# Patient Record
Sex: FEMALE | Race: WHITE | NOT HISPANIC OR LATINO | Employment: OTHER | URBAN - METROPOLITAN AREA
[De-identification: names, ages, dates, MRNs, and addresses within clinical notes are randomized per-mention and may not be internally consistent; named-entity substitution may affect disease eponyms.]

---

## 2018-03-21 ENCOUNTER — OFFICE VISIT (OUTPATIENT)
Dept: FAMILY MEDICINE | Facility: CLINIC | Age: 28
End: 2018-03-21
Payer: COMMERCIAL

## 2018-03-21 VITALS
SYSTOLIC BLOOD PRESSURE: 126 MMHG | HEIGHT: 66 IN | BODY MASS INDEX: 27 KG/M2 | WEIGHT: 168 LBS | TEMPERATURE: 98.1 F | OXYGEN SATURATION: 100 % | HEART RATE: 95 BPM | DIASTOLIC BLOOD PRESSURE: 86 MMHG

## 2018-03-21 DIAGNOSIS — R20.0 NUMBNESS AND TINGLING OF FOOT: ICD-10-CM

## 2018-03-21 DIAGNOSIS — D69.6 THROMBOCYTOPENIA (H): ICD-10-CM

## 2018-03-21 DIAGNOSIS — Z13.220 SCREENING FOR HYPERLIPIDEMIA: ICD-10-CM

## 2018-03-21 DIAGNOSIS — F17.200 TOBACCO USE DISORDER: ICD-10-CM

## 2018-03-21 DIAGNOSIS — H93.13 TINNITUS, BILATERAL: ICD-10-CM

## 2018-03-21 DIAGNOSIS — Z00.00 ROUTINE HISTORY AND PHYSICAL EXAMINATION OF ADULT: Primary | ICD-10-CM

## 2018-03-21 DIAGNOSIS — H93.8X3 PLUGGED FEELING IN EAR, BILATERAL: ICD-10-CM

## 2018-03-21 DIAGNOSIS — R20.2 NUMBNESS AND TINGLING OF FOOT: ICD-10-CM

## 2018-03-21 DIAGNOSIS — Z13.1 SCREENING FOR DIABETES MELLITUS: ICD-10-CM

## 2018-03-21 DIAGNOSIS — R74.8 ELEVATED LIVER ENZYMES: ICD-10-CM

## 2018-03-21 LAB
ERYTHROCYTE [DISTWIDTH] IN BLOOD BY AUTOMATED COUNT: 14.9 % (ref 10–15)
HCT VFR BLD AUTO: 33.5 % (ref 35–47)
HGB BLD-MCNC: 11.6 G/DL (ref 11.7–15.7)
MCH RBC QN AUTO: 38.4 PG (ref 26.5–33)
MCHC RBC AUTO-ENTMCNC: 34.6 G/DL (ref 31.5–36.5)
MCV RBC AUTO: 111 FL (ref 78–100)
PLATELET # BLD AUTO: 92 10E9/L (ref 150–450)
RBC # BLD AUTO: 3.02 10E12/L (ref 3.8–5.2)
VIT B12 SERPL-MCNC: 958 PG/ML (ref 193–986)
WBC # BLD AUTO: 5.5 10E9/L (ref 4–11)

## 2018-03-21 PROCEDURE — 99385 PREV VISIT NEW AGE 18-39: CPT | Performed by: NURSE PRACTITIONER

## 2018-03-21 PROCEDURE — 80061 LIPID PANEL: CPT | Performed by: NURSE PRACTITIONER

## 2018-03-21 PROCEDURE — 84443 ASSAY THYROID STIM HORMONE: CPT | Performed by: NURSE PRACTITIONER

## 2018-03-21 PROCEDURE — 36415 COLL VENOUS BLD VENIPUNCTURE: CPT | Performed by: NURSE PRACTITIONER

## 2018-03-21 PROCEDURE — 80053 COMPREHEN METABOLIC PANEL: CPT | Performed by: NURSE PRACTITIONER

## 2018-03-21 PROCEDURE — 99213 OFFICE O/P EST LOW 20 MIN: CPT | Mod: 25 | Performed by: NURSE PRACTITIONER

## 2018-03-21 PROCEDURE — 85027 COMPLETE CBC AUTOMATED: CPT | Performed by: NURSE PRACTITIONER

## 2018-03-21 PROCEDURE — 82607 VITAMIN B-12: CPT | Performed by: NURSE PRACTITIONER

## 2018-03-21 ASSESSMENT — PAIN SCALES - GENERAL: PAINLEVEL: NO PAIN (0)

## 2018-03-21 NOTE — MR AVS SNAPSHOT
After Visit Summary   3/21/2018    Karla Gordon    MRN: [id number]           Patient Information     Date Of Birth          1990        Visit Information        Provider Department      3/21/2018 8:40 AM Millie Moore NP [facility]        Today's Diagnoses     Routine history and physical examination of adult    -  1    Numbness and tingling of foot        Plugged feeling in ear, bilateral        Tinnitus, bilateral        Screening for diabetes mellitus        Screening for hyperlipidemia          Care Instructions    Try using Neti Pot every day    Flonase nasal spray daily    Do this daily    Preventive Health Recommendations  Female Ages 26 - 39  Yearly exam:   See your health care provider every year in order to    Review health changes.     Discuss preventive care.      Review your medicines if you your doctor has prescribed any.    Until age 30: Get a Pap test every three years (more often if you have had an abnormal result).    After age 30: Talk to your doctor about whether you should have a Pap test every 3 years or have a Pap test with HPV screening every 5 years.   You do not need a Pap test if your uterus was removed (hysterectomy) and you have not had cancer.  You should be tested each year for STDs (sexually transmitted diseases), if you're at risk.   Talk to your provider about how often to have your cholesterol checked.  If you are at risk for diabetes, you should have a diabetes test (fasting glucose).  Shots: Get a flu shot each year. Get a tetanus shot every 10 years.   Nutrition:     Eat at least 5 servings of fruits and vegetables each day.    Eat whole-grain bread, whole-wheat pasta and brown rice instead of white grains and rice.    Talk to your provider about Calcium and Vitamin D.     Lifestyle    Exercise at least 150 minutes a week (30 minutes a day, 5 days of the week). This will help you control your weight and prevent  disease.    Limit alcohol to one drink per day.    No smoking.     Wear sunscreen to prevent skin cancer.    See your dentist every six months for an exam and cleaning.  [facility]   Discharged by : Gale Tapia CMA  Paper scripts provided to patient : no     If you have any questions regarding your visit please contact your care team:     Team Gold                Clinic Hours Telephone Number     Dr. Bonnie Moore NP 7am-7pm  Monday - Thursday   7am-5pm  Fridays  [phone]   (Appointment scheduling available 24/7)     RN Line  [phone] option 2     Urgent Care - [place] - 11am-9pm Monday-Friday Saturday-Sunday- 9am-5pm     [place] -   5pm-9pm Monday-Friday Saturday-Sunday- 9am-5pm    [phone] - [place]    [phone] - [place]       For a Price Quote for your services, please call our Consumer Price Line at [phone].     What options do I have for visits at the clinic other than the traditional office visit?     To expand how we care for you, many of our providers are utilizing electronic visits (e-visits) and telephone visits, when medically appropriate, for interactions with their patients rather than a visit in the clinic. We also offer nurse visits for many medical concerns. Just like any other service, we will bill your insurance company for this type of visit based on time spent on the phone with your provider. Not all insurance companies cover these visits. Please check with your medical insurance if this type of visit is covered. You will be responsible for any charges that are not paid by your insurance.   E-visits via DeYapa: generally incur a $35.00 fee.     Telephone visits:   Time spent on the phone: *charged based on time that is spent on the phone in increments of 10 minutes. Estimated cost:   5-10 mins $30.00   11-20 mins. $59.00    21-30 mins. $85.00     Use Spreetales (secure email communication and access to your chart) to send your primary care provider a message or make an appointment. Ask someone on your Team how to sign up for Spreetales.     As always, Thank you for trusting us with your health care needs!      [facility]:    Scheduling Appointments  [facility]  Call: [phone]    [facility]  Call: [phone]    [facility]  Call: [phone]    For Gastroenterology referrals   [facility]   Clinics and Surgery Center, 4th Floor   [address] [place] [address]   Appointments: [phone]    WHERE TO GO FOR CARE?  Clinic    Make an appointment if you:       Are sick (cold, cough, flu, sore throat, earache or in pain).       Have a small injury (sprain, small cut, burn or broken bone).       Need a physical exam, Pap smear, vaccine or prescription refill.       Have questions about your health or medicines.    To reach us:      Call [phone] ([phone]). Open 24 hours every day. (For counseling services, call [phone].)    Log into Spreetales at [email or web address]. (Visit [email or web address] to create an account.) Hospital emergency room    An emergency is a serious or life- threatening problem that must be treated right away.    Call 082 or get to the hospital if you have:      Very bad or sudden:            - Chest pain or pressure         - Bleeding         - Head or belly pain         - Dizziness or trouble seeing, walking or                          Speaking      Problems breathing      Blood in your vomit or you are coughing up blood      A major injury (knocked out, loss of a finger or limb, rape, broken bone protruding from skin)    A mental health crisis. (Or call the Mental Health Crisis line at 1-175.204.8487 or Suicide Prevention Hotline at 1-109.123.2759.)    Open 24 hours every day. You  don't need an appointment.     Urgent care    Visit urgent care for sickness or small injuries when the clinic is closed. You don't need an appointment. To check hours or find an urgent care near you, visit [email or web address]. Online care    Get online [facility] from [facility] for more than 70 common problems, like colds, allergies and infections. Open 24 hours every day at:   [email or web address]   Need help deciding?    For advice about where to be seen, you may call your clinic and ask to speak with a nurse. We're here for you 24 hours every day.         If you are deaf or hard of hearing, please let us know. We provide many free services including sign language interpreters, oral interpreters, TTYs, telephone amplifiers, note takers and written materials.                   Follow-ups after your visit        Additional Services     OTOLARYNGOLOGY REFERRAL       Your provider has referred you to: [place]: [place] [facility] M [phone]   [email or web address]/[place]/    Please be aware that coverage of these services is subject to the terms and limitations of your health insurance plan.  Call member services at your health plan with any benefit or coverage questions.      Please bring the following with you to your appointment:    (1) Any X-Rays, CTs or MRIs which have been performed.  Contact the facility where they were done to arrange for  prior to your scheduled appointment.   (2) List of current medications  (3) This referral request   (4) Any documents/labs given to you for this referral                  Follow-up notes from your care team     Return in about 4 weeks (around 4/18/2018) for Pap and review results.      Who to contact     If you have questions or need follow up information about today's clinic visit or your schedule please contact [facility] directly at [phone].  Normal or non-critical lab and imaging results will be communicated to you by Gato  "letter or phone within 4 business days after the clinic has received the results. If you do not hear from us within 7 days, please contact the clinic through Squla or phone. If you have a critical or abnormal lab result, we will notify you by phone as soon as possible.  Submit refill requests through Squla or call your pharmacy and they will forward the refill request to us. Please allow 3 business days for your refill to be completed.          Additional Information About Your Visit        Squla Information     Squla lets you send messages to your doctor, view your test results, renew your prescriptions, schedule appointments and more. To sign up, go to [email or web address] . Click on \"Log in\" on the left side of the screen, which will take you to the Welcome page. Then click on \"Sign up Now\" on the right side of the page.     You will be asked to enter the access code listed below, as well as some personal information. Please follow the directions to create your username and password.     Your access code is: [email or web address]  Expires: 2018  9:38 AM     Your access code will  in 90 days. If you need help or a new code, please call your [place] clinic or [phone].        Care EveryWhere ID     This is your Care EveryWhere ID. This could be used by other organizations to access your [place] medical records  [id number]        Your Vitals Were     Pulse Temperature Height Last Period Pulse Oximetry BMI (Body Mass Index)    95 98.1  F (36.7  C) (Oral) 5' 6.2\" (1.681 m) 2018 100% 26.95 kg/m2       Blood Pressure from Last 3 Encounters:   18 126/86    Weight from Last 3 Encounters:   18 168 lb (76.2 kg)              We Performed the Following     CBC with platelets     Comprehensive metabolic panel (BMP + Alb, Alk Phos, ALT, AST, Total. Bili, TP)     Lipid Profile (Chol, Trig, HDL, LDL calc)     OTOLARYNGOLOGY REFERRAL     TSH with free T4 reflex     Vitamin B12        Primary " Care Provider Office Phone # Fax #    [facility] [phone]       [address] [facility] [address]        Equal Access to Services     GISELLE KRAUSE : Hadii aad ku hadjennio Soantoineali, waaxda luqadaha, qaybta kaalmada adekendrayada, antony hutchinsonkarthikeyan baldwinkendra kayfei lacy. So [facility] [phone].    ATENCIÓN: Si habla español, tiene a hobbs disposición servicios gratuitos de asistencia lingüística. Llame al [phone].    We comply with applicable federal civil rights laws and [place] laws. We do not discriminate on the basis of race, color, national origin, age, disability, sex, sexual orientation, or gender identity.            Thank you!     Thank you for choosing [facility]  for your care. Our goal is always to provide you with excellent care. Hearing back from our patients is one way we can continue to improve our services. Please take a few minutes to complete the written survey that you may receive in the mail after your visit with us. Thank you!             Your Updated Medication List - Protect others around you: Learn how to safely use, store and throw away your medicines at www.disposemymeds.org.          This list is accurate as of 3/21/18  9:38 AM.  Always use your most recent med list.                   Brand Name Dispense Instructions for use Diagnosis    diphenhydrAMINE-acetaminophen  MG tablet    TYLENOL PM     Take 2 tablets by mouth nightly as needed for sleep

## 2018-03-21 NOTE — PROGRESS NOTES
"   SUBJECTIVE:   CC: Karla Gordon is an 27 year old woman who presents for preventive health visit.     She is here mainly for her ear concern and feet concern.    Physical   Annual:     Getting at least 3 servings of Calcium per day::  Yes    Bi-annual eye exam::  Yes    Dental care twice a year::  NO    Sleep apnea or symptoms of sleep apnea::  Daytime drowsiness    Taking medications regularly::  Yes    Medication side effects::  Not applicable    Additional concerns today::  YES          Ear concern:  Asks for ENT referral.  Here mainly for her ears.  Relocated to [place] from [facility] in September 2017.  September or October her ears started getting plugged up.  Also gets vertigo then feels off balance, she even ran into a counter the other day and bruised her left hip.  Quick motions make vertigo worse.  No nasal congestion.  Near falls due to off balance with vertigo.  Got her hearing tested by audiology 3 weeks ago and was told it was normal.  Tried ear drops (\"ear allergy\"), home remedy treatment (hydrogen peroxide, garlic and olive oil) that all did not help.  Not tried nasal sprays.    Tinnitus:  Bilateral, daily.  Will sometimes go hours without it.    No headaches.  Was anemic during pregnancy.    Went to the eye doctor within the past week.  Left eye hemorrhage which she got referred to another eye clinic, right eye blurred vision.  Wears glasses.    Will be establishing care with a dentist soon.    Numb Feet:  Constant numbness in feet for the past 1 month.  Also will get some tingling.  This started suddenly 1 month ago.  Location:  Top and bottom of right foot up to the ankle.  Left foot toes and ball of foot.  Went to get massage 2 months ago who said her iliotibial bands were problematic but could not give her any explanation for the numbness in feet.  No back pain.    Sleeps on couch because her  snores.    Has a 2 year old boy.  Last Pap was in pregnancy 2016.  Patient " declines Pap today due to menstruation, she signed TRINY to get last Pap record.    PSH:  Right partial shoulder replacement age 25.  [place] teeth extraction 2015.    Tobacco use:  1/2 pack per day, for 7 years.    *Both ears plugged for 6 mo, and both feet numb for 1 mo.      Today's PHQ-2 Score:   PHQ-2 ( 1999 Pfizer) 3/21/2018   Q1: Little interest or pleasure in doing things 0   Q2: Feeling down, depressed or hopeless 0   PHQ-2 Score 0   Q1: Little interest or pleasure in doing things Not at all   Q2: Feeling down, depressed or hopeless Not at all   PHQ-2 Score 0       Abuse: Current or Past(Physical, Sexual or Emotional)- NO  Do you feel safe in your environment - YES    Social History   Substance Use Topics     Smoking status: Current Every Day Smoker     Packs/day: 0.50     Years: 7.00     Types: Cigarettes     Smokeless tobacco: Current User     Alcohol use Yes     Alcohol Use 3/21/2018   If you drink alcohol do you typically have greater than 3 drinks per day OR greater than 7 drinks per week? Yes   AUDIT SCORE  4     AUDIT - Alcohol Use Disorders Identification Test - Reproduced from the World Health Organization Audit 2001 (Second Edition) 3/21/2018   1.  How often do you have a drink containing alcohol? 4 or more times a week   2.  How many drinks containing alcohol do you have on a typical day when you are drinking? 1 or 2   3.  How often do you have five or more drinks on one occasion? Never   4.  How often during the last year have you found that you were not able to stop drinking once you had started? Never   5.  How often during the last year have you failed to do what was normally expected of you because of drinking? Never   6.  How often during the last year have you needed a first drink in the morning to get yourself going after a heavy drinking session? Never   7.  How often during the last year have you had a feeling of guilt or remorse after drinking? Never   8.  How often during the last year  have you been unable to remember what happened the night before because of your drinking? Never   9.  Have you or someone else been injured because of your drinking? No   10. Has a relative, friend, doctor or other health care worker been concerned about your drinking or suggested you cut down? No   TOTAL SCORE 4       Reviewed orders with patient.  Reviewed health maintenance and updated orders accordingly - Yes  Patient Active Problem List   Diagnosis     Numbness and tingling of foot     Tinnitus, bilateral     Plugged feeling in ear, bilateral     Tobacco use disorder     Past Surgical History:   Procedure Laterality Date     C ANESTH,SHOULDER REPLACEMENT Right 2015    right partial shoulder replacement at age 25     HC TOOTH EXTRACTION W/FORCEP  2015       Social History   Substance Use Topics     Smoking status: Current Every Day Smoker     Packs/day: 0.50     Years: 7.00     Types: Cigarettes     Smokeless tobacco: Current User     Alcohol use Yes     Family History   Problem Relation Age of Onset     CANCER Mother 62     bladder cancer     Hypertension Mother      Psoriasis Father      No Known Problems Brother      No Known Problems Sister      No Known Problems Son      DIABETES No family hx of      Hyperlipidemia No family hx of      Breast Cancer No family hx of      Colon Cancer No family hx of          Current Outpatient Prescriptions   Medication Sig Dispense Refill     diphenhydrAMINE-acetaminophen (TYLENOL PM)  MG tablet Take 2 tablets by mouth nightly as needed for sleep       Allergies   Allergen Reactions     Spinach      Mouth gets numb       Mammogram not appropriate for this patient based on age.    Pertinent mammograms are reviewed under the imaging tab.  History of abnormal Pap smear: NO - age 21-29 PAP every 3 years recommended    Reviewed and updated as needed this visit by clinical staff  Allergies  Meds  Problems         Reviewed and updated as needed this visit by  "Provider  Allergies  Meds  Problems            Review of Systems  C: NEGATIVE for fever, chills, change in weight  I: NEGATIVE for worrisome rashes, moles or lesions  EYES: POSITIVE for vision change right eye, just saw eye doctor  ENT: see HPI  R: NEGATIVE for significant cough or SOB  B: NEGATIVE for masses, tenderness or discharge  CV: NEGATIVE for chest pain, palpitations or peripheral edema  GI: NEGATIVE for nausea, abdominal pain, heartburn, or change in bowel habits  : NEGATIVE for unusual urinary or vaginal symptoms. Periods are regular.  M: NEGATIVE for significant arthralgias or myalgia  NEURO: POSITIVE for numbness or tingling feet and vertigo  P: NEGATIVE for changes in mood or affect     OBJECTIVE:   /86 (BP Location: Right arm, Patient Position: Chair, Cuff Size: Adult Regular)  Pulse 95  Temp 98.1  F (36.7  C) (Oral)  Ht 5' 6.2\" (1.681 m)  Wt 168 lb (76.2 kg)  LMP 03/17/2018  SpO2 100%  BMI 26.95 kg/m2  Physical Exam  GENERAL: healthy, alert and no distress  EYES: Eyes grossly normal to inspection, PERRL and conjunctivae and sclerae normal  HENT: ear canals and TM's normal, nose and mouth without ulcers or lesions  NECK: no adenopathy, no asymmetry, masses, or scars and thyroid normal to palpation  RESP: lungs clear to auscultation - no rales, rhonchi or wheezes  BREAST: normal without masses, tenderness or nipple discharge and no palpable axillary masses or adenopathy  CV: regular rate and rhythm, normal S1 S2, no S3 or S4, no murmur, click or rub, no peripheral edema and peripheral pulses strong  ABDOMEN: soft, nontender, no hepatosplenomegaly, no masses and bowel sounds normal  MS: no gross musculoskeletal defects noted, no edema  SKIN: no suspicious lesions or rashes  NEURO: Normal strength and tone, mentation intact and speech normal  PSYCH: mentation appears normal, affect normal/bright  Feet:  Monofilament abnormal with decreased sensation in feet    ASSESSMENT/PLAN:   1. " "Routine history and physical examination of adult  - Patient declined Pap and signed TRINY to get last pap record.    2. Numbness and tingling of feet x1 month  Will start with blood work-up  - CBC with platelets  - Comprehensive metabolic panel (BMP + Alb, Alk Phos, ALT, AST, Total. Bili, TP)  - TSH with free T4 reflex  - Vitamin B12    3. Plugged feeling in ear, bilateral    - OTOLARYNGOLOGY REFERRAL    4. Tinnitus, bilateral    - OTOLARYNGOLOGY REFERRAL    5. Screening for diabetes mellitus    6. Screening for hyperlipidemia    - Lipid Profile (Chol, Trig, HDL, LDL calc)    7. Tobacco use disorder      COUNSELING:  Reviewed preventive health counseling, as reflected in patient instructions       Regular exercise       Healthy diet/nutrition       Vision screening       Hearing screening         reports that she has been smoking Cigarettes.  She has a 3.50 pack-year smoking history. She uses smokeless tobacco.  Tobacco Cessation Action Plan: Information offered: Patient not interested at this time  Estimated body mass index is 26.95 kg/(m^2) as calculated from the following:    Height as of this encounter: 5' 6.2\" (1.681 m).    Weight as of this encounter: 168 lb (76.2 kg).   Weight management plan: Discussed healthy diet and exercise guidelines and patient will follow up in 12 months in clinic to re-evaluate.    Counseling Resources:  ATP IV Guidelines  Pooled Cohorts Equation Calculator  Breast Cancer Risk Calculator  FRAX Risk Assessment  ICSI Preventive Guidelines  Dietary Guidelines for Americans, 2010  USDA's MyPlate  ASA Prophylaxis  Lung CA Screening    Millie Moore, NP  [facility]  Answers for HPI/ROS submitted by the patient on 3/21/2018   PHQ-2 Score: 0  E  "

## 2018-03-21 NOTE — PATIENT INSTRUCTIONS
Try using Neti Pot every day    Flonase nasal spray daily    Do this daily    Preventive Health Recommendations  Female Ages 26 - 39  Yearly exam:   See your health care provider every year in order to    Review health changes.     Discuss preventive care.      Review your medicines if you your doctor has prescribed any.    Until age 30: Get a Pap test every three years (more often if you have had an abnormal result).    After age 30: Talk to your doctor about whether you should have a Pap test every 3 years or have a Pap test with HPV screening every 5 years.   You do not need a Pap test if your uterus was removed (hysterectomy) and you have not had cancer.  You should be tested each year for STDs (sexually transmitted diseases), if you're at risk.   Talk to your provider about how often to have your cholesterol checked.  If you are at risk for diabetes, you should have a diabetes test (fasting glucose).  Shots: Get a flu shot each year. Get a tetanus shot every 10 years.   Nutrition:     Eat at least 5 servings of fruits and vegetables each day.    Eat whole-grain bread, whole-wheat pasta and brown rice instead of white grains and rice.    Talk to your provider about Calcium and Vitamin D.     Lifestyle    Exercise at least 150 minutes a week (30 minutes a day, 5 days of the week). This will help you control your weight and prevent disease.    Limit alcohol to one drink per day.    No smoking.     Wear sunscreen to prevent skin cancer.    See your dentist every six months for an exam and cleaning.  [employer]   Discharged by : Gale Tapia CMA  Paper scripts provided to patient : no     If you have any questions regarding your visit please contact your care team:     Team Gold                Clinic Hours Telephone Number     Dr. Bonnie Moore, NP 7am-7pm  Monday - Thursday   7am-5pm  Fridays  [phone]    (Appointment scheduling available 24/7)     RN Line  [phone] option 2     Urgent Care - [place] and [place] - 11am-9pm Monday-Friday Saturday-Sunday- 9am-5pm     [place] -   5pm-9pm Monday-Friday Saturday-Sunday- 9am-5pm    [phone] - [place]    [phone] - [place]       For a Price Quote for your services, please call our Consumer Price Line at [phone].     What options do I have for visits at the clinic other than the traditional office visit?     To expand how we care for you, many of our providers are utilizing electronic visits (e-visits) and telephone visits, when medically appropriate, for interactions with their patients rather than a visit in the clinic. We also offer nurse visits for many medical concerns. Just like any other service, we will bill your insurance company for this type of visit based on time spent on the phone with your provider. Not all insurance companies cover these visits. Please check with your medical insurance if this type of visit is covered. You will be responsible for any charges that are not paid by your insurance.   E-visits via Sara Campbellhart: generally incur a $35.00 fee.     Telephone visits:   Time spent on the phone: *charged based on time that is spent on the phone in increments of 10 minutes. Estimated cost:   5-10 mins $30.00   11-20 mins. $59.00   21-30 mins. $85.00     Use Sara Campbellhart (secure email communication and access to your chart) to send your primary care provider a message or make an appointment. Ask someone on your Team how to sign up for [employer].     As always, Thank you for trusting us with your health care needs!      [facility]:    Scheduling Appointments  [place] [place]  Call: [phone]    [facility]  Call: [phone]    [facility]  Call: [phone]    For Gastroenterology referrals   [facility] Gastroenterology    [facility], 4th Floor   [address] [place] [address]   Appointments: [phone]    WHERE TO GO FOR CARE?  Clinic    Make an appointment if you:       Are sick (cold, cough, flu, sore throat, earache or in pain).       Have a small injury (sprain, small cut, burn or broken bone).       Need a physical exam, Pap smear, vaccine or prescription refill.       Have questions about your health or medicines.    To reach us:      Call [phone] ([phone]). Open 24 hours every day. (For counseling services, call [phone].)    Log into "[employer] at [employer]. (Visit [employer] to create an account.) Hospital emergency room    An emergency is a serious or life- threatening problem that must be treated right away.    Call [phone] or get to the hospital if you have:      Very bad or sudden:            - Chest pain or pressure         - Bleeding         - Head or belly pain         - Dizziness or trouble seeing, walking or                          Speaking      Problems breathing      Blood in your vomit or you are coughing up blood      A major injury (knocked out, loss of a finger or limb, rape, broken bone protruding from skin)    A mental health crisis. (Or call the Mental Health Crisis line at 1-979.623.8903 or Suicide Prevention Hotline at 1-585.893.8312.)    Open 24 hours every day. You don't need an appointment.     Urgent care    Visit urgent care for sickness or small injuries when the clinic is closed. You don't need an appointment. To check hours or find an urgent care near you, visit [email or web address]. Online care    Get online care from [facility] for more than 70 common problems, like colds, allergies and infections. Open 24 hours every day at:   [email or web address]   Need help deciding?    For advice about where to be seen, you may call your clinic and ask to speak with a nurse. We're here for you 24 hours every day.         If you are deaf or hard of hearing,  please let us know. We provide many free services including sign language interpreters, oral interpreters, TTYs, telephone amplifiers, note takers and written materials.

## 2018-03-21 NOTE — NURSING NOTE
"Chief Complaint   Patient presents with     Physical     Ear Problem     both are plugged - times 6mo     Musculoskeletal Problem     both feet are numb - times 1 mo        Initial /86 (BP Location: Right arm, Patient Position: Chair, Cuff Size: Adult Regular)  Pulse 95  Temp 98.1  F (36.7  C) (Oral)  Ht 5' 6.2\" (1.681 m)  Wt 168 lb (76.2 kg)  LMP 03/17/2018  SpO2 100%  BMI 26.95 kg/m2 Estimated body mass index is 26.95 kg/(m^2) as calculated from the following:    Height as of this encounter: 5' 6.2\" (1.681 m).    Weight as of this encounter: 168 lb (76.2 kg).  Medication Reconciliation: complete   Gale Tapia CMA      "

## 2018-03-22 PROBLEM — R74.8 ELEVATED LIVER ENZYMES: Status: ACTIVE | Noted: 2018-03-22

## 2018-03-22 LAB
ALBUMIN SERPL-MCNC: 3.6 G/DL (ref 3.4–5)
ALP SERPL-CCNC: 160 U/L (ref 40–150)
ALT SERPL W P-5'-P-CCNC: 76 U/L (ref 0–50)
ANION GAP SERPL CALCULATED.3IONS-SCNC: 9 MMOL/L (ref 3–14)
AST SERPL W P-5'-P-CCNC: 236 U/L (ref 0–45)
BILIRUB SERPL-MCNC: 1.1 MG/DL (ref 0.2–1.3)
BUN SERPL-MCNC: 7 MG/DL (ref 7–30)
CALCIUM SERPL-MCNC: 8.2 MG/DL (ref 8.5–10.1)
CHLORIDE SERPL-SCNC: 107 MMOL/L (ref 94–109)
CHOLEST SERPL-MCNC: 211 MG/DL
CO2 SERPL-SCNC: 26 MMOL/L (ref 20–32)
CREAT SERPL-MCNC: 0.53 MG/DL (ref 0.52–1.04)
GFR SERPL CREATININE-BSD FRML MDRD: >90 ML/MIN/1.7M2
GLUCOSE SERPL-MCNC: 92 MG/DL (ref 70–99)
HDLC SERPL-MCNC: 94 MG/DL
LDLC SERPL CALC-MCNC: 76 MG/DL
NONHDLC SERPL-MCNC: 117 MG/DL
POTASSIUM SERPL-SCNC: 4.6 MMOL/L (ref 3.4–5.3)
PROT SERPL-MCNC: 6.9 G/DL (ref 6.8–8.8)
SODIUM SERPL-SCNC: 142 MMOL/L (ref 133–144)
TRIGL SERPL-MCNC: 206 MG/DL
TSH SERPL DL<=0.005 MIU/L-ACNC: 1.51 MU/L (ref 0.4–4)

## 2018-03-23 ENCOUNTER — TELEPHONE (OUTPATIENT)
Dept: FAMILY MEDICINE | Facility: CLINIC | Age: 28
End: 2018-03-23

## 2018-03-23 NOTE — PROGRESS NOTES
Please call Karla to let her know about results and plan:    - Vitamin B12 normal  - Fasting blood sugar normal  - Kidney function normal  - Thyroid level normal  - Cholesterol levels show high triglycerides, no medication needed at this time.    - Liver tests are elevated and I recommend rechecking this in 1-2 weeks and screening for hepatitis B and C.    Also recommend she avoids alcohol and Tylenol use now until we figure out what is causing her liver tests to be elevated.  - Blood counts show that her platelet level is low.  I recommend rechecking this in 1-2 weeks.    She should schedule a lab appointment in 1-2 weeks.  Schedule a follow up with me for recheck and lab review after that.

## 2018-03-23 NOTE — TELEPHONE ENCOUNTER
Please call Karla to let her know about results and plan:     - Vitamin B12 normal   - Fasting blood sugar normal   - Kidney function normal   - Thyroid level normal   - Cholesterol levels show high triglycerides, no medication needed at this time.     - Liver tests are elevated and I recommend rechecking this in 1-2 weeks and screening for hepatitis B and C.     Also recommend she avoids alcohol and Tylenol use now until we figure out what is causing her liver tests to be elevated.   - Blood counts show that her platelet level is low.  I recommend rechecking this in 1-2 weeks.     She should schedule a lab appointment in 1-2 weeks.   Schedule a follow up with me for recheck and lab review after that.     Millie Moore NP

## 2018-03-23 NOTE — TELEPHONE ENCOUNTER
Relayed message, she will walk into the lab sometime next week & I scheduled her for 4/4.  Sara Coombs RN

## 2018-04-02 DIAGNOSIS — D53.9 MACROCYTIC ANEMIA: Primary | ICD-10-CM

## 2018-04-02 DIAGNOSIS — D69.6 THROMBOCYTOPENIA (H): ICD-10-CM

## 2018-04-02 DIAGNOSIS — R74.8 ELEVATED LIVER ENZYMES: ICD-10-CM

## 2018-04-02 LAB
BASOPHILS # BLD AUTO: 0 10E9/L (ref 0–0.2)
BASOPHILS NFR BLD AUTO: 0.4 %
DIFFERENTIAL METHOD BLD: ABNORMAL
EOSINOPHIL # BLD AUTO: 0 10E9/L (ref 0–0.7)
EOSINOPHIL NFR BLD AUTO: 0.8 %
ERYTHROCYTE [DISTWIDTH] IN BLOOD BY AUTOMATED COUNT: 16 % (ref 10–15)
HCT VFR BLD AUTO: 32.7 % (ref 35–47)
HGB BLD-MCNC: 11.3 G/DL (ref 11.7–15.7)
LYMPHOCYTES # BLD AUTO: 1.2 10E9/L (ref 0.8–5.3)
LYMPHOCYTES NFR BLD AUTO: 24 %
MCH RBC QN AUTO: 38.8 PG (ref 26.5–33)
MCHC RBC AUTO-ENTMCNC: 34.6 G/DL (ref 31.5–36.5)
MCV RBC AUTO: 112 FL (ref 78–100)
MONOCYTES # BLD AUTO: 0.5 10E9/L (ref 0–1.3)
MONOCYTES NFR BLD AUTO: 9.4 %
NEUTROPHILS # BLD AUTO: 3.2 10E9/L (ref 1.6–8.3)
NEUTROPHILS NFR BLD AUTO: 65.4 %
PLATELET # BLD AUTO: 92 10E9/L (ref 150–450)
RBC # BLD AUTO: 2.91 10E12/L (ref 3.8–5.2)
WBC # BLD AUTO: 4.9 10E9/L (ref 4–11)

## 2018-04-02 PROCEDURE — 85060 BLOOD SMEAR INTERPRETATION: CPT | Performed by: NURSE PRACTITIONER

## 2018-04-02 PROCEDURE — G0499 HEPB SCREEN HIGH RISK INDIV: HCPCS | Performed by: NURSE PRACTITIONER

## 2018-04-02 PROCEDURE — 86803 HEPATITIS C AB TEST: CPT | Performed by: NURSE PRACTITIONER

## 2018-04-02 PROCEDURE — 36415 COLL VENOUS BLD VENIPUNCTURE: CPT | Performed by: NURSE PRACTITIONER

## 2018-04-02 PROCEDURE — 85025 COMPLETE CBC W/AUTO DIFF WBC: CPT | Performed by: NURSE PRACTITIONER

## 2018-04-03 ENCOUNTER — DOCUMENTATION ONLY (OUTPATIENT)
Dept: LAB | Facility: CLINIC | Age: 28
End: 2018-04-03

## 2018-04-03 DIAGNOSIS — D53.9 MACROCYTIC ANEMIA: Primary | ICD-10-CM

## 2018-04-03 LAB
COPATH REPORT: NORMAL
HBV SURFACE AG SERPL QL IA: NONREACTIVE
HCV AB SERPL QL IA: NONREACTIVE

## 2018-04-03 NOTE — PROGRESS NOTES
Will discuss result with patient at her upcoming appointment on 4/4/18.  Folate added on to work-up macrocytic anemia.    Note to self:  Patient with elevated liver enzymes, thrombocytopenia, macrocytic anemia  Differentials for Macrocytic anemia:  Excessive alcohol, liver disease, folate deficiency.  (TSH and B12 is normal).

## 2018-04-03 NOTE — PROGRESS NOTES
We are unable to add the folate to the previous orders, because folate requires to be frozen, the stability is only 8 hours if its not frozen right a way.     Tessy GODINEZ

## 2018-04-04 ENCOUNTER — OFFICE VISIT (OUTPATIENT)
Dept: FAMILY MEDICINE | Facility: CLINIC | Age: 28
End: 2018-04-04
Payer: COMMERCIAL

## 2018-04-04 VITALS
HEART RATE: 68 BPM | HEIGHT: 66 IN | BODY MASS INDEX: 27 KG/M2 | DIASTOLIC BLOOD PRESSURE: 70 MMHG | TEMPERATURE: 98.2 F | SYSTOLIC BLOOD PRESSURE: 112 MMHG | WEIGHT: 168 LBS

## 2018-04-04 DIAGNOSIS — R20.2 NUMBNESS AND TINGLING OF FOOT: ICD-10-CM

## 2018-04-04 DIAGNOSIS — F10.90 HEAVY ALCOHOL USE: Primary | ICD-10-CM

## 2018-04-04 DIAGNOSIS — R74.8 ELEVATED LIVER ENZYMES: ICD-10-CM

## 2018-04-04 DIAGNOSIS — D69.6 THROMBOCYTOPENIA (H): ICD-10-CM

## 2018-04-04 DIAGNOSIS — R20.0 NUMBNESS AND TINGLING OF FOOT: ICD-10-CM

## 2018-04-04 PROCEDURE — 99214 OFFICE O/P EST MOD 30 MIN: CPT | Performed by: NURSE PRACTITIONER

## 2018-04-04 NOTE — LETTER
[facility]  [address] [address]  [phone]      July 2, 2018      Karla Gordon  [address]  [place] [address]          Dear Karla Gordon:    This is to remind you that your provider wanted you to return to the clinic for lab testing.    If you are coming in for Lipids and/or Glucose testing please fast for 10-12 hours. Morning medications can be taken with water.    You may call our office at [phone] to schedule an appointment.    Please disregard this notice if you have already had your labs drawn or made an            appointment.        Sincerely,    [facility]

## 2018-04-04 NOTE — PATIENT INSTRUCTIONS
- Stop alcohol for now.  - Recheck blood in 6 weeks    - if the numbness in the feet does not get better then our plan will be neurologist consult  - if liver enzymes remain high then we will do the Liver Ultrasound and possible see a liver doctor  - if the platelets are still low we will send you to a blood doctor    Liver enzymes are high, platelets are low.  Both of these could be from drinking      Go to National [place] of Health website to review alcohol amount <7 drinks per week for women (1 or less per day)    [facility]   Discharged by : toan lobo NP  Paper scripts provided to patient : none     If you have any questions regarding your visit please contact your care team:     Team Gold                Clinic Hours Telephone Number     Dr. Bonnie Lobo NP 7am-7pm  Monday - Thursday   7am-5pm  Fridays  [phone]   (Appointment scheduling available 24/7)     RN Line  [phone] option 2     Urgent Care - [place] and [place] - 11am-9pm Monday-Friday Saturday-Sunday- 9am-5pm     [place] -   5pm-9pm Monday-Friday Saturday-Sunday- 9am-5pm    [phone] - [place]    [phone] - [place]       For a Price Quote for your services, please call our Consumer Price Line at [phone].     What options do I have for visits at the clinic other than the traditional office visit?     To expand how we care for you, many of our providers are utilizing electronic visits (e-visits) and telephone visits, when medically appropriate, for interactions with their patients rather than a visit in the clinic. We also offer nurse visits for many medical concerns. Just like any other service, we will bill your insurance company for this type of visit based on time spent on the phone with your provider. Not all insurance companies cover these visits. Please check with your medical  insurance if this type of visit is covered. You will be responsible for any charges that are not paid by your insurance.   E-visits via [employer]: generally incur a $35.00 fee.     Telephone visits:   Time spent on the phone: *charged based on time that is spent on the phone in increments of 10 minutes. Estimated cost:   5-10 mins $30.00   11-20 mins. $59.00   21-30 mins. $85.00     Use [email or web address] (secure email communication and access to your chart) to send your primary care provider a message or make an appointment. Ask someone on your Team how to sign up for [email or web address].     As always, Thank you for trusting us with your health care needs!      [facility]:    Scheduling Appointments  [place] [place]  Call: [phone]    [facility]  Call: [phone]    [facility]  Call: [phone]    For Gastroenterology referrals   [facility]   Clinics and Surgery Center, 4th Floor   [address] [place] [address]   Appointments: [phone]    WHERE TO GO FOR CARE?  Clinic    Make an appointment if you:       Are sick (cold, cough, flu, sore throat, earache or in pain).       Have a small injury (sprain, small cut, burn or broken bone).       Need a physical exam, Pap smear, vaccine or prescription refill.       Have questions about your health or medicines.    To reach us:      Call [phone] ([phone]). Open 24 hours every day. (For counseling services, call [phone].)    Log into [email or web address] at [email or web address]. (Visit [email or web address] to create an account.) Hospital emergency room    An emergency is a serious or life- threatening problem that must be treated right away.    Call 727 or get to the hospital if you have:      Very bad or sudden:            - Chest pain or pressure         - Bleeding         - Head or belly pain         - Dizziness or trouble seeing, walking or                           Speaking      Problems breathing      Blood in your vomit or you are coughing up blood      A major injury (knocked out, loss of a finger or limb, rape, broken bone protruding from skin)    A mental health crisis. (Or call the Mental Health Crisis line at 1-418.979.3864 or Suicide Prevention Hotline at 1-750.647.3375.)    Open 24 hours every day. You don't need an appointment.     Urgent care    Visit urgent care for sickness or small injuries when the clinic is closed. You don't need an appointment. To check hours or find an urgent care near you, visit [email or web address]. Online care    Get online care from [facility] for more than 70 common problems, like colds, allergies and infections. Open 24 hours every day at:   [email or web address]   Need help deciding?    For advice about where to be seen, you may call your clinic and ask to speak with a nurse. We're here for you 24 hours every day.         If you are deaf or hard of hearing, please let us know. We provide many free services including sign language interpreters, oral interpreters, TTYs, telephone amplifiers, note takers and written materials.

## 2018-04-04 NOTE — PROGRESS NOTES
SUBJECTIVE:   Karla Gordon is a 27 year old female who presents to clinic today for the following health issues:      * follow-up on lab results     Was anemic in high school and in pregnancy.  Says she bruises easily and 3 years go when she had shoulder surgery she was told she bled a lot.    Drinks daily, 2-3 drinks per night of vodka    At her Physical a couple weeks lab showed a mild macrocytic anemia with hemoglobin 11.3.  Vitamin B12 was normal.  AST and ALT were high with AST>ALT.  Platelets were low.    She continues to have numbness in the feet.  No other concerns.    Problem list and histories reviewed & adjusted, as indicated.  Additional history: as documented    Patient Active Problem List   Diagnosis     Numbness and tingling of foot     Tinnitus, bilateral     Plugged feeling in ear, bilateral     Tobacco use disorder     Thrombocytopenia (H)     Elevated liver enzymes     Macrocytic anemia     Past Surgical History:   Procedure Laterality Date     C ANESTH,SHOULDER REPLACEMENT Right 2015    right partial shoulder replacement at age 25     HC TOOTH EXTRACTION W/FORCEP  2015       Social History   Substance Use Topics     Smoking status: Current Every Day Smoker     Packs/day: 0.50     Years: 7.00     Types: Cigarettes     Smokeless tobacco: Current User     Alcohol use Yes     Family History   Problem Relation Age of Onset     CANCER Mother 62     bladder cancer     Hypertension Mother      Psoriasis Father      No Known Problems Brother      No Known Problems Sister      No Known Problems Son      DIABETES No family hx of      Hyperlipidemia No family hx of      Breast Cancer No family hx of      Colon Cancer No family hx of          Current Outpatient Prescriptions   Medication Sig Dispense Refill     diphenhydrAMINE-acetaminophen (TYLENOL PM)  MG tablet Take 2 tablets by mouth nightly as needed for sleep       Allergies   Allergen Reactions     Spinach      Mouth gets numb  "      Reviewed and updated as needed this visit by clinical staff  Tobacco  Allergies  Meds  Med Hx  Surg Hx  Fam Hx  Soc Hx      Reviewed and updated as needed this visit by Provider         ROS:  Constitutional, HEENT, cardiovascular, pulmonary, gi and gu systems are negative, except as otherwise noted.    OBJECTIVE:     /70  Pulse 68  Temp 98.2  F (36.8  C) (Oral)  Ht 5' 6.2\" (1.681 m)  Wt 168 lb (76.2 kg)  LMP 03/17/2018  Breastfeeding? No  BMI 26.95 kg/m2  Body mass index is 26.95 kg/(m^2).  GENERAL: healthy, alert and no distress  RESP: lungs clear to auscultation - no rales, rhonchi or wheezes  CV: regular rate and rhythm, normal S1 S2, no S3 or S4, no murmur, click or rub, no peripheral edema and peripheral pulses strong    Diagnostic Test Results:  Results for orders placed or performed in visit on 04/02/18   CBC with platelets and differential   Result Value Ref Range    WBC 4.9 4.0 - 11.0 10e9/L    RBC Count 2.91 (L) 3.8 - 5.2 10e12/L    Hemoglobin 11.3 (L) 11.7 - 15.7 g/dL    Hematocrit 32.7 (L) 35.0 - 47.0 %     (H) 78 - 100 fl    MCH 38.8 (H) 26.5 - 33.0 pg    MCHC 34.6 31.5 - 36.5 g/dL    RDW 16.0 (H) 10.0 - 15.0 %    Platelet Count 92 (L) 150 - 450 10e9/L    Diff Method Automated Method     % Neutrophils 65.4 %    % Lymphocytes 24.0 %    % Monocytes 9.4 %    % Eosinophils 0.8 %    % Basophils 0.4 %    Absolute Neutrophil 3.2 1.6 - 8.3 10e9/L    Absolute Lymphocytes 1.2 0.8 - 5.3 10e9/L    Absolute Monocytes 0.5 0.0 - 1.3 10e9/L    Absolute Eosinophils 0.0 0.0 - 0.7 10e9/L    Absolute Basophils 0.0 0.0 - 0.2 10e9/L   Blood Morphology Pathologist Review   Result Value Ref Range    Copath Report       Patient Name: GERA STRINGER  MR#: [id number]  Specimen #: [id number]  Collected: 4/2/2018  Received: 4/3/2018  Reported: 4/3/2018 20:01  Ordering Phy(s): MORRIS MASTERSON    For improved result formatting, select 'View Enhanced Report Format' under   Linked Documents " section.    TEST(S):  Peripheral Smear Morphology    FINAL DIAGNOSIS:  Peripheral Smear Morphology:  - Mild macrocytic number normochromic anemia without morphologic evidence   of hemolysis or increased red cell  regeneration.  - Moderate thrombocytopenia    COMMENT:  The etiology for the patient's macrocytic anemia thrombocytosis requires   further clinical correlation.  Recent  lab studies showed no evidence of renal insufficiency and a normal item   into B12 level and TSH level.  The  patient's electronic medical record shows no evidence of recent evaluation   of folate.  Possible etiologies to  exclude including folate deficiency, medications, and toxins.  If a clear   etiology for the anemia associated  with the se possibilities not identified, further evaluation to rule out a   primary disorder myelopoiesis is  recommended.    Recent lab studies from 3/21/18:  Vitamin B12              958     Normal    (Ref normal: 193  - 986 pg/mL)  Urea Nitrogen            7        Normal   (Ref normal: 7-30 mg/dL)  Creatinine                0.53    Normal   (Ref normal: 0.52 - 1.04 mg/dL)  GFR Estimate            >90     Normal   (Ref normal: >60 ml/min/1.72m2)  TSH                      1.51    Normal    (Ref normal: 0.4 - 4.0 mU/L)    Electronically signed out by:  CONSTANTINO Orellana M.D.    CLINICAL HISTORY:  27-year-old female.    PERIPHERAL BLOOD DATA:  PERIPHERAL BLOOD DATA (Date: 4/02/18)  Patient Value (Reference Range >18 year old female)  4.87    WBC    (4.0-11.0 x 10*9/L)  2.91    RBC     (3.8-5.2 x 10*12/L)  11.3    HGB     (11.7-15.7 g/dL)  32.7    HCT     (35.0-47.0 %)  112.4   MCV     (78-100fL)  38.8    MCH     (26.5-33.0 pg)  34.6    MCHC   (31.5-36.5 g/dL)  16.0    RDW    (10.0-15.0 %)  92       PLT      (150-450 x 10*9/L)    PERIPHERAL BLOOD DIFFERENTIAL - Manual 200 cells.  (Reference ranges >18 year old)    Percent  62.5%  Neutrophils  24.5%  Lymphocytes  9.5%   Monocytes  1.5%   Eosinophils  2.0%    Basophils    Absolute  3.05   Neutrophils  (Ref normal 1.6 - 8.3 x 10*9/L)  1.19   Lymphocytes  (Ref normal 0.8 - 5.3 x 10*9/L)  0.46   Monocytes  (Ref normal 0 -1.3 x 10*9/L)  0.07   Eosinophils  (Ref normal 0 - 0.7 x 10*9/L)  0.10   Basophils  (Ref normal 0 - 0.2 x 10*9/L)    PERIPHERAL BLOOD MORPHOLOGY:    ERYTHROCYTES:  The red cells are macrocytic, normochromic and moderately   decreased in number for the patient's  age and gender. Mild anisopoikilocytosis is present with ovalocytes and   stomatocytes. No morphologic features  of hemolysis or increased polychromasia are identified.  No parasites are   identified.    LEUKOCYTES:  The leukocytes are normal in number, morphology and   differential distribution. No immature  precursors or evidence of neutrophilic dysplasia i s seen. No atypical   lymphoid cells are seen. No parasites or  parasitic inclusions are seen.    PLATELETS:  The platelets are morphologically normal and moderately   decreased in number. No appreciable  platelet aggregates, platelet clumping or platelet-leukocyte satellitism   is seen.  (Dictated by: ALEXIS Orellana MD 04/3/2018)    CPT Codes:  A: 63274-NVOH    TESTING LAB LOCATION:  [address] [address] [phone]    COLLECTION SITE:  Client:  [facility]  Location:  [facility] (B)     Hepatitis C antibody   Result Value Ref Range    Hepatitis C Antibody Nonreactive NR^Nonreactive   Hepatitis B surface antigen   Result Value Ref Range    Hep B Surface Agn Nonreactive NR^Nonreactive       ASSESSMENT/PLAN:     1. Heavy alcohol use  - Advised to stop all alcohol use, as this could be contributing to her elevated liver enzymes, thrombocytopenia, and numbness in feet.    2. Elevated liver enzymes  - Hepatitis B and C were negative.  Suspect secondary to heavy alcohol use.  - Recheck in 6 weeks after stopping alcohol, and if  still elevated will get liver ultrasound.    3. Thrombocytopenia (H)  - Recheck in 6 weeks and if persists then will refer to Hematology.    4. Numbness and tingling of foot    Patient Instructions:  - Stop alcohol for now.  - Recheck blood in 6 weeks    - if the numbness in the feet does not get better then our plan will be neurologist consult  - if liver enzymes remain high then we will do the Liver Ultrasound and possible see a liver doctor  - if the platelets are still low we will send you to a blood doctor    Go to National [place] of Health website to review alcohol amount <7 drinks per week for women (1 or less per day)    FUTURE LABS: CBC, folate, hepatic panel       - Return in 6 weeks for recheck and Patient/guardian verbalized understanding and agreement with the plan.    Millie Moore NP  [facility]

## 2018-04-04 NOTE — MR AVS SNAPSHOT
After Visit Summary   4/4/2018    Karla Gordon    MRN: [id number]           Patient Information     Date Of Birth          1990        Visit Information        Provider Department      4/4/2018 9:40 AM Millie Lobo NP [facility] Instructions    - Stop alcohol for now.  - Recheck blood in 6 weeks    - if the numbness in the feet does not get better then our plan will be neurologist consult  - if liver enzymes remain high then we will do the Liver Ultrasound and possible see a liver doctor  - if the platelets are still low we will send you to a blood doctor    Liver enzymes are high, platelets are low.  Both of these could be from drinking      Go to [employer] [place] [employer] website to review alcohol amount <7 drinks per week for women (1 or less per day)    [facility]   Discharged by : millie lobo NP  Paper scripts provided to patient : none     If you have any questions regarding your visit please contact your care team:     Team Gold                Clinic Hours Telephone Number     Dr. Bonnie Lobo NP 7am-7pm  Monday - Thursday   7am-5pm  Fridays  [phone]   (Appointment scheduling available 24/7)     RN Line  [phone] option 2     Urgent Care - [place] [place] - 11am-9pm Monday-Friday Saturday-Sunday- 9am-5pm     [place] -   5pm-9pm Monday-Friday Saturday-Sunday- 9am-5pm    [phone] - [place]akes    [phone] - [place]       For a Price Quote for your services, please call our Consumer Price Line at [phone].     What options do I have for visits at the clinic other than the traditional office visit?     To expand how we care for you, many of our providers are utilizing electronic visits (e-visits) and telephone visits, when medically appropriate, for interactions with their patients rather  than a visit in the clinic. We also offer nurse visits for many medical concerns. Just like any other service, we will bill your insurance company for this type of visit based on time spent on the phone with your provider. Not all insurance companies cover these visits. Please check with your medical insurance if this type of visit is covered. You will be responsible for any charges that are not paid by your insurance.   E-visits via Andtixhart: generally incur a $35.00 fee.     Telephone visits:   Time spent on the phone: *charged based on time that is spent on the phone in increments of 10 minutes. Estimated cost:   5-10 mins $30.00   11-20 mins. $59.00   21-30 mins. $85.00     Use Setred (secure email communication and access to your chart) to send your primary care provider a message or make an appointment. Ask someone on your Team how to sign up for Setred.     As always, Thank you for trusting us with your health care needs!      [facility]:    Scheduling Appointments  [place]  Call: [phone]    [facility]  Call: [phone]    [facility]  Call: [phone]    For Gastroenterology referrals   [facility]enterology   Clinics and Surgery Center, 4th Floor   [address] [place] [address]   Appointments: [phone]    WHERE TO GO FOR CARE?  Clinic    Make an appointment if you:       Are sick (cold, cough, flu, sore throat, earache or in pain).       Have a small injury (sprain, small cut, burn or broken bone).       Need a physical exam, Pap smear, vaccine or prescription refill.       Have questions about your health or medicines.    To reach us:      Call [phone] ([phone]). Open 24 hours every day. (For counseling services, call [phone].)    Log into Setred at [email or web address]. (Visit [email or web address] to create an account.) Hospital emergency room    An emergency  is a serious or life- threatening problem that must be treated right away.    Call 911 or get to the hospital if you have:      Very bad or sudden:            - Chest pain or pressure         - Bleeding         - Head or belly pain         - Dizziness or trouble seeing, walking or                          Speaking      Problems breathing      Blood in your vomit or you are coughing up blood      A major injury (knocked out, loss of a finger or limb, rape, broken bone protruding from skin)    A mental health crisis. (Or call the Mental Health Crisis line at 1-520.476.4464 or Suicide Prevention Hotline at 1-681.591.1802.)    Open 24 hours every day. You don't need an appointment.     Urgent care    Visit urgent care for sickness or small injuries when the clinic is closed. You don't need an appointment. To check hours or find an urgent care near you, visit [email or web address]. Online care    Get online care from [facility] for more than 70 common problems, like colds, allergies and infections. Open 24 hours every day at:   [email or web address]   Need help deciding?    For advice about where to be seen, you may call your clinic and ask to speak with a nurse. We're here for you 24 hours every day.         If you are deaf or hard of hearing, please let us know. We provide many free services including sign language interpreters, oral interpreters, TTYs, telephone amplifiers, note takers and written materials.                 Follow-ups after your visit        Future tests that were ordered for you today     Open Future Orders        Priority Expected Expires Ordered    Folate Routine  4/3/2019 4/3/2018            Who to contact     If you have questions or need follow up information about today's clinic visit or your schedule please contact [facility] directly at [phone].  Normal or non-critical lab and imaging results will be communicated to you by MyChart, letter or phone within 4 business days after the  "clinic has received the results. If you do not hear from us within 7 days, please contact the clinic through MVERSE or phone. If you have a critical or abnormal lab result, we will notify you by phone as soon as possible.  Submit refill requests through MVERSE or call your pharmacy and they will forward the refill request to us. Please allow 3 business days for your refill to be completed.          Additional Information About Your Visit        [employer] Information     MVERSE lets you send messages to your doctor, view your test results, renew your prescriptions, schedule appointments and more. To sign up, go to [email or web address] . Click on \"Log in\" on the left side of the screen, which will take you to the Welcome page. Then click on \"Sign up Now\" on the right side of the page.     You will be asked to enter the access code listed below, as well as some personal information. Please follow the directions to create your username and password.     Your access code is: [email or web address]  Expires: 2018  9:38 AM     Your access code will  in 90 days. If you need help or a new code, please call your [place] clinic or [phone].        Care EveryWhere ID     This is your Care EveryWhere ID. This could be used by other organizations to access your [place] medical records  [id number]        Your Vitals Were     Pulse Temperature Height Last Period Breastfeeding? BMI (Body Mass Index)    68 98.2  F (36.8  C) (Oral) 5' 6.2\" (1.681 m) 2018 No 26.95 kg/m2       Blood Pressure from Last 3 Encounters:   18 112/70   18 126/86    Weight from Last 3 Encounters:   18 168 lb (76.2 kg)   18 168 lb (76.2 kg)              Today, you had the following     No orders found for display       Primary Care Provider Office Phone # Fax #    [facility] [phone]       [address] [address]        Equal Access to Services     GISELLE KRAUSE AH: " Hadii marco leon Soantoineali, waaxda luqadaha, qaybta kaalmada uriel, antony saleemin hayaakarthikeyan baldwinkendra rudolph lausmankarthikeyan bambi. So [facility] [phone].    ATENCIÓN: Si habla gonzalesañol, tiene a hobbs disposición servicios gratuitos de asistencia lingüística. Llame al [phone].    We comply with applicable federal civil rights laws and [place] laws. We do not discriminate on the basis of race, color, national origin, age, disability, sex, sexual orientation, or gender identity.            Thank you!     Thank you for choosing [facility]  for your care. Our goal is always to provide you with excellent care. Hearing back from our patients is one way we can continue to improve our services. Please take a few minutes to complete the written survey that you may receive in the mail after your visit with us. Thank you!             Your Updated Medication List - Protect others around you: Learn how to safely use, store and throw away your medicines at www.disposemymeds.org.          This list is accurate as of 4/4/18 10:02 AM.  Always use your most recent med list.                   Brand Name Dispense Instructions for use Diagnosis    diphenhydrAMINE-acetaminophen  MG tablet    TYLENOL PM     Take 2 tablets by mouth nightly as needed for sleep

## 2018-04-04 NOTE — Clinical Note
Please abstract the following data from this visit with this patient into the appropriate field in Epic:  Pap smear done on this date: 10/2015 (approximately), by this group: [facility] in [place], [place], results were NILM.

## 2018-10-10 ENCOUNTER — TELEPHONE (OUTPATIENT)
Dept: FAMILY MEDICINE | Facility: CLINIC | Age: 28
End: 2018-10-10

## 2018-10-10 NOTE — TELEPHONE ENCOUNTER
Please call patient (or sent letter if she is not reachable by phone) to let her know that she is due for lab visit.    Millie Moore, DNP, APRN, CNP

## 2018-10-10 NOTE — TELEPHONE ENCOUNTER
I have attempted to contact this patient by phone with the following results: message left to return my call, number to call back left in message.     Gale Tapia CMA

## 2018-10-10 NOTE — LETTER
October 18, 2018      Karla Gordon  [address]  [place] [address]        Dear Karla,       Your provider has ordered lab tests for you.  She would like to check your blood cell count and liver.     Please call the clinic, at [phone], to make your appointment,         Sincerely,      Millie CABRERA-NP   [facility]/breanne

## 2018-11-16 ENCOUNTER — TELEPHONE (OUTPATIENT)
Dept: FAMILY MEDICINE | Facility: CLINIC | Age: 28
End: 2018-11-16

## 2018-11-16 ENCOUNTER — HOSPITAL ENCOUNTER (INPATIENT)
Facility: CLINIC | Age: 28
LOS: 2 days | Discharge: HOME | End: 2018-11-18
Attending: INTERNAL MEDICINE | Admitting: INTERNAL MEDICINE
Payer: COMMERCIAL

## 2018-11-16 DIAGNOSIS — D53.9 MACROCYTIC ANEMIA: ICD-10-CM

## 2018-11-16 DIAGNOSIS — D69.6 THROMBOCYTOPENIA (H): Primary | ICD-10-CM

## 2018-11-16 DIAGNOSIS — R74.8 ELEVATED LIVER ENZYMES: ICD-10-CM

## 2018-11-16 DIAGNOSIS — D64.9 ANEMIA, UNSPECIFIED TYPE: ICD-10-CM

## 2018-11-16 DIAGNOSIS — D69.6 THROMBOCYTOPENIA (H): ICD-10-CM

## 2018-11-16 LAB
ABO + RH BLD: NORMAL
ABO + RH BLD: NORMAL
ALBUMIN SERPL-MCNC: 2.8 G/DL (ref 3.4–5)
ALBUMIN SERPL-MCNC: 2.9 G/DL (ref 3.4–5)
ALBUMIN UR-MCNC: 10 MG/DL
ALP SERPL-CCNC: 164 U/L (ref 40–150)
ALP SERPL-CCNC: 185 U/L (ref 40–150)
ALT SERPL W P-5'-P-CCNC: 37 U/L (ref 0–50)
ALT SERPL W P-5'-P-CCNC: 39 U/L (ref 0–50)
ANION GAP SERPL CALCULATED.3IONS-SCNC: 10 MMOL/L (ref 3–14)
ANION GAP SERPL CALCULATED.3IONS-SCNC: 6 MMOL/L (ref 3–14)
ANISOCYTOSIS BLD QL SMEAR: ABNORMAL
APPEARANCE UR: ABNORMAL
APTT PPP: 30 SEC (ref 22–37)
AST SERPL W P-5'-P-CCNC: 118 U/L (ref 0–45)
AST SERPL W P-5'-P-CCNC: 126 U/L (ref 0–45)
BASOPHILS # BLD AUTO: 0 10E9/L (ref 0–0.2)
BASOPHILS NFR BLD AUTO: 0 %
BILIRUB DIRECT SERPL-MCNC: 0.9 MG/DL (ref 0–0.2)
BILIRUB SERPL-MCNC: 1.8 MG/DL (ref 0.2–1.3)
BILIRUB SERPL-MCNC: 2 MG/DL (ref 0.2–1.3)
BILIRUB UR QL STRIP: ABNORMAL
BLD GP AB SCN SERPL QL: NORMAL
BLD PROD TYP BPU: NORMAL
BLD UNIT ID BPU: 0
BLOOD BANK CMNT PATIENT-IMP: NORMAL
BLOOD PRODUCT CODE: NORMAL
BPU ID: NORMAL
BUN SERPL-MCNC: 4 MG/DL (ref 7–30)
BUN SERPL-MCNC: 4 MG/DL (ref 7–30)
CALCIUM SERPL-MCNC: 6 MG/DL (ref 8.5–10.1)
CALCIUM SERPL-MCNC: 6.4 MG/DL (ref 8.5–10.1)
CHLORIDE SERPL-SCNC: 102 MMOL/L (ref 94–109)
CHLORIDE SERPL-SCNC: 97 MMOL/L (ref 94–109)
CO2 SERPL-SCNC: 27 MMOL/L (ref 20–32)
CO2 SERPL-SCNC: 30 MMOL/L (ref 20–32)
COLOR UR AUTO: YELLOW
CREAT SERPL-MCNC: 0.44 MG/DL (ref 0.52–1.04)
CREAT SERPL-MCNC: 0.45 MG/DL (ref 0.52–1.04)
CRP SERPL-MCNC: 11 MG/L (ref 0–8)
DAT POLY-SP REAG RBC QL: NORMAL
DIFFERENTIAL METHOD BLD: ABNORMAL
EOSINOPHIL # BLD AUTO: 0 10E9/L (ref 0–0.7)
EOSINOPHIL NFR BLD AUTO: 0 %
ERYTHROCYTE [DISTWIDTH] IN BLOOD BY AUTOMATED COUNT: 21.7 % (ref 10–15)
ERYTHROCYTE [DISTWIDTH] IN BLOOD BY AUTOMATED COUNT: 23 % (ref 10–15)
ERYTHROCYTE [DISTWIDTH] IN BLOOD BY AUTOMATED COUNT: 23.7 % (ref 10–15)
ERYTHROCYTE [SEDIMENTATION RATE] IN BLOOD BY WESTERGREN METHOD: 102 MM/H (ref 0–20)
FIBRINOGEN PPP-MCNC: 173 MG/DL (ref 200–420)
FIBRINOGEN PPP-MCNC: 191 MG/DL (ref 200–420)
FOLATE SERPL-MCNC: 3.9 NG/ML
GFR SERPL CREATININE-BSD FRML MDRD: >90 ML/MIN/1.7M2
GFR SERPL CREATININE-BSD FRML MDRD: >90 ML/MIN/1.7M2
GLUCOSE SERPL-MCNC: 98 MG/DL (ref 70–99)
GLUCOSE SERPL-MCNC: 98 MG/DL (ref 70–99)
GLUCOSE UR STRIP-MCNC: NEGATIVE MG/DL
HCG UR QL: NEGATIVE
HCT VFR BLD AUTO: 13.8 % (ref 35–47)
HCT VFR BLD AUTO: 14.3 % (ref 35–47)
HCT VFR BLD AUTO: 21.3 % (ref 35–47)
HGB BLD-MCNC: 4.6 G/DL (ref 11.7–15.7)
HGB BLD-MCNC: 4.8 G/DL (ref 11.7–15.7)
HGB BLD-MCNC: 6.9 G/DL (ref 11.7–15.7)
HGB UR QL STRIP: ABNORMAL
INR PPP: 1.3 (ref 0.86–1.14)
INTERNAL QC OK POCT: YES
INTERPRETATION ECG - MUSE: NORMAL
IRON SATN MFR SERPL: 18 % (ref 15–46)
IRON SERPL-MCNC: 40 UG/DL (ref 35–180)
KETONES UR STRIP-MCNC: NEGATIVE MG/DL
LACTATE BLD-SCNC: 1.2 MMOL/L (ref 0.7–2)
LACTATE BLD-SCNC: 3 MMOL/L (ref 0.7–2)
LDH SERPL L TO P-CCNC: 1133 U/L (ref 81–234)
LEUKOCYTE ESTERASE UR QL STRIP: ABNORMAL
LYMPHOCYTES # BLD AUTO: 2.5 10E9/L (ref 0.8–5.3)
LYMPHOCYTES NFR BLD AUTO: 13.9 %
MACROCYTES BLD QL SMEAR: PRESENT
MAGNESIUM SERPL-MCNC: 1 MG/DL (ref 1.6–2.3)
MCH RBC QN AUTO: 35.2 PG (ref 26.5–33)
MCH RBC QN AUTO: 42.2 PG (ref 26.5–33)
MCH RBC QN AUTO: 42.5 PG (ref 26.5–33)
MCHC RBC AUTO-ENTMCNC: 32.4 G/DL (ref 31.5–36.5)
MCHC RBC AUTO-ENTMCNC: 33.3 G/DL (ref 31.5–36.5)
MCHC RBC AUTO-ENTMCNC: 33.6 G/DL (ref 31.5–36.5)
MCV RBC AUTO: 109 FL (ref 78–100)
MCV RBC AUTO: 127 FL (ref 78–100)
MCV RBC AUTO: 127 FL (ref 78–100)
METAMYELOCYTES # BLD: 0.3 10E9/L
METAMYELOCYTES NFR BLD MANUAL: 1.7 %
MICROCYTES BLD QL SMEAR: PRESENT
MONOCYTES # BLD AUTO: 1.3 10E9/L (ref 0–1.3)
MONOCYTES NFR BLD AUTO: 7 %
NEUTROPHILS # BLD AUTO: 14 10E9/L (ref 1.6–8.3)
NEUTROPHILS NFR BLD AUTO: 77.4 %
NITRATE UR QL: NEGATIVE
NRBC # BLD AUTO: 0.2 10*3/UL
NRBC BLD AUTO-RTO: 1 /100
NT-PROBNP SERPL-MCNC: 600 PG/ML (ref 0–450)
NUM BPU REQUESTED: 3
PH UR STRIP: 6 PH (ref 5–7)
PLATELET # BLD AUTO: 126 10E9/L (ref 150–450)
PLATELET # BLD AUTO: 127 10E9/L (ref 150–450)
PLATELET # BLD AUTO: 95 10E9/L (ref 150–450)
PLATELET # BLD EST: ABNORMAL 10*3/UL
POIKILOCYTOSIS BLD QL SMEAR: ABNORMAL
POLYCHROMASIA BLD QL SMEAR: ABNORMAL
POTASSIUM SERPL-SCNC: 2.9 MMOL/L (ref 3.4–5.3)
POTASSIUM SERPL-SCNC: 3.4 MMOL/L (ref 3.4–5.3)
PROT SERPL-MCNC: 6.3 G/DL (ref 6.8–8.8)
PROT SERPL-MCNC: 6.4 G/DL (ref 6.8–8.8)
RBC # BLD AUTO: 1.09 10E12/L (ref 3.8–5.2)
RBC # BLD AUTO: 1.13 10E12/L (ref 3.8–5.2)
RBC # BLD AUTO: 1.96 10E12/L (ref 3.8–5.2)
RBC #/AREA URNS AUTO: <1 /HPF (ref 0–2)
RBC INCLUSIONS BLD: ABNORMAL
RETICS # AUTO: 40.7 10E9/L (ref 25–95)
RETICS/RBC NFR AUTO: 3.6 % (ref 0.5–2)
SODIUM SERPL-SCNC: 134 MMOL/L (ref 133–144)
SODIUM SERPL-SCNC: 138 MMOL/L (ref 133–144)
SOURCE: ABNORMAL
SP GR UR STRIP: 1.01 (ref 1–1.03)
SPECIMEN EXP DATE BLD: NORMAL
SQUAMOUS #/AREA URNS AUTO: <1 /HPF (ref 0–1)
STOMATOCYTES BLD QL SMEAR: ABNORMAL
TARGETS BLD QL SMEAR: SLIGHT
TIBC SERPL-MCNC: 222 UG/DL (ref 240–430)
TRANSFUSION STATUS PATIENT QL: NORMAL
TROPONIN I SERPL-MCNC: 0.11 UG/L (ref 0–0.04)
TROPONIN I SERPL-MCNC: 0.12 UG/L (ref 0–0.04)
URATE SERPL-MCNC: 6.7 MG/DL (ref 2.6–6)
URATE SERPL-MCNC: 6.8 MG/DL (ref 2.6–6)
UROBILINOGEN UR STRIP-MCNC: NORMAL MG/DL (ref 0–2)
VIT B12 SERPL-MCNC: 1374 PG/ML (ref 193–986)
WBC # BLD AUTO: 13.8 10E9/L (ref 4–11)
WBC # BLD AUTO: 18.1 10E9/L (ref 4–11)
WBC # BLD AUTO: 18.7 10E9/L (ref 4–11)
WBC #/AREA URNS AUTO: 1 /HPF (ref 0–5)

## 2018-11-16 PROCEDURE — 85025 COMPLETE CBC W/AUTO DIFF WBC: CPT | Performed by: INTERNAL MEDICINE

## 2018-11-16 PROCEDURE — 86900 BLOOD TYPING SEROLOGIC ABO: CPT | Performed by: INTERNAL MEDICINE

## 2018-11-16 PROCEDURE — 99285 EMERGENCY DEPT VISIT HI MDM: CPT | Mod: 25 | Performed by: INTERNAL MEDICINE

## 2018-11-16 PROCEDURE — 85384 FIBRINOGEN ACTIVITY: CPT | Performed by: INTERNAL MEDICINE

## 2018-11-16 PROCEDURE — 85384 FIBRINOGEN ACTIVITY: CPT | Performed by: STUDENT IN AN ORGANIZED HEALTH CARE EDUCATION/TRAINING PROGRAM

## 2018-11-16 PROCEDURE — 83605 ASSAY OF LACTIC ACID: CPT | Performed by: INTERNAL MEDICINE

## 2018-11-16 PROCEDURE — 85027 COMPLETE CBC AUTOMATED: CPT | Performed by: INTERNAL MEDICINE

## 2018-11-16 PROCEDURE — 86850 RBC ANTIBODY SCREEN: CPT | Performed by: INTERNAL MEDICINE

## 2018-11-16 PROCEDURE — 93005 ELECTROCARDIOGRAM TRACING: CPT | Performed by: INTERNAL MEDICINE

## 2018-11-16 PROCEDURE — 83540 ASSAY OF IRON: CPT | Performed by: INTERNAL MEDICINE

## 2018-11-16 PROCEDURE — 85652 RBC SED RATE AUTOMATED: CPT | Performed by: INTERNAL MEDICINE

## 2018-11-16 PROCEDURE — 83735 ASSAY OF MAGNESIUM: CPT | Performed by: INTERNAL MEDICINE

## 2018-11-16 PROCEDURE — 83550 IRON BINDING TEST: CPT | Performed by: INTERNAL MEDICINE

## 2018-11-16 PROCEDURE — 99291 CRITICAL CARE FIRST HOUR: CPT | Mod: 25 | Performed by: INTERNAL MEDICINE

## 2018-11-16 PROCEDURE — 93010 ELECTROCARDIOGRAM REPORT: CPT | Mod: 59 | Performed by: INTERNAL MEDICINE

## 2018-11-16 PROCEDURE — 80053 COMPREHEN METABOLIC PANEL: CPT | Performed by: INTERNAL MEDICINE

## 2018-11-16 PROCEDURE — 25000128 H RX IP 250 OP 636: Performed by: STUDENT IN AN ORGANIZED HEALTH CARE EDUCATION/TRAINING PROGRAM

## 2018-11-16 PROCEDURE — P9016 RBC LEUKOCYTES REDUCED: HCPCS | Performed by: INTERNAL MEDICINE

## 2018-11-16 PROCEDURE — 25000132 ZZH RX MED GY IP 250 OP 250 PS 637: Performed by: STUDENT IN AN ORGANIZED HEALTH CARE EDUCATION/TRAINING PROGRAM

## 2018-11-16 PROCEDURE — 93308 TTE F-UP OR LMTD: CPT | Performed by: INTERNAL MEDICINE

## 2018-11-16 PROCEDURE — 12000006 ZZH R&B IMCU INTERMEDIATE UMMC

## 2018-11-16 PROCEDURE — 86901 BLOOD TYPING SEROLOGIC RH(D): CPT | Performed by: INTERNAL MEDICINE

## 2018-11-16 PROCEDURE — 81025 URINE PREGNANCY TEST: CPT | Performed by: INTERNAL MEDICINE

## 2018-11-16 PROCEDURE — 88184 FLOWCYTOMETRY/ TC 1 MARKER: CPT | Performed by: STUDENT IN AN ORGANIZED HEALTH CARE EDUCATION/TRAINING PROGRAM

## 2018-11-16 PROCEDURE — 84484 ASSAY OF TROPONIN QUANT: CPT | Performed by: INTERNAL MEDICINE

## 2018-11-16 PROCEDURE — 96360 HYDRATION IV INFUSION INIT: CPT | Performed by: INTERNAL MEDICINE

## 2018-11-16 PROCEDURE — 83921 ORGANIC ACID SINGLE QUANT: CPT | Performed by: INTERNAL MEDICINE

## 2018-11-16 PROCEDURE — 84550 ASSAY OF BLOOD/URIC ACID: CPT | Performed by: INTERNAL MEDICINE

## 2018-11-16 PROCEDURE — 87389 HIV-1 AG W/HIV-1&-2 AB AG IA: CPT | Performed by: INTERNAL MEDICINE

## 2018-11-16 PROCEDURE — 80076 HEPATIC FUNCTION PANEL: CPT | Performed by: NURSE PRACTITIONER

## 2018-11-16 PROCEDURE — 85045 AUTOMATED RETICULOCYTE COUNT: CPT | Performed by: INTERNAL MEDICINE

## 2018-11-16 PROCEDURE — 40001005 ZZHCL STATISTIC FLOW >15 ABY TC 88189: Performed by: STUDENT IN AN ORGANIZED HEALTH CARE EDUCATION/TRAINING PROGRAM

## 2018-11-16 PROCEDURE — 40000802 ZZH SITE CHECK

## 2018-11-16 PROCEDURE — 83615 LACTATE (LD) (LDH) ENZYME: CPT | Performed by: INTERNAL MEDICINE

## 2018-11-16 PROCEDURE — 83010 ASSAY OF HAPTOGLOBIN QUANT: CPT | Performed by: INTERNAL MEDICINE

## 2018-11-16 PROCEDURE — 88185 FLOWCYTOMETRY/TC ADD-ON: CPT | Performed by: STUDENT IN AN ORGANIZED HEALTH CARE EDUCATION/TRAINING PROGRAM

## 2018-11-16 PROCEDURE — 36415 COLL VENOUS BLD VENIPUNCTURE: CPT | Performed by: INTERNAL MEDICINE

## 2018-11-16 PROCEDURE — 86880 COOMBS TEST DIRECT: CPT | Performed by: INTERNAL MEDICINE

## 2018-11-16 PROCEDURE — 85027 COMPLETE CBC AUTOMATED: CPT | Performed by: NURSE PRACTITIONER

## 2018-11-16 PROCEDURE — 83605 ASSAY OF LACTIC ACID: CPT

## 2018-11-16 PROCEDURE — 99223 1ST HOSP IP/OBS HIGH 75: CPT | Mod: AI | Performed by: INTERNAL MEDICINE

## 2018-11-16 PROCEDURE — 86923 COMPATIBILITY TEST ELECTRIC: CPT | Performed by: INTERNAL MEDICINE

## 2018-11-16 PROCEDURE — 86140 C-REACTIVE PROTEIN: CPT | Performed by: INTERNAL MEDICINE

## 2018-11-16 PROCEDURE — 82607 VITAMIN B-12: CPT | Performed by: INTERNAL MEDICINE

## 2018-11-16 PROCEDURE — 25000128 H RX IP 250 OP 636: Performed by: INTERNAL MEDICINE

## 2018-11-16 PROCEDURE — 85397 CLOTTING FUNCT ACTIVITY: CPT | Performed by: INTERNAL MEDICINE

## 2018-11-16 PROCEDURE — 36415 COLL VENOUS BLD VENIPUNCTURE: CPT | Performed by: NURSE PRACTITIONER

## 2018-11-16 PROCEDURE — 81001 URINALYSIS AUTO W/SCOPE: CPT | Performed by: INTERNAL MEDICINE

## 2018-11-16 PROCEDURE — 40000141 ZZH STATISTIC PERIPHERAL IV START W/O US GUIDANCE

## 2018-11-16 PROCEDURE — 82746 ASSAY OF FOLIC ACID SERUM: CPT | Performed by: NURSE PRACTITIONER

## 2018-11-16 PROCEDURE — 85730 THROMBOPLASTIN TIME PARTIAL: CPT | Performed by: INTERNAL MEDICINE

## 2018-11-16 PROCEDURE — 85610 PROTHROMBIN TIME: CPT | Performed by: INTERNAL MEDICINE

## 2018-11-16 PROCEDURE — 83880 ASSAY OF NATRIURETIC PEPTIDE: CPT | Performed by: INTERNAL MEDICINE

## 2018-11-16 PROCEDURE — 40000611 ZZHCL STATISTIC MORPHOLOGY W/INTERP HEMEPATH TC 85060: Performed by: INTERNAL MEDICINE

## 2018-11-16 PROCEDURE — 80048 BASIC METABOLIC PNL TOTAL CA: CPT | Performed by: INTERNAL MEDICINE

## 2018-11-16 PROCEDURE — 93308 TTE F-UP OR LMTD: CPT | Mod: 26 | Performed by: INTERNAL MEDICINE

## 2018-11-16 RX ORDER — POTASSIUM CHLORIDE 29.8 MG/ML
20 INJECTION INTRAVENOUS
Status: DISCONTINUED | OUTPATIENT
Start: 2018-11-16 | End: 2018-11-18 | Stop reason: HOSPADM

## 2018-11-16 RX ORDER — POTASSIUM CHLORIDE 750 MG/1
20-40 TABLET, EXTENDED RELEASE ORAL
Status: DISCONTINUED | OUTPATIENT
Start: 2018-11-16 | End: 2018-11-18 | Stop reason: HOSPADM

## 2018-11-16 RX ORDER — POTASSIUM CHLORIDE 7.45 MG/ML
10 INJECTION INTRAVENOUS
Status: DISCONTINUED | OUTPATIENT
Start: 2018-11-16 | End: 2018-11-18 | Stop reason: HOSPADM

## 2018-11-16 RX ORDER — NALOXONE HYDROCHLORIDE 0.4 MG/ML
.1-.4 INJECTION, SOLUTION INTRAMUSCULAR; INTRAVENOUS; SUBCUTANEOUS
Status: DISCONTINUED | OUTPATIENT
Start: 2018-11-16 | End: 2018-11-18 | Stop reason: HOSPADM

## 2018-11-16 RX ORDER — POTASSIUM CL/LIDO/0.9 % NACL 10MEQ/0.1L
10 INTRAVENOUS SOLUTION, PIGGYBACK (ML) INTRAVENOUS
Status: DISCONTINUED | OUTPATIENT
Start: 2018-11-16 | End: 2018-11-18 | Stop reason: HOSPADM

## 2018-11-16 RX ORDER — BENZONATATE 100 MG/1
100 CAPSULE ORAL 3 TIMES DAILY PRN
Status: DISCONTINUED | OUTPATIENT
Start: 2018-11-16 | End: 2018-11-18 | Stop reason: HOSPADM

## 2018-11-16 RX ORDER — POTASSIUM CHLORIDE 1.5 G/1.58G
20-40 POWDER, FOR SOLUTION ORAL
Status: DISCONTINUED | OUTPATIENT
Start: 2018-11-16 | End: 2018-11-18 | Stop reason: HOSPADM

## 2018-11-16 RX ORDER — POTASSIUM CHLORIDE 1.5 G/1.58G
40 POWDER, FOR SOLUTION ORAL ONCE
Status: COMPLETED | OUTPATIENT
Start: 2018-11-16 | End: 2018-11-16

## 2018-11-16 RX ORDER — MAGNESIUM SULFATE HEPTAHYDRATE 40 MG/ML
4 INJECTION, SOLUTION INTRAVENOUS EVERY 4 HOURS PRN
Status: DISCONTINUED | OUTPATIENT
Start: 2018-11-16 | End: 2018-11-18 | Stop reason: HOSPADM

## 2018-11-16 RX ADMIN — POTASSIUM CHLORIDE 40 MEQ: 750 TABLET, EXTENDED RELEASE ORAL at 22:08

## 2018-11-16 RX ADMIN — POTASSIUM CHLORIDE 40 MEQ: 1.5 POWDER, FOR SOLUTION ORAL at 20:43

## 2018-11-16 RX ADMIN — CALCIUM GLUCONATE 1 G: 98 INJECTION, SOLUTION INTRAVENOUS at 22:20

## 2018-11-16 RX ADMIN — BENZONATATE 100 MG: 100 CAPSULE, LIQUID FILLED ORAL at 23:29

## 2018-11-16 RX ADMIN — MAGNESIUM SULFATE IN WATER 4 G: 40 INJECTION, SOLUTION INTRAVENOUS at 22:08

## 2018-11-16 RX ADMIN — SODIUM CHLORIDE 1000 ML: 9 INJECTION, SOLUTION INTRAVENOUS at 13:22

## 2018-11-16 ASSESSMENT — ACTIVITIES OF DAILY LIVING (ADL)
AMBULATION: 0-->INDEPENDENT
TRANSFERRING: 0-->INDEPENDENT
DRESS: 0-->INDEPENDENT
RETIRED_COMMUNICATION: 0-->UNDERSTANDS/COMMUNICATES WITHOUT DIFFICULTY
FALL_HISTORY_WITHIN_LAST_SIX_MONTHS: NO
BATHING: 0-->INDEPENDENT
COGNITION: 0 - NO COGNITION ISSUES REPORTED
TOILETING: 0-->INDEPENDENT
RETIRED_EATING: 0-->INDEPENDENT
SWALLOWING: 0-->SWALLOWS FOODS/LIQUIDS WITHOUT DIFFICULTY

## 2018-11-16 ASSESSMENT — ENCOUNTER SYMPTOMS
ABDOMINAL PAIN: 0
BLOOD IN STOOL: 0
SHORTNESS OF BREATH: 1
ANAL BLEEDING: 0
DIZZINESS: 1
BRUISES/BLEEDS EASILY: 0

## 2018-11-16 NOTE — TELEPHONE ENCOUNTER
Reached out to patient again via phone and spoke with her to relay results and recommendations from provider below. Provided information for [facility] emergency department.  She voices understanding, states she will do her best to get in today.  RN reiterated that this is very important and she should be seen for this today.      Edilma Rodriguez RN

## 2018-11-16 NOTE — IP AVS SNAPSHOT
[address] [address]    Phone:  [phone]                                       After Visit Summary   11/16/2018    Karla Gordon    MRN: [id number]           After Visit Summary Signature Page     I have received my discharge instructions, and my questions have been answered. I have discussed any challenges I see with this plan with the nurse or doctor.    ..........................................................................................................................................  Patient/Patient Representative Signature      ..........................................................................................................................................  Patient Representative Print Name and Relationship to Patient    ..................................................               ................................................  Date                                   Time    ..........................................................................................................................................  Reviewed by Signature/Title    ...................................................              ..............................................  Date                                               Time          22EPIC Rev 08/18

## 2018-11-16 NOTE — IP AVS SNAPSHOT
MRN:[id number]                      After Visit Summary   11/16/2018    Karla Gordon    MRN: [id number]           Thank you!     Thank you for choosing [facility] for your care. Our goal is always to provide you with excellent care. Hearing back from our patients is one way we can continue to improve our services. Please take a few minutes to complete the written survey that you may receive in the mail after you visit with us. Thank you!        Patient Information     Date Of Birth          1990        Designated Caregiver       Most Recent Value    Caregiver    Will someone help with your care after discharge? yes    Name of designated caregiver Dony    Phone number of caregiver [phone]    Caregiver address [place]      About your hospital stay     You were admitted on:  November 16, 2018 You last received care in the:  [address] [facility]    You were discharged on:  November 18, 2018        Reason for your hospital stay       You were hospitalized for severe anemia due to severe folate deficiency. This is a result of alcohol use. Additionally, while you were hospitalized there were findings on lab and imaging that show alcohol related liver damage.                  Who to Call     For medical emergencies, please call 911.  For non-urgent questions about your medical care, please call your primary care provider or clinic, [phone]          Attending Provider     Provider Specialty    Katherine Mathew MD Internal Medicine    Kayla Smith MD Internal Medicine    [facility], Huey Bonilla MD Internal Medicine       Primary Care Provider Office Phone # Fax #    [facility] [phone]      After Care Instructions     Activity       Your activity upon discharge: activity as tolerated            Diet       Follow this diet upon discharge: Orders Placed This Encounter      Combination Diet Regular Diet Adult                  Follow-up  Appointments     Follow Up and recommended labs and tests       Follow up with primary care provider, [facility], within 7 days to evaluate medication change, for hospital follow- up and to follow up on results.  The following labs/tests are recommended: hemoglobin, CBC, pending heme labs from inpatient stay.                  Your next 10 appointments already scheduled     Nov 19, 2018 11:00 AM CST   SHORT with Millie Moore NP   [facility]    [facility] [facility] [address] [phone]              Additional Services     ONC/HEME ADULT REFERRAL       Your provider has referred you to: [facility]: Cancer Care/Hematology (All Cancer Related Services) - [place] [phone]   [email or web address]  Dr. Osborn    Please be aware that coverage of these services is subject to the terms and limitations of your health insurance plan.  Call member services at your health plan with any benefit or coverage questions.      Please bring the following with you to your appointment:    (1) Any X-Rays, CTs or MRIs which have been performed.  Contact the facility where they were done to arrange for  prior to your scheduled appointment.   (2) List of current medications  (3) This referral request   (4) Any documents/labs given to you for this referral                  Pending Results     Date and Time Order Name Status Description    11/17/2018 1341 Hepatitis C Screen Reflex to HCV RNA Quant and Genotype In process     11/17/2018 1341 Hepatitis B surface antigen In process     11/17/2018 1341 Hepatitis B core antibody In process     11/17/2018 1341 Hepatitis B Surface Antibody In process     11/17/2018 1341 HIV Antigen Antibody Combo In process     11/17/2018 1341 Parvovirus B19 DNA PCR (Blood or Bone Marrow) In process     11/17/2018 1341 Parvovirus B19 Antibody IgM In process      "11/17/2018 1341 Parvovirus B19 Antibody IgG In process     11/17/2018 1341 CMV antibody IgM In process     11/17/2018 1341 CMV Antibody IgG In process     11/17/2018 1341 EBV Capsid Antibody IgM In process     11/17/2018 1341 EBV Capsid Antibody IgG In process     11/17/2018 1033 Urine Culture Aerobic Bacterial Preliminary     11/17/2018 0453 EKG 12-lead, complete Preliminary     11/17/2018 0056 EKG 12-lead, complete Preliminary     11/16/2018 1614 Leukemia Lymphoma Evaluation (Flow Cytometry) In process     11/16/2018 1246 HIV Antigen Antibody Combo In process     11/16/2018 1246 Methylmalonic acid In process     11/16/2018 1246 Haptoglobin In process     11/16/2018 1246 OFOIVH27 Activity w Reflex to Inhib Ginger In process     11/16/2018 1236 Blood Morphology Pathologist Review In process             Statement of Approval     Ordered          11/18/18 1130  I have reviewed and agree with all the recommendations and orders detailed in this document.  EFFECTIVE NOW     Approved and electronically signed by:  Huey Marie MD             Admission Information     Date & Time Provider Department Dept. Phone    11/16/2018 Huey Marie MD [address] [facility] [phone]      Your Vitals Were     Blood Pressure Pulse Temperature Respirations Height Weight    99/48 (BP Location: Right arm) 100 98.7  F (37.1  C) (Oral) 16 1.676 m (5' 6\") 81.7 kg (180 lb 1.9 oz)    Pulse Oximetry BMI (Body Mass Index)                97% 29.07 kg/m2          Careers360 Information     Careers360 lets you send messages to your doctor, view your test results, renew your prescriptions, schedule appointments and more. To sign up, go to [email or web address] . Click on \"Log in\" on the left side of the screen, which will take you to the Welcome page. Then click on \"Sign up Now\" on the right side of the page.     You will be asked to enter the access code listed below, as well as some personal information. Please follow the directions to " create your username and password.     Your access code is: [email or web address]  Expires: 2019 11:52 AM     Your access code will  in 90 days. If you need help or a new code, please call your [place] clinic or [phone].        Care EveryWhere ID     This is your Care EveryWhere ID. This could be used by other organizations to access your [place] [facility] medical records  [id number]        Equal Access to Services     [facility] NELIDA : Hadii aad ku hadasho Soomaali, waaxda luqadaha, qaybta kaalmada adeegyada, waxay idiin hayaan adeeg edunikosfei lajoshua . So [facility] [phone].    ATENCIÓN: Si habla español, tiene a hobbs disposición servicios gratuitos de asistencia lingüística. [facility] [phone].    We comply with applicable federal civil rights laws and [place] laws. We do not discriminate on the basis of race, color, national origin, age, disability, sex, sexual orientation, or gender identity.               Review of your medicines      START taking        Dose / Directions    cyanocobalamin 100 MCG Tabs tablet   Commonly known as:  vitamin  B-12   Used for:  Macrocytic anemia        Dose:  100 mcg   Start taking on:  2018   Take 1 tablet (100 mcg) by mouth daily   Quantity:  150 tablet   Refills:  0       folic acid 1 MG tablet   Commonly known as:  FOLVITE   Used for:  Macrocytic anemia        Dose:  5 mg   Start taking on:  2018   Take 5 tablets (5 mg) by mouth daily   Quantity:  60 tablet   Refills:  1         STOP taking     diphenhydrAMINE-acetaminophen  MG tablet   Commonly known as:  TYLENOL PM                Where to get your medicines      These medications were sent to [id number] - [place], [place] - [address] AT [facility] [address], [place] [address]     Phone:  [phone]     cyanocobalamin 100 MCG Tabs tablet    folic acid 1 MG tablet                Protect others around you: Learn how to safely use, store and  throw away your medicines at www.disposemymeds.org.             Medication List: This is a list of all your medications and when to take them. Check marks below indicate your daily home schedule. Keep this list as a reference.      Medications           Morning Afternoon Evening Bedtime As Needed    cyanocobalamin 100 MCG Tabs tablet   Commonly known as:  vitamin  B-12   Take 1 tablet (100 mcg) by mouth daily   Start taking on:  11/19/2018   Last time this was given:  100 mcg on 11/18/2018  8:08 AM                                folic acid 1 MG tablet   Commonly known as:  FOLVITE   Take 5 tablets (5 mg) by mouth daily   Start taking on:  11/19/2018   Last time this was given:  5 mg on 11/18/2018  8:07 AM

## 2018-11-16 NOTE — TELEPHONE ENCOUNTER
I received critical lab result from Jazlyn in lab.  Hemoglobin = 4.6.    I called and left a voicemail for patient to call the clinic back as soon as possible to discuss concerning lab result.    I want patient to go to the emergency room as she will need blood transfusion today and additional monitoring and work-up.    Please continue to try to reach patient today.  I had left on voicemail her the [phone] number to call us back.    Of note: She went to [facility] in [place] yesterday and also had a Hemoglobin of 4.6 yesterday and they told her to follow up with us today.

## 2018-11-16 NOTE — ED TRIAGE NOTES
Pt arrives to ED with reports of criticall hbg. Pt was seen at clinic this morning for feeling dizzy and weak the past few days, labs were drawn. Pt was called with Hbg 4.6 and sent to ER. Upon arrival, pt is pale with complaints of nausea and poor appetite the past month. Pt reports she is a daily drinker. A&O, 's in triage, /44 - pt reports her BP runs low. Denies any blood in stool.

## 2018-11-16 NOTE — PHARMACY-ADMISSION MEDICATION HISTORY
Admission medication history interview status for the 11/16/2018 admission is complete. See Epic admission navigator for allergy information, pharmacy, prior to admission medications and immunization status.     Medication history interview sources:  patient     Changes made to PTA medication list (reason)  Added: none  Deleted: none  Changed: none    Additional medication history information (including reliability of information, actions taken by pharmacist):  - added escitalopram and sertraline to allergies. Both caused severe nausea and patient does not wish to take again.   - patient denies any other medication use including OTCs, herbals, and supplements.       Prior to Admission medications    Medication Sig Last Dose Taking? Auth Provider   diphenhydrAMINE-acetaminophen (TYLENOL PM)  MG tablet Take 2 tablets by mouth nightly as needed for sleep 11/15/2018 at pm Yes Reported, Patient         Medication history completed by:   Liz Davies, PharmD  Pager: [phone]

## 2018-11-16 NOTE — ED PROVIDER NOTES
"  History     Chief Complaint   Patient presents with     Abnormal Labs     HPI  Karla Gordon is a 28 year old female who presents for evaluation of abnormal laboratory studies. The patient complains over the past 4-5 days she has had dizziness, shortness of breath with exertion, and a generalized sensation that she feels \"heavy\". She also reports bilateral lower extremity swelling that began about one week ago. She denies abdominal pain, black stools or bloody stools. No other abnormal bleeding. She does not take any medications. The patient does admit to drinking alcohol daily. The patient was seen at an outside Urgent Care for her symptoms yesterday at which time she had a chest x-ray as well as laboratory studies performed. Her chest x-ray returned negative, but her laboratory studies showed a low hemoglobin of 4.3 as well as low platelets at 82 for which she was referred here to the Emergency Department.     Chest x-ray @ [facility] (11/15/18) Impression:   Lungs clear. Pulmonary vascularity normal. No effusion. Right  shoulder arthroplasty.    I have reviewed the Medications, Allergies, Past Medical and Surgical History, and Social History in the [facility] system.  Past Medical History:   Diagnosis Date     Tobacco use disorder 3/21/2018       Past Surgical History:   Procedure Laterality Date     C ANESTH,SHOULDER REPLACEMENT Right 2015    right partial shoulder replacement at age 25     HC TOOTH EXTRACTION W/FORCEP  2015       Family History   Problem Relation Age of Onset     Cancer Mother 62     bladder cancer     Hypertension Mother      Psoriasis Father      No Known Problems Brother      No Known Problems Sister      No Known Problems Son      Diabetes No family hx of      Hyperlipidemia No family hx of      Breast Cancer No family hx of      Colon Cancer No family hx of        Social History   Substance Use Topics     Smoking status: Current Every Day Smoker     Packs/day: 0.50     Years: " "7.00     Types: Cigarettes     Smokeless tobacco: Current User     Alcohol use Yes      Comment: daily, few drinks a day       No current facility-administered medications for this encounter.      Current Outpatient Prescriptions   Medication     diphenhydrAMINE-acetaminophen (TYLENOL PM)  MG tablet        Allergies   Allergen Reactions     Spinach      Mouth gets numb       Review of Systems   Respiratory: Positive for shortness of breath.    Cardiovascular: Positive for leg swelling.   Gastrointestinal: Negative for abdominal pain, anal bleeding and blood in stool.   Genitourinary: Negative for vaginal bleeding.   Neurological: Positive for dizziness.   Hematological: Does not bruise/bleed easily.   All other systems reviewed and are negative.      Physical Exam   BP: 100/44  Pulse: 120  Temp: 98.8  F (37.1  C)  Resp: 18  Height: 167.6 cm (5' 6\")  Weight: 81.9 kg (180 lb 8 oz)  SpO2: 100 %      Physical Exam   Constitutional: She is oriented to person, place, and time. No distress.   HENT:   Head: Atraumatic.   Mouth/Throat: Oropharynx is clear and moist. No oropharyngeal exudate.   Eyes: Pupils are equal, round, and reactive to light. No scleral icterus.   Pale conjunctivae, ?icteric sclerae   Neck: Neck supple. No JVD present.   Cardiovascular: Normal rate, normal heart sounds and intact distal pulses.  Exam reveals no gallop and no friction rub.    No murmur heard.  Pulmonary/Chest: Effort normal and breath sounds normal. No respiratory distress. She has no wheezes. She has no rales. She exhibits no tenderness.   Abdominal: Soft. Bowel sounds are normal. She exhibits no distension and no mass. There is no tenderness. There is no rebound and no guarding.   Musculoskeletal: She exhibits no edema or tenderness.   Neurological: She is alert and oriented to person, place, and time. No cranial nerve deficit. Coordination normal.   Skin: Skin is warm. No rash noted. She is not diaphoretic.       ED Course     ED " Course     Procedures  Results for orders placed during the hospital encounter of 11/16/18   POC US ECHO LIMITED    Impression Limited Bedside Cardiac Ultrasound, performed and interpreted by me.   Indication: Chest Pain.  Parasternal long axis, parasternal short axis, apical 4 chamber and subcostal views were acquired.   Image quality was satisfactory.    Findings:    Global left ventricular function appears intact.  Chambers do not appear dilated.  There is no evidence of free fluid within the pericardium.    IMPRESSION: Grossly normal left ventricular function and chamber size.  No pericardial effusion..       12:29 PM  The patient was seen and examined by Dr. Mathew in Room 6.          EKG Interpretation:      Interpreted by Katherine Mathew MD  Time reviewed: 12:48  Symptoms at time of EKG: low hemoglobin   Rhythm: sinus tachycardia  Rate: 108 bpm  Axis: Normal  Ectopy: none  Conduction: cannot rule out anterior infarct (age undetermined)  ST Segments/ T Waves: No ST-T wave changes  Q Waves: none  Comparison to prior: No old EKG available    Clinical Impression: sinus tachycardia, otherwise normal EKG      Results for orders placed or performed during the hospital encounter of 11/16/18 (from the past 24 hour(s))   CBC with platelets differential     Status: Abnormal    Collection Time: 11/16/18 12:46 PM   Result Value Ref Range    WBC 18.1 (H) 4.0 - 11.0 10e9/L    RBC Count 1.13 (L) 3.8 - 5.2 10e12/L    Hemoglobin 4.8 (LL) 11.7 - 15.7 g/dL    Hematocrit 14.3 (L) 35.0 - 47.0 %     (H) 78 - 100 fl    MCH 42.5 (H) 26.5 - 33.0 pg    MCHC 33.6 31.5 - 36.5 g/dL    RDW 23.0 (H) 10.0 - 15.0 %    Platelet Count 126 (L) 150 - 450 10e9/L    Diff Method Manual Differential     % Neutrophils 77.4 %    % Lymphocytes 13.9 %    % Monocytes 7.0 %    % Eosinophils 0.0 %    % Basophils 0.0 %    % Metamyelocytes 1.7 %    Nucleated RBCs 1 (H) 0 /100    Absolute Neutrophil 14.0 (H) 1.6 - 8.3 10e9/L    Absolute Lymphocytes 2.5  0.8 - 5.3 10e9/L    Absolute Monocytes 1.3 0.0 - 1.3 10e9/L    Absolute Eosinophils 0.0 0.0 - 0.7 10e9/L    Absolute Basophils 0.0 0.0 - 0.2 10e9/L    Absolute Metamyelocytes 0.3 (H) 0 10e9/L    Absolute Nucleated RBC 0.2     Anisocytosis Marked     Poikilocytosis Moderate     Polychromasia Moderate     RBC Fragments Moderate     Stomatocytes Moderate     Target Cells Slight     Microcytes Present     Macrocytes Present     Platelet Estimate Confirming automated cell count    ABO/Rh type and screen     Status: None    Collection Time: 11/16/18 12:46 PM   Result Value Ref Range    Units Ordered 2     ABO A     RH(D) Pos     Antibody Screen Neg     Test Valid Only At          [facility]    Specimen Expires 11/19/2018     Crossmatch Red Blood Cells    INR     Status: Abnormal    Collection Time: 11/16/18 12:46 PM   Result Value Ref Range    INR 1.30 (H) 0.86 - 1.14   Partial thromboplastin time     Status: None    Collection Time: 11/16/18 12:46 PM   Result Value Ref Range    PTT 30 22 - 37 sec   Comprehensive metabolic panel     Status: Abnormal    Collection Time: 11/16/18 12:46 PM   Result Value Ref Range    Sodium 134 133 - 144 mmol/L    Potassium 3.4 3.4 - 5.3 mmol/L    Chloride 97 94 - 109 mmol/L    Carbon Dioxide 27 20 - 32 mmol/L    Anion Gap 10 3 - 14 mmol/L    Glucose 98 70 - 99 mg/dL    Urea Nitrogen 4 (L) 7 - 30 mg/dL    Creatinine 0.45 (L) 0.52 - 1.04 mg/dL    GFR Estimate >90 >60 mL/min/1.7m2    GFR Estimate If Black >90 >60 mL/min/1.7m2    Calcium 6.4 (L) 8.5 - 10.1 mg/dL    Bilirubin Total 2.0 (H) 0.2 - 1.3 mg/dL    Albumin 2.9 (L) 3.4 - 5.0 g/dL    Protein Total 6.4 (L) 6.8 - 8.8 g/dL    Alkaline Phosphatase 185 (H) 40 - 150 U/L    ALT 39 0 - 50 U/L     (H) 0 - 45 U/L   Lactic acid whole blood     Status: Abnormal    Collection Time: 11/16/18 12:46 PM   Result Value Ref Range    Lactic Acid 3.0 (H) 0.7 - 2.0 mmol/L   CRP inflammation     Status:  Abnormal    Collection Time: 11/16/18 12:46 PM   Result Value Ref Range    CRP Inflammation 11.0 (H) 0.0 - 8.0 mg/L   Erythrocyte sedimentation rate auto     Status: Abnormal    Collection Time: 11/16/18 12:46 PM   Result Value Ref Range    Sed Rate 102 (H) 0 - 20 mm/h   Nt probnp inpatient (BNP)     Status: Abnormal    Collection Time: 11/16/18 12:46 PM   Result Value Ref Range    N-Terminal Pro BNP Inpatient 600 (H) 0 - 450 pg/mL   Troponin I     Status: Abnormal    Collection Time: 11/16/18 12:46 PM   Result Value Ref Range    Troponin I ES 0.107 (H) 0.000 - 0.045 ug/L   Reticulocyte count     Status: Abnormal    Collection Time: 11/16/18 12:46 PM   Result Value Ref Range    % Retic 3.6 (H) 0.5 - 2.0 %    Absolute Retic 40.7 25 - 95 10e9/L   Iron and iron binding capacity     Status: Abnormal    Collection Time: 11/16/18 12:46 PM   Result Value Ref Range    Iron 40 35 - 180 ug/dL    Iron Binding Cap 222 (L) 240 - 430 ug/dL    Iron Saturation Index 18 15 - 46 %   Vitamin B12     Status: Abnormal    Collection Time: 11/16/18 12:46 PM   Result Value Ref Range    Vitamin B12 1374 (H) 193 - 986 pg/mL   Direct antiglobulin test     Status: None    Collection Time: 11/16/18 12:46 PM   Result Value Ref Range    SHAMIR  Broad Spectrum Neg    Lactate Dehydrogenase     Status: Abnormal    Collection Time: 11/16/18 12:46 PM   Result Value Ref Range    Lactate Dehydrogenase 1133 (H) 81 - 234 U/L   Blood component     Status: None    Collection Time: 11/16/18 12:46 PM   Result Value Ref Range    Unit Number [id number]     Blood Component Type Red Blood Cells Leukocyte Reduced     Division Number 00     Status of Unit Released to care unit 11/16/2018 1350     Blood Product Code [id number]     Unit Status ISS    Blood component     Status: None    Collection Time: 11/16/18 12:46 PM   Result Value Ref Range    Unit Number [id number]     Blood Component Type Red Blood Cells Leukocyte Reduced     Division Number 00     Status of  Unit Ready for patient 11/16/2018 1338     Blood Product Code X9475L53     Unit Status TIP    EKG 12-lead, tracing only     Status: None (Preliminary result)    Collection Time: 11/16/18 12:48 PM   Result Value Ref Range    Interpretation ECG Click View Image link to view waveform and result    POC US ECHO LIMITED     Status: None    Collection Time: 11/16/18  1:28 PM    Impression    Limited Bedside Cardiac Ultrasound, performed and interpreted by me.   Indication: Chest Pain.  Parasternal long axis, parasternal short axis, apical 4 chamber and subcostal views were acquired.   Image quality was satisfactory.    Findings:    Global left ventricular function appears intact.  Chambers do not appear dilated.  There is no evidence of free fluid within the pericardium.    IMPRESSION: Grossly normal left ventricular function and chamber size.  No pericardial effusion..           Labs Ordered and Resulted from Time of ED Arrival Up to the Time of Departure from the ED   CBC WITH PLATELETS DIFFERENTIAL - Abnormal; Notable for the following:        Result Value    WBC 18.1 (*)     RBC Count 1.13 (*)     Hemoglobin 4.8 (*)     Hematocrit 14.3 (*)      (*)     MCH 42.5 (*)     RDW 23.0 (*)     Platelet Count 126 (*)     Nucleated RBCs 1 (*)     Absolute Neutrophil 14.0 (*)     Absolute Metamyelocytes 0.3 (*)     All other components within normal limits   INR - Abnormal; Notable for the following:     INR 1.30 (*)     All other components within normal limits   COMPREHENSIVE METABOLIC PANEL - Abnormal; Notable for the following:     Urea Nitrogen 4 (*)     Creatinine 0.45 (*)     Calcium 6.4 (*)     Bilirubin Total 2.0 (*)     Albumin 2.9 (*)     Protein Total 6.4 (*)     Alkaline Phosphatase 185 (*)      (*)     All other components within normal limits   LACTIC ACID WHOLE BLOOD - Abnormal; Notable for the following:     Lactic Acid 3.0 (*)     All other components within normal limits   CRP INFLAMMATION -  Abnormal; Notable for the following:     CRP Inflammation 11.0 (*)     All other components within normal limits   ERYTHROCYTE SEDIMENTATION RATE AUTO - Abnormal; Notable for the following:     Sed Rate 102 (*)     All other components within normal limits   NT PROBNP INPATIENT - Abnormal; Notable for the following:     N-Terminal Pro BNP Inpatient 600 (*)     All other components within normal limits   TROPONIN I - Abnormal; Notable for the following:     Troponin I ES 0.107 (*)     All other components within normal limits   RETICULOCYTE COUNT - Abnormal; Notable for the following:     % Retic 3.6 (*)     All other components within normal limits   IRON AND IRON BINDING CAPACITY - Abnormal; Notable for the following:     Iron Binding Cap 222 (*)     All other components within normal limits   VITAMIN B12 - Abnormal; Notable for the following:     Vitamin B12 1374 (*)     All other components within normal limits   LACTATE DEHYDROGENASE - Abnormal; Notable for the following:     Lactate Dehydrogenase 1133 (*)     All other components within normal limits   PARTIAL THROMBOPLASTIN TIME   BLOOD MORPHOLOGY PATHOLOGIST REVIEW   [id number] ACT W REFLX   FIBRINOGEN ACTIVITY   ROUTINE UA WITH MICROSCOPIC   CARDIAC CONTINUOUS MONITORING   HCG QUAL URINE POCT   ABO/RH TYPE AND SCREEN   DIRECT ANTIGLOBULIN TEST   RED BLOOD CELL PREPARE ORDER UNIT   BLOOD COMPONENT   BLOOD COMPONENT         Critical Care time was 40 minutes for this patient excluding procedures.     Assessments & Plan (with Medical Decision Making)  Anemia appears to be acute, etiology not clear but smear showed mod fragmentated RBC and very elevated LDH suggestive for microangiopathic hemolysis, other work up including smear ordered, PRBC 2 unit(s) ordered, D/W Hem admitting fellow-recommending us to admit to Medicine with Heme consult, Heme consult fellow informed, D/W Medicine-figure out if admit to Medicine or Heme. Although neuro intact, no JADE, still  possible early TTP.       I have reviewed the nursing notes.    I have reviewed the findings, diagnosis, plan and need for follow up with the patient.    New Prescriptions    No medications on file       Final diagnoses:   Anemia, unspecified type   I, Maria L Adam, am serving as a trained medical scribe to document services personally performed by Katherine Mathew MD, based on the provider's statements to me.   I, Katherine Mathew MD, was physically present and have reviewed and verified the accuracy of this note documented by Maria L Adam.      11/16/2018   [facility], [place], EMERGENCY DEPARTMENT     Katherine Mathew MD  11/16/18 1500       Katherine Mathew MD  11/16/18 2876

## 2018-11-16 NOTE — CONSULTS
[facility], [place]   Hematology/Oncology Consult Note    Karla Gordon MRN# [id number]   Age: 28 year old YOB: 1990          Reason for Consult:   Anemia and thrombocytopenia         Assessment and Plan:   Karla Gordon is a 28 year old w/PMHx of mood disorder and tobacco use disorder who presented to the ED on 11/16/18 due to symptomatic anemia (4.6) and thrombocytopenia (82) with no overt signs of bleeding, initially noted at an Urgent Care Center the day prior.     Initial studies are significant for a macrocytic anemia, thrombocytopenia, and leukocytosis, concerning for abnormal nutritional status of patient.  However vitamin B-12 is elevated.  MMA and folic acid pending.  Reticulocyte count is elevated. Patient does have elevated INR at 1.3, in addition to hypoalbuminemia at 2.9, PTT is normal. Preliminary review of blood smear by team was non-indicative of hemolysis, but did show macrothrombocytes w/o clumping and hypersegmented neutrophils. Considerations include malignant process, thus leukemia/lymphoma flow cytometry was ordered. Further testing will be based on results of pending studies.    Problem List  # Macrocytic Anemia  # Thrombocytopenia  # Leukocytosis      Summary of Recommendations:    f/u lab studies MMA, folic acid, uric acid, leukemia/lymphoma flow cytometry, final peripheral smear read    vitamin/electrolyte premix (banana bag)    recommend GI consult for consideration of occult bleeding workup (upper endoscopy and colonoscopy)    Thank you for involving us in the care of this patient. We will continue to follow during the hospitalization.    Patient seen and plan of care discussed with Dr. Osborn.    Michael Beach MD, PhD  Internal Medicine, PGY-1  Pager: [phone]  11/16/2018           History of Present Illness:   History obtained from chart review and confirmed with patient.    Karla Gordon is a 28 year old w/PMHx of  "mood disorder and tobacco use disorder who presented to the ED on 11/16/18 due to anemia (4.6) and thrombocytopenia (82) noted at an Urgent Care Center the day prior.   The patient states that over the past 4-5 days, she has been feeling unwell in that she noted facial fullness and dizziness upon standing. As the week progressed, she had exertional dyspnea with minimal exertion. Patient states that over the past few weeks she has had multiple changes to her anxiety medications due to nausea and vomiting. She endorses abdominal bloating. Two days ago, she noted swelling in the bilateral lower extremities.Denies hematemesis, melena, bruising, adenopathy, abdominal pain, chest pain.   Patient states that she was once told that she was a \"bleeder\" after her shoulder surgery, but was unsure what that meant.  She was once told that she had anemia, also once told that she had a low platelet count.  She denies heavy menstrual periods.  She states that she does not know when her last menstrual occurred.  It has been at least 2 months and she has taken pregnancy tests that have been negative.  Past pregnancy was not complicated by heavy bleeding.  Denies use of NSAIDs, aspirin, herbal medications.  Patient endorses daily alcohol use. Will have up to 3-4 shots at a time, but occasionally more. Smokes 0.5 cigarettes per day. Denies recreational blood use.   No family history of bleeding or clotting disorder.       Review of Systems:   A comprehensive ROS was performed with the patient and was found to be negative with the exception of that noted in the HPI above.       Past Medical History:     Past Medical History:   Diagnosis Date     Tobacco use disorder 3/21/2018            Past Surgical History:     Past Surgical History:   Procedure Laterality Date     C ANESTH,SHOULDER REPLACEMENT Right 2015    right partial shoulder replacement at age 25     HC TOOTH EXTRACTION W/FORCEP  2015            Social History:     Social " "History     Social History     Marital status:      Spouse name: N/A     Number of children: N/A     Years of education: N/A     Occupational History     Not on file.     Social History Main Topics     Smoking status: Current Every Day Smoker     Packs/day: 0.50     Years: 7.00     Types: Cigarettes     Smokeless tobacco: Current User     Alcohol use Yes      Comment: daily, few drinks a day     Drug use: No     Sexual activity: Yes     Partners: Male     Birth control/ protection: Condom     Other Topics Concern     Not on file     Social History Narrative            Family History:     Family History   Problem Relation Age of Onset     Cancer Mother 62     bladder cancer     Hypertension Mother      Psoriasis Father      No Known Problems Brother      No Known Problems Sister      No Known Problems Son      Diabetes No family hx of      Hyperlipidemia No family hx of      Breast Cancer No family hx of      Colon Cancer No family hx of             Allergies:     Allergies   Allergen Reactions     Spinach      Mouth gets numb     Lexapro [Escitalopram] Nausea     Severe nausea; does not wish to take again     Sertraline Nausea     Severe nausea; does not wish to take again            Medications:     (Not in a hospital admission)         Physical Exam:   /76  Pulse 102  Temp 99.5  F (37.5  C)  Resp 16  Ht 1.676 m (5' 6\")  Wt 81.9 kg (180 lb 8 oz)  SpO2 97%  BMI 29.13 kg/m2  Vitals:    11/16/18 1216   Weight: 81.9 kg (180 lb 8 oz)     General: Appears well, lying in bed, in no acute distress.  Heme/Lymph: No overt bleeding. No cervical, axillary, or supraclavicular adenopathy.  Skin: No concerning pallor, lesions, rash, jaundice, cyanosis, erythema, or ecchymoses on exposed surfaces.  HEENT: NCAT. EOMI, anicteric sclera. Neck nontender, supple.  Respiratory: Non-labored breathing, good air exchange, lungs clear to auscultation bilaterally.  Cardiovascular: Tachycardia, regular rhythm, no murmur or " rub.   Gastrointestinal: Normoactive bowel sounds. Abdomen soft, non-distended, and non-tender. No palpable masses or organomegaly.  Extremities: Bilateral LE WWP, 3+ pitting edema  Neurologic: A&O x 3, speech and mentation normal         Data:   I have personally reviewed the following labs/imaging:  CBC    Recent Labs  Lab 11/16/18  1246 11/16/18  1003   WBC 18.1* 18.7*   RBC 1.13* 1.09*   HGB 4.8* 4.6*   HCT 14.3* 13.8*   * 127*   MCH 42.5* 42.2*   MCHC 33.6 33.3   RDW 23.0* 21.7*   * 127*     CMP    Recent Labs  Lab 11/16/18  1246      POTASSIUM 3.4   CHLORIDE 97   CO2 27   ANIONGAP 10   GLC 98   BUN 4*   CR 0.45*   GFRESTIMATED >90   GFRESTBLACK >90   SUMANTH 6.4*   PROTTOTAL 6.4*   ALBUMIN 2.9*   BILITOTAL 2.0*   ALKPHOS 185*   *   ALT 39     INR    Recent Labs  Lab 11/16/18  1246   INR 1.30*

## 2018-11-16 NOTE — H&P
"[facility], [place]    Internal Medicine History and Physical - [place] 2 Service       Date of Admission:  11/16/2018    Chief Complaint   Dizzy, low energy    History is obtained from the patient    History of Present Illness   Karla Gordon is a 28 year old female  who has a history of HEELP and is admitted for anemia.    Patient presented to urgent care feeling dizzy and \"heavy\", winded. Was found to have a hgb of 4.6 and sent to ED. Pt started feeling dizzy upon standing on 11/12. Then progressed until 11/14 when it became too much to deal with and pt went to urgent care.     Anemic during high school and in pregnancy, states easy bruising. No bleeding, no blood in bm, no melena, no hematuria. Pt reports that she has not menstruated in some time, unable to remember LMP. Has had 2 negative home preg tests. When she was menstruating they lasted for about 4 days and were not heavy. 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 2 year old was recently sick with what sounds like URI 2-3wks ago. Pt had mild URI symptoms around that time as well.     Pt reports med changes 1 mo ago, tried lexapro for anxiety, had significant n/v so discontinued.     Review of Systems    Gen: +weightloss, +dizzy/lightheaded, -fevers  Heme: -heavy menstrual periods, -bleeding,-melena, +easy bruising  Resp: +dyspnea on exertion  : amenhorrea, -urinary symptoms  Psych: +anxiety  Card: +edema  Skin: -rash  GI: No bleed no BM changes  Endo:+cold intolerance  The rest of 10 point ROS otherwise negative unless stated in HPI      Past Medical History    I have reviewed this patient's medical history and updated it with pertinent information if needed.   Past Medical History:   Diagnosis Date     Tobacco use disorder 3/21/2018       Past Surgical History   I have reviewed this patient's surgical history and updated it with pertinent information if needed.  Past Surgical History:   Procedure Laterality Date     C " ANESTH,SHOULDER REPLACEMENT Right 2015    right partial shoulder replacement at age 25     HC TOOTH EXTRACTION W/FORCEP  2015        Social History   Social History   Substance Use Topics     Smoking status: Current Every Day Smoker     Packs/day: 0.50     Years: 7.00     Types: Cigarettes     Smokeless tobacco: Current User     Alcohol use Yes      Comment: daily, few drinks a day   Moved to [place] 2 years ago, was in [facility] helping to care for 's sick mother which was stressful for patient. Less stressed recently. Stay at home mom to 2 yr old son.     Family History   I have reviewed this patient's family history and updated it with pertinent information if needed.   Family History   Problem Relation Age of Onset     Cancer Mother 62     bladder cancer     Hypertension Mother      Psoriasis Father      No Known Problems Brother      No Known Problems Sister      No Known Problems Son      Diabetes No family hx of      Hyperlipidemia No family hx of      Breast Cancer No family hx of      Colon Cancer No family hx of    No hx of autoimmune disorder, no hx of heme/bleeding disorder    Prior to Admission Medications   Prior to Admission Medications   Prescriptions Last Dose Informant Patient Reported? Taking?   diphenhydrAMINE-acetaminophen (TYLENOL PM)  MG tablet   Yes No   Sig: Take 2 tablets by mouth nightly as needed for sleep      Facility-Administered Medications: None     Allergies   Allergies   Allergen Reactions     Spinach      Mouth gets numb       Physical Exam   Vital Signs: Temp: 99.5  F (37.5  C) Temp src: Oral BP: 106/76 Pulse: 102 Heart Rate: 110 Resp: 16 SpO2: 97 % O2 Device: None (Room air)    Weight: 180 lbs 8 oz    General Appearance: lying in bed comfortably, fair skinned  Eyes: No scleral icterus, pale conjunctivae  HEENT: atramatic MMM  Respiratory: breathing comfortably on room air, CTAB  Cardiovascular: Tachy, no murmur, mild edema  GI: Non distended non  tender  Lymph/Hematologic: No bruising, No LAD  Genitourinary: No jones  Skin: No gavino, no petichea  Musculoskeletal: normal bulk and tone  Neurologic: alert and oriented, nonfocal  Psychiatric: pleasant, appropriate affect and behavior    Assessment & Plan   Karla Gordon is a 28 year old female admitted on 11/16/2018. She has a history of HELLP and is admitted for anemia.    # Macrocytic anemia-possibly hemolytic  #Neutrophilic leukocytosis  Elevated LDH 1133, Bilirubin elevated, but only mildly 2.0.  smear prelim read suggestive of hemolysis. Vit B12 elevated 1374 less likely nutritional, reticulocyte % elevated 3.6, but maybe not appropriately compensated. Elevated alk phos 185. INR 1.3. Ddx: TTP, HUS, DIC, autoimmune, ?RBC membrane/metabolic abnormalities, thrombotic angiopathies given elevated inflammatory markers. ?Myelopoiesis disorder.  -bili direct and indirect  -haptoglobin  -uric acid  -[id number] ab eval for TTP  -fibrinogen  -SHAMIR  -folate  -Hcg urine  -leukemia flow  -MMA, folic acid,   -hiv  -Heme/Onc consult    #Leukocytosis  Likely stress response. Less likely     #Troponinemia  Likely myocardial ischemia in the setting of profound anemia. Trend.  -Echo WNL  -EKG      Diet:   Regular  Fluids: None  Lines: PIV  Jones Catheter: not present    DVT Prophylaxis: Low Risk/Ambulatory with no VTE prophylaxis indicated  Code Status: No Order  Expected discharge: 2 - 3 days, recommended to prior living arrangement once hemoglobin stable.     The patient was discussed with MD Shireen Bonilla 2  [facility]   Pager: [phone]  Please see sticky note for cross cover information      Data     Recent Labs  Lab 11/16/18  1246 11/16/18  1003   WBC 18.1* 18.7*   HGB 4.8* 4.6*   * 127*   * 127*   INR 1.30*  --      --    POTASSIUM 3.4  --    CHLORIDE 97  --    CO2 27  --    BUN 4*  --    CR 0.45*  --    ANIONGAP 10  --    SUMANTH 6.4*  --    GLC 98  --    ALBUMIN  2.9*  --    PROTTOTAL 6.4*  --    BILITOTAL 2.0*  --    ALKPHOS 185*  --    ALT 39  --    *  --    TROPI 0.107*  --

## 2018-11-17 ENCOUNTER — APPOINTMENT (OUTPATIENT)
Dept: ULTRASOUND IMAGING | Facility: CLINIC | Age: 28
End: 2018-11-17
Attending: STUDENT IN AN ORGANIZED HEALTH CARE EDUCATION/TRAINING PROGRAM
Payer: COMMERCIAL

## 2018-11-17 LAB
ALBUMIN SERPL-MCNC: 2.2 G/DL (ref 3.4–5)
ALBUMIN UR-MCNC: 30 MG/DL
ALP SERPL-CCNC: 153 U/L (ref 40–150)
ALT SERPL W P-5'-P-CCNC: 30 U/L (ref 0–50)
ANION GAP SERPL CALCULATED.3IONS-SCNC: 10 MMOL/L (ref 3–14)
ANION GAP SERPL CALCULATED.3IONS-SCNC: 11 MMOL/L (ref 3–14)
ANION GAP SERPL CALCULATED.3IONS-SCNC: 7 MMOL/L (ref 3–14)
APPEARANCE UR: ABNORMAL
AST SERPL W P-5'-P-CCNC: 102 U/L (ref 0–45)
BILIRUB SERPL-MCNC: 1.7 MG/DL (ref 0.2–1.3)
BILIRUB UR QL STRIP: ABNORMAL
BUN SERPL-MCNC: 4 MG/DL (ref 7–30)
CALCIUM SERPL-MCNC: 6.4 MG/DL (ref 8.5–10.1)
CALCIUM SERPL-MCNC: 6.4 MG/DL (ref 8.5–10.1)
CALCIUM SERPL-MCNC: 6.5 MG/DL (ref 8.5–10.1)
CALCIUM SERPL-MCNC: 6.9 MG/DL (ref 8.5–10.1)
CHLORIDE SERPL-SCNC: 103 MMOL/L (ref 94–109)
CHLORIDE SERPL-SCNC: 105 MMOL/L (ref 94–109)
CHLORIDE SERPL-SCNC: 105 MMOL/L (ref 94–109)
CO2 SERPL-SCNC: 24 MMOL/L (ref 20–32)
CO2 SERPL-SCNC: 26 MMOL/L (ref 20–32)
CO2 SERPL-SCNC: 28 MMOL/L (ref 20–32)
COLOR UR AUTO: ABNORMAL
CREAT SERPL-MCNC: 0.36 MG/DL (ref 0.52–1.04)
CREAT SERPL-MCNC: 0.39 MG/DL (ref 0.52–1.04)
CREAT SERPL-MCNC: 0.45 MG/DL (ref 0.52–1.04)
ERYTHROCYTE [DISTWIDTH] IN BLOOD BY AUTOMATED COUNT: 23.8 % (ref 10–15)
ERYTHROCYTE [DISTWIDTH] IN BLOOD BY AUTOMATED COUNT: 25 % (ref 10–15)
ERYTHROCYTE [DISTWIDTH] IN BLOOD BY AUTOMATED COUNT: 25.3 % (ref 10–15)
ERYTHROCYTE [DISTWIDTH] IN BLOOD BY AUTOMATED COUNT: 26.1 % (ref 10–15)
ERYTHROCYTE [DISTWIDTH] IN BLOOD BY AUTOMATED COUNT: 26.7 % (ref 10–15)
ERYTHROCYTE [DISTWIDTH] IN BLOOD BY AUTOMATED COUNT: 27.2 % (ref 10–15)
FERRITIN SERPL-MCNC: 474 NG/ML (ref 12–150)
FIBRINOGEN PPP-MCNC: 185 MG/DL (ref 200–420)
GFR SERPL CREATININE-BSD FRML MDRD: >90 ML/MIN/1.7M2
GLUCOSE SERPL-MCNC: 115 MG/DL (ref 70–99)
GLUCOSE SERPL-MCNC: 82 MG/DL (ref 70–99)
GLUCOSE SERPL-MCNC: 94 MG/DL (ref 70–99)
GLUCOSE UR STRIP-MCNC: NEGATIVE MG/DL
HCT VFR BLD AUTO: 24.6 % (ref 35–47)
HCT VFR BLD AUTO: 24.8 % (ref 35–47)
HCT VFR BLD AUTO: 25 % (ref 35–47)
HCT VFR BLD AUTO: 26.1 % (ref 35–47)
HCT VFR BLD AUTO: 26.2 % (ref 35–47)
HCT VFR BLD AUTO: 28.6 % (ref 35–47)
HGB BLD-MCNC: 8.1 G/DL (ref 11.7–15.7)
HGB BLD-MCNC: 8.2 G/DL (ref 11.7–15.7)
HGB BLD-MCNC: 8.2 G/DL (ref 11.7–15.7)
HGB BLD-MCNC: 8.3 G/DL (ref 11.7–15.7)
HGB BLD-MCNC: 8.7 G/DL (ref 11.7–15.7)
HGB BLD-MCNC: 9.2 G/DL (ref 11.7–15.7)
HGB UR QL STRIP: ABNORMAL
HYALINE CASTS #/AREA URNS LPF: 8 /LPF (ref 0–2)
INR PPP: 1.26 (ref 0.86–1.14)
KETONES UR STRIP-MCNC: NEGATIVE MG/DL
LEUKOCYTE ESTERASE UR QL STRIP: ABNORMAL
MAGNESIUM SERPL-MCNC: 2.4 MG/DL (ref 1.6–2.3)
MAGNESIUM SERPL-MCNC: 2.7 MG/DL (ref 1.6–2.3)
MCH RBC QN AUTO: 34.2 PG (ref 26.5–33)
MCH RBC QN AUTO: 34.5 PG (ref 26.5–33)
MCH RBC QN AUTO: 34.6 PG (ref 26.5–33)
MCH RBC QN AUTO: 34.9 PG (ref 26.5–33)
MCH RBC QN AUTO: 34.9 PG (ref 26.5–33)
MCH RBC QN AUTO: 35 PG (ref 26.5–33)
MCHC RBC AUTO-ENTMCNC: 31.4 G/DL (ref 31.5–36.5)
MCHC RBC AUTO-ENTMCNC: 32.2 G/DL (ref 31.5–36.5)
MCHC RBC AUTO-ENTMCNC: 32.7 G/DL (ref 31.5–36.5)
MCHC RBC AUTO-ENTMCNC: 33.2 G/DL (ref 31.5–36.5)
MCHC RBC AUTO-ENTMCNC: 33.2 G/DL (ref 31.5–36.5)
MCHC RBC AUTO-ENTMCNC: 33.3 G/DL (ref 31.5–36.5)
MCV RBC AUTO: 105 FL (ref 78–100)
MCV RBC AUTO: 105 FL (ref 78–100)
MCV RBC AUTO: 106 FL (ref 78–100)
MCV RBC AUTO: 106 FL (ref 78–100)
MCV RBC AUTO: 107 FL (ref 78–100)
MCV RBC AUTO: 109 FL (ref 78–100)
MUCOUS THREADS #/AREA URNS LPF: PRESENT /LPF
NITRATE UR QL: NEGATIVE
PH UR STRIP: 6.5 PH (ref 5–7)
PLATELET # BLD AUTO: 100 10E9/L (ref 150–450)
PLATELET # BLD AUTO: 101 10E9/L (ref 150–450)
PLATELET # BLD AUTO: 94 10E9/L (ref 150–450)
PLATELET # BLD AUTO: 95 10E9/L (ref 150–450)
PLATELET # BLD AUTO: 98 10E9/L (ref 150–450)
PLATELET # BLD AUTO: 99 10E9/L (ref 150–450)
POTASSIUM SERPL-SCNC: 3.4 MMOL/L (ref 3.4–5.3)
POTASSIUM SERPL-SCNC: 3.5 MMOL/L (ref 3.4–5.3)
PROT SERPL-MCNC: 5.3 G/DL (ref 6.8–8.8)
RBC # BLD AUTO: 2.34 10E12/L (ref 3.8–5.2)
RBC # BLD AUTO: 2.35 10E12/L (ref 3.8–5.2)
RBC # BLD AUTO: 2.37 10E12/L (ref 3.8–5.2)
RBC # BLD AUTO: 2.4 10E12/L (ref 3.8–5.2)
RBC # BLD AUTO: 2.49 10E12/L (ref 3.8–5.2)
RBC # BLD AUTO: 2.67 10E12/L (ref 3.8–5.2)
RBC #/AREA URNS AUTO: 11 /HPF (ref 0–2)
SODIUM SERPL-SCNC: 139 MMOL/L (ref 133–144)
SODIUM SERPL-SCNC: 140 MMOL/L (ref 133–144)
SODIUM SERPL-SCNC: 140 MMOL/L (ref 133–144)
SOURCE: ABNORMAL
SP GR UR STRIP: 1.02 (ref 1–1.03)
SQUAMOUS #/AREA URNS AUTO: 1 /HPF (ref 0–1)
TROPONIN I SERPL-MCNC: 0.12 UG/L (ref 0–0.04)
TROPONIN I SERPL-MCNC: 0.13 UG/L (ref 0–0.04)
URATE SERPL-MCNC: 6.4 MG/DL (ref 2.6–6)
UROBILINOGEN UR STRIP-MCNC: 4 MG/DL (ref 0–2)
WBC # BLD AUTO: 11.3 10E9/L (ref 4–11)
WBC # BLD AUTO: 13 10E9/L (ref 4–11)
WBC # BLD AUTO: 13.6 10E9/L (ref 4–11)
WBC # BLD AUTO: 13.9 10E9/L (ref 4–11)
WBC # BLD AUTO: 14.2 10E9/L (ref 4–11)
WBC # BLD AUTO: 9.8 10E9/L (ref 4–11)
WBC #/AREA URNS AUTO: 19 /HPF (ref 0–5)

## 2018-11-17 PROCEDURE — 93005 ELECTROCARDIOGRAM TRACING: CPT

## 2018-11-17 PROCEDURE — 85027 COMPLETE CBC AUTOMATED: CPT | Performed by: INTERNAL MEDICINE

## 2018-11-17 PROCEDURE — 83735 ASSAY OF MAGNESIUM: CPT

## 2018-11-17 PROCEDURE — 36415 COLL VENOUS BLD VENIPUNCTURE: CPT | Performed by: INTERNAL MEDICINE

## 2018-11-17 PROCEDURE — 86803 HEPATITIS C AB TEST: CPT | Performed by: STUDENT IN AN ORGANIZED HEALTH CARE EDUCATION/TRAINING PROGRAM

## 2018-11-17 PROCEDURE — 36415 COLL VENOUS BLD VENIPUNCTURE: CPT | Performed by: STUDENT IN AN ORGANIZED HEALTH CARE EDUCATION/TRAINING PROGRAM

## 2018-11-17 PROCEDURE — 82728 ASSAY OF FERRITIN: CPT | Performed by: INTERNAL MEDICINE

## 2018-11-17 PROCEDURE — 25000128 H RX IP 250 OP 636: Performed by: INTERNAL MEDICINE

## 2018-11-17 PROCEDURE — 40000275 ZZH STATISTIC RCP TIME EA 10 MIN

## 2018-11-17 PROCEDURE — 85610 PROTHROMBIN TIME: CPT | Performed by: INTERNAL MEDICINE

## 2018-11-17 PROCEDURE — 25000132 ZZH RX MED GY IP 250 OP 250 PS 637: Performed by: STUDENT IN AN ORGANIZED HEALTH CARE EDUCATION/TRAINING PROGRAM

## 2018-11-17 PROCEDURE — 84550 ASSAY OF BLOOD/URIC ACID: CPT | Performed by: INTERNAL MEDICINE

## 2018-11-17 PROCEDURE — 83735 ASSAY OF MAGNESIUM: CPT | Performed by: INTERNAL MEDICINE

## 2018-11-17 PROCEDURE — 82310 ASSAY OF CALCIUM: CPT | Performed by: INTERNAL MEDICINE

## 2018-11-17 PROCEDURE — 87086 URINE CULTURE/COLONY COUNT: CPT | Performed by: INTERNAL MEDICINE

## 2018-11-17 PROCEDURE — 81001 URINALYSIS AUTO W/SCOPE: CPT | Performed by: INTERNAL MEDICINE

## 2018-11-17 PROCEDURE — 86706 HEP B SURFACE ANTIBODY: CPT | Performed by: STUDENT IN AN ORGANIZED HEALTH CARE EDUCATION/TRAINING PROGRAM

## 2018-11-17 PROCEDURE — 76700 US EXAM ABDOM COMPLETE: CPT

## 2018-11-17 PROCEDURE — 86747 PARVOVIRUS ANTIBODY: CPT | Performed by: STUDENT IN AN ORGANIZED HEALTH CARE EDUCATION/TRAINING PROGRAM

## 2018-11-17 PROCEDURE — G0499 HEPB SCREEN HIGH RISK INDIV: HCPCS | Performed by: STUDENT IN AN ORGANIZED HEALTH CARE EDUCATION/TRAINING PROGRAM

## 2018-11-17 PROCEDURE — 99233 SBSQ HOSP IP/OBS HIGH 50: CPT | Mod: GC | Performed by: INTERNAL MEDICINE

## 2018-11-17 PROCEDURE — 85384 FIBRINOGEN ACTIVITY: CPT | Performed by: INTERNAL MEDICINE

## 2018-11-17 PROCEDURE — 87798 DETECT AGENT NOS DNA AMP: CPT | Performed by: STUDENT IN AN ORGANIZED HEALTH CARE EDUCATION/TRAINING PROGRAM

## 2018-11-17 PROCEDURE — 86645 CMV ANTIBODY IGM: CPT | Performed by: STUDENT IN AN ORGANIZED HEALTH CARE EDUCATION/TRAINING PROGRAM

## 2018-11-17 PROCEDURE — 25000132 ZZH RX MED GY IP 250 OP 250 PS 637: Performed by: INTERNAL MEDICINE

## 2018-11-17 PROCEDURE — 84484 ASSAY OF TROPONIN QUANT: CPT

## 2018-11-17 PROCEDURE — 80048 BASIC METABOLIC PNL TOTAL CA: CPT

## 2018-11-17 PROCEDURE — 84484 ASSAY OF TROPONIN QUANT: CPT | Performed by: INTERNAL MEDICINE

## 2018-11-17 PROCEDURE — 87389 HIV-1 AG W/HIV-1&-2 AB AG IA: CPT | Performed by: STUDENT IN AN ORGANIZED HEALTH CARE EDUCATION/TRAINING PROGRAM

## 2018-11-17 PROCEDURE — 86704 HEP B CORE ANTIBODY TOTAL: CPT | Performed by: STUDENT IN AN ORGANIZED HEALTH CARE EDUCATION/TRAINING PROGRAM

## 2018-11-17 PROCEDURE — 25000125 ZZHC RX 250: Performed by: INTERNAL MEDICINE

## 2018-11-17 PROCEDURE — 86665 EPSTEIN-BARR CAPSID VCA: CPT | Performed by: STUDENT IN AN ORGANIZED HEALTH CARE EDUCATION/TRAINING PROGRAM

## 2018-11-17 PROCEDURE — 86644 CMV ANTIBODY: CPT | Performed by: STUDENT IN AN ORGANIZED HEALTH CARE EDUCATION/TRAINING PROGRAM

## 2018-11-17 PROCEDURE — 12000008 ZZH R&B INTERMEDIATE UMMC

## 2018-11-17 PROCEDURE — 80053 COMPREHEN METABOLIC PANEL: CPT | Performed by: INTERNAL MEDICINE

## 2018-11-17 PROCEDURE — 87088 URINE BACTERIA CULTURE: CPT | Performed by: INTERNAL MEDICINE

## 2018-11-17 PROCEDURE — 80048 BASIC METABOLIC PNL TOTAL CA: CPT | Performed by: INTERNAL MEDICINE

## 2018-11-17 RX ORDER — LANOLIN ALCOHOL/MO/W.PET/CERES
100 CREAM (GRAM) TOPICAL DAILY
Status: DISCONTINUED | OUTPATIENT
Start: 2018-11-17 | End: 2018-11-18 | Stop reason: HOSPADM

## 2018-11-17 RX ORDER — FOLIC ACID 1 MG/1
5 TABLET ORAL DAILY
Status: DISCONTINUED | OUTPATIENT
Start: 2018-11-17 | End: 2018-11-18 | Stop reason: HOSPADM

## 2018-11-17 RX ADMIN — CALCIUM GLUCONATE 1 G: 98 INJECTION, SOLUTION INTRAVENOUS at 12:30

## 2018-11-17 RX ADMIN — FOLIC ACID: 5 INJECTION, SOLUTION INTRAMUSCULAR; INTRAVENOUS; SUBCUTANEOUS at 12:29

## 2018-11-17 RX ADMIN — VITAMIN B12 0.1 MG ORAL TABLET 100 MCG: 0.1 TABLET ORAL at 15:02

## 2018-11-17 RX ADMIN — THIAMINE HCL TAB 100 MG 100 MG: 100 TAB at 16:55

## 2018-11-17 RX ADMIN — FOLIC ACID 5 MG: 1 TABLET ORAL at 15:02

## 2018-11-17 ASSESSMENT — ACTIVITIES OF DAILY LIVING (ADL)
ADLS_ACUITY_SCORE: 11

## 2018-11-17 NOTE — PROGRESS NOTES
Admission          11/16/2018 12:22 PM  -----------------------------------------------------------  Reason for admission: hgb of 4.9, dizziness  Primary team notified of pt arrival.  Admitted from: ED  Via: stretcher  Accompanied by: family  Belongings: Placed in closet; valuables sent home with family  Admission Profile: complete  Teaching: orientation to unit and call light- call light within reach, call don't fall, use of console, meal times, when to call for the RN, and enforced importance of safety   Access:  PIV  Telemetry:Placed on pt  Ht./Wt.: complete  2 RN Skin Assessment Completed By: Jeremy, skin intact  Pt status:    Temp:  [98.8  F (37.1  C)-99.5  F (37.5  C)] 99  F (37.2  C)  Pulse:  [102-120] 102  Heart Rate:  [] 104  Resp:  [8-24] 18  BP: ()/(36-88) 103/69  SpO2:  [83 %-100 %] 97 %

## 2018-11-17 NOTE — PROGRESS NOTES
[facility], [place]   Hematology/Oncology Consult Progress Note    Karla Gordon MRN# [id number]   Age: 28 year old YOB: 1990          Reason for Consult:   Anemia and thrombocytopenia         Assessment and Plan:   Karla Gordon is a 28 year old w/PMHx of mood disorder and tobacco use disorder who presented to the ED on 11/16/18 due to symptomatic anemia (4.6) and thrombocytopenia (82) with no overt signs of bleeding, initially noted at an Urgent Care Center the day prior.     Initial studies are significant for a macrocytic anemia, thrombocytopenia, and leukocytosis, concerning for abnormal nutritional status of patient.  However vitamin B-12 is elevated.  MMA and folic acid pending.  Reticulocyte count is elevated. Patient does have elevated INR at 1.3, in addition to hypoalbuminemia at 2.9, PTT is normal. Preliminary review of blood smear by team was non-indicative of hemolysis, but did show macrothrombocytes w/o clumping and hypersegmented neutrophils. Elevated LDH is most likely secondary to intramedullary hemolysis due to vitamin deficiency.  With heavy EtOH use and low albumin, may also be a component of splenomegaly with platelet sequestration.  Considerations include malignant process, thus leukemia/lymphoma flow cytometry was ordered. Further testing will be based on results of pending studies.    Problem List  # Macrocytic Anemia  # Thrombocytopenia  # Leukocytosis  # folate deficiency  # EtOH abuse    Summary of Recommendations:    f/u lab studies MMA, folic acid, uric acid, leukemia/lymphoma flow cytometry, final peripheral smear read    Additional viral workup added for 11/17 (ordered)    Please get abdominal US to eval for hepatic and spleen eval (ordered)    Daily thiamine for chronic EtOH use     vitamin/electrolyte premix (banana bag)    recommend GI consult for consideration of occult bleeding workup (upper endoscopy and  "colonoscopy)    Substance abuse counseling    Thank you for involving us in the care of this patient. We will continue to follow during the hospitalization.    Patient seen and plan of care discussed with Dr. Osborn.    Holger Salcido MD, PhD  Hematology/Oncology Fellow  Pager: [phone]  12:10 PM  November 17, 2018         Interval History:    No acute events overnight.  Feeling better overall after transfusion.  Denies fevers, chills, nausea, vomiting, SOB.  Eating and drinking OK.  Denies other complaints at this time.          Review of Systems:   A 14 point ROS was performed with the patient and was found to be negative with the exception of that noted in the HPI above.       History of Present Illness:   History obtained from chart review and confirmed with patient.    Karla Gordon is a 28 year old w/PMHx of mood disorder and tobacco use disorder who presented to the ED on 11/16/18 due to anemia (4.6) and thrombocytopenia (82) noted at an Urgent Care Center the day prior.   The patient states that over the past 4-5 days, she has been feeling unwell in that she noted facial fullness and dizziness upon standing. As the week progressed, she had exertional dyspnea with minimal exertion. Patient states that over the past few weeks she has had multiple changes to her anxiety medications due to nausea and vomiting. She endorses abdominal bloating. Two days ago, she noted swelling in the bilateral lower extremities.Denies hematemesis, melena, bruising, adenopathy, abdominal pain, chest pain.   Patient states that she was once told that she was a \"bleeder\" after her shoulder surgery, but was unsure what that meant.  She was once told that she had anemia, also once told that she had a low platelet count.  She denies heavy menstrual periods.  She states that she does not know when her last menstrual occurred.  It has been at least 2 months and she has taken pregnancy tests that have been negative.  Past pregnancy was " "not complicated by heavy bleeding.  Denies use of NSAIDs, aspirin, herbal medications.  Patient endorses daily alcohol use. Will have up to 3-4 shots at a time, but occasionally more. Smokes 0.5 cigarettes per day. Denies recreational blood use.   No family history of bleeding or clotting disorder.       Review of Systems:   A comprehensive ROS was performed with the patient and was found to be negative with the exception of that noted in the HPI above.       Past Medical History:            Allergies:     Allergies   Allergen Reactions     Spinach      Mouth gets numb     Lexapro [Escitalopram] Nausea     Severe nausea; does not wish to take again     Sertraline Nausea     Severe nausea; does not wish to take again            Medications:     Prescriptions Prior to Admission   Medication Sig Dispense Refill Last Dose     diphenhydrAMINE-acetaminophen (TYLENOL PM)  MG tablet Take 2 tablets by mouth nightly as needed for sleep   11/15/2018 at pm            Physical Exam:   /74 (BP Location: Right arm)  Pulse 84  Temp 98.4  F (36.9  C) (Oral)  Resp 16  Ht 1.676 m (5' 6\")  Wt 81.7 kg (180 lb 1.9 oz)  SpO2 96%  BMI 29.07 kg/m2  Vitals:    11/16/18 1216 11/16/18 2132 11/17/18 0124   Weight: 81.9 kg (180 lb 8 oz) 81.7 kg (180 lb 1.9 oz) 81.7 kg (180 lb 1.9 oz)     General:  lying in bed, in no acute distress.  Heme/Lymph: No overt bleeding.   Skin: No concerning pallor, lesions, rash, cyanosis, erythema, or ecchymoses on exposed surfaces.  HEENT: NCAT. anicteric sclera. Neck nontender, supple.  Respiratory: Non-labored breathing, good air exchange, lungs clear to auscultation bilaterally.  Cardiovascular: Tachycardia, regular rhythm, no murmur or rub.   Gastrointestinal: Normoactive bowel sounds. Abdomen soft, non-distended, and non-tender. No palpable masses or organomegaly.  Extremities: Bilateral LE WWP, 3+ pitting edema  Neurologic: A&O x 3, speech and mentation normal         Data:   I have " personally reviewed the following labs/imaging:  CBC    Recent Labs  Lab 11/17/18  0950 11/17/18  0545 11/17/18  0534 11/17/18  0008   WBC 13.0* 13.9* 13.6* 14.2*   RBC 2.49* 2.34* 2.35* 2.37*   HGB 8.7* 8.1* 8.2* 8.3*   HCT 26.2* 24.8* 24.6* 25.0*   * 106* 105* 106*   MCH 34.9* 34.6* 34.9* 35.0*   MCHC 33.2 32.7 33.3 33.2   RDW 26.1* 25.0* 25.3* 23.8*   * 99* 95* 94*     CMP    Recent Labs  Lab 11/17/18  0950 11/17/18  0545 11/17/18  0008 11/16/18  1855 11/16/18  1246 11/16/18  1003   NA  --  140 139 138 134  --    POTASSIUM  --  3.5 3.5  3.5 2.9* 3.4  --    CHLORIDE  --  105 103 102 97  --    CO2  --  24 26 30 27  --    ANIONGAP  --  11 10 6 10  --    GLC  --  82 115* 98 98  --    BUN  --  4* 4* 4* 4*  --    CR  --  0.39* 0.45* 0.44* 0.45*  --    GFRESTIMATED  --  >90 >90 >90 >90  --    GFRESTBLACK  --  >90 >90 >90 >90  --    SUMANTH  --  6.4* 6.4*  6.5* 6.0* 6.4*  --    MAG 2.4*  --  2.7* 1.0*  --   --    PROTTOTAL  --  5.3*  --   --  6.4* 6.3*   ALBUMIN  --  2.2*  --   --  2.9* 2.8*   BILITOTAL  --  1.7*  --   --  2.0* 1.8*   ALKPHOS  --  153*  --   --  185* 164*   AST  --  102*  --   --  118* 126*   ALT  --  30  --   --  39 37     INR    Recent Labs  Lab 11/17/18  0545 11/16/18  1246   INR 1.26* 1.30*     Folate 3.9  B12>1000

## 2018-11-17 NOTE — PLAN OF CARE
Problem: Patient Care Overview  Goal: Plan of Care/Patient Progress Review  Outcome: Improving  Pt is alert and oriented. Up ad [place]. Placed on droplet isolation for parvo virus r/o. Eating well. Denies pain. Denies dysuria. BM today. Skin intact. VSS.

## 2018-11-17 NOTE — PROGRESS NOTES
"[facility], [place]    Internal Medicine Progress Note - [place] 2 Service    Main Plans for Today   q6h cbc  Bmp/lytes recheck  troponin recheck  Pending Heme labs    Assessment & Plan   Karla Gordon is a 28 year old female admitted on 11/16/2018. She has a history of HELLP and is admitted for anemia.     # Macrocytic anemia-possibly hemolytic  #Neutrophilic leukocytosis  No obvious signs of bleeding initially, abnormal appearance of urine, possibly hematuria. Elevated LDH 1133, Bilirubin elevated, but only mildly 2.0.  smear prelim read suggestive of hemolysis. Vit B12 elevated 1374, Low folate however MMA pending, reticulocyte % elevated 3.6, but maybe not appropriately compensated. Elevated alk phos 185. INR 1.3. Ddx: nutritional anemia, EtOH bone marrow suppression, TTP, HUS, DIC, autoimmune, ?RBC membrane/metabolic abnormalities, thrombotic angiopathies given elevated inflammatory markers. ?Myelopoiesis disorder.  -Heme/Onc consult  -Banana Bag  -UA    Pending Labs:   -bili direct and indirect  -haptoglobin  -[id number] ab eval for TTP  -SHAMIR  -leukemia flow  -MMA  -HIV    #Hypocalcemia  #Hypokalemia  #hypomagnesiumemia  -Ca glu 2g  -Potassium repletion  -Mg repletion       #Leukocytosis  Likely stress response. Less likely infection. Trending down with correction of anemia.     #Type 2 MI  Troponin trending down. Likely myocardial ischemia in the setting of profound anemia. Trend.  -Echo WNL  -EKG    #Alcohol use disorder  Uses 2-4 cocktails a day, more if they have a . Patient is cutting down together with . Not interested in treatment or support from meds. Pt is aware of liver injury consistent with alcohol overuse and that acohol may be contributing to anemia. Pt states, I can quit and I will, \"I dont want to end up back here\".    Diet: Combination Diet Regular Diet Adult  Fluids: Banana Bag  Lines: PIV  Mchugh Catheter: not present    DVT Prophylaxis: " Pneumatic Compression Devices and Ambulate every shift  Code Status: Full Code  Expected discharge: 2 - 3 days, recommended to prior living arrangement once hemoglobin stable.Work up complete.     The patient's care was discussed with the Attending Physician, Dr. Marie.    Ana Laura on  [facility] 2  Pager: [phone]  Please see sticky note for cross cover information    Interval History   Patient feels much better after receiving blood. No longer dizzy, dyspneic. No chest pain. No melena, no bloody bm, no hemoptysis. No blood in vomit. Has dark opaque episode of urine, UA sent.     Physical Exam   Vital Signs: Temp: 98.4  F (36.9  C) Temp src: Oral BP: 97/64 Pulse: 82 Heart Rate: 85 Resp: 16 SpO2: 94 % O2 Device: None (Room air)    Weight: 180 lbs 1.85 oz  General Appearance: Sitting in bed comfortably  Respiratory: breathing well on RA  Cardiovascular: RRR no murmur  GI: Nondistended nontender  Skin: No rash or lesion, no petichiea, no ecchymosis  Other: Trace nonpitting edema in BLE.      Data     Recent Labs  Lab 11/17/18  0950 11/17/18  0545 11/17/18  0534 11/17/18  0008 11/16/18  2031 11/16/18  1855 11/16/18  1246   WBC 13.0* 13.9* 13.6* 14.2*  --  13.8* 18.1*   HGB 8.7* 8.1* 8.2* 8.3*  --  6.9* 4.8*   * 106* 105* 106*  --  109* 127*   * 99* 95* 94*  --  95* 126*   INR  --  1.26*  --   --   --   --  1.30*   NA  --  140  --  139  --  138 134   POTASSIUM  --  3.5  --  3.5  3.5  --  2.9* 3.4   CHLORIDE  --  105  --  103  --  102 97   CO2  --  24  --  26  --  30 27   BUN  --  4*  --  4*  --  4* 4*   CR  --  0.39*  --  0.45*  --  0.44* 0.45*   ANIONGAP  --  11  --  10  --  6 10   SUMANTH  --  6.4*  --  6.4*  6.5*  --  6.0* 6.4*   GLC  --  82  --  115*  --  98 98   ALBUMIN  --  2.2*  --   --   --   --  2.9*   PROTTOTAL  --  5.3*  --   --   --   --  6.4*   BILITOTAL  --  1.7*  --   --   --   --  2.0*   ALKPHOS  --  153*  --   --   --   --  185*   ALT  --  30  --   --   --   --  39    AST  --  102*  --   --   --   --  118*   TROPI 0.121*  --   --  0.129* 0.123*  --  0.107*       Recent Results (from the past 24 hour(s))   POC US ECHO LIMITED    Impression    Limited Bedside Cardiac Ultrasound, performed and interpreted by me.   Indication: Chest Pain.  Parasternal long axis, parasternal short axis, apical 4 chamber and subcostal views were acquired.   Image quality was satisfactory.    Findings:    Global left ventricular function appears intact.  Chambers do not appear dilated.  There is no evidence of free fluid within the pericardium.    IMPRESSION: Grossly normal left ventricular function and chamber size.  No pericardial effusion..

## 2018-11-17 NOTE — PLAN OF CARE
Problem: Patient Care Overview  Goal: Plan of Care/Patient Progress Review  Outcome: Improving  A/O x4. Able to make needs known, uses call light appropriately. HR 80's, VSS; Sbp low 100's. afebrile; Lung sounds clear, no c/o chest pain. On RA, satting 95% and above. adequate urine output. 3 units of PRBC's given, hgb re-check back at 8.3. K+, mag  And potassium replaced. Tessalon sheila ordered TID for dry cough.  Will continue to monitor and follow plan of care.

## 2018-11-17 NOTE — PROVIDER NOTIFICATION
-------------------CRITICAL LAB VALUE-------------------    Lab Value: Troponin 0.123  Time of notification: 9:59 PM  MD notified: yes  Patient status:  Temp:  [98.8  F (37.1  C)-99.5  F (37.5  C)] 99  F (37.2  C)  Pulse:  [102-120] 102  Heart Rate:  [] 104  Resp:  [8-24] 18  BP: ()/(36-88) 103/69  SpO2:  [83 %-100 %] 97 %  Orders received: will be trending troponin Q4H  Will cont to monitor.

## 2018-11-17 NOTE — ED NOTES
[facility], [place]   ED Nurse to Floor Handoff     Karla Gordon is a 28 year old female who speaks English and lives with a spouse,  in a home  They arrived in the ED by car from clinic    ED Chief Complaint: Abnormal Labs    ED Dx;   Final diagnoses:   Anemia, unspecified type         Needed?: No    Allergies:   Allergies   Allergen Reactions     Spinach      Mouth gets numb     Lexapro [Escitalopram] Nausea     Severe nausea; does not wish to take again     Sertraline Nausea     Severe nausea; does not wish to take again   .  Past Medical Hx:   Past Medical History:   Diagnosis Date     Tobacco use disorder 3/21/2018      Baseline Mental status: WDL  Current Mental Status changes: at basesline    Infection present or suspected this encounter: cultures pending  Sepsis suspected: No  Isolation type: No active isolations     Activity level - Baseline/Home:  Independent  Activity Level - Current:   Independent    Bariatric equipment needed?: No    In the ED these meds were given:   Medications   naloxone (NARCAN) injection 0.1-0.4 mg (not administered)   melatonin tablet 1 mg (not administered)   0.9% sodium chloride BOLUS (0 mLs Intravenous Stopped 11/16/18 1422)       Drips running?  No    Home pump  No    Current LDAs  Peripheral IV 11/16/18 Left Lower forearm (Active)   Site Assessment WDL 11/16/2018 12:47 PM   Line Status Saline locked 11/16/2018 12:47 PM   Number of days:0       Labs results:   Labs Ordered and Resulted from Time of ED Arrival Up to the Time of Departure from the ED   CBC WITH PLATELETS DIFFERENTIAL - Abnormal; Notable for the following:        Result Value    WBC 18.1 (*)     RBC Count 1.13 (*)     Hemoglobin 4.8 (*)     Hematocrit 14.3 (*)      (*)     MCH 42.5 (*)     RDW 23.0 (*)     Platelet Count 126 (*)     Nucleated RBCs 1 (*)     Absolute Neutrophil 14.0 (*)     Absolute Metamyelocytes 0.3 (*)     All other components within normal  limits   INR - Abnormal; Notable for the following:     INR 1.30 (*)     All other components within normal limits   COMPREHENSIVE METABOLIC PANEL - Abnormal; Notable for the following:     Urea Nitrogen 4 (*)     Creatinine 0.45 (*)     Calcium 6.4 (*)     Bilirubin Total 2.0 (*)     Albumin 2.9 (*)     Protein Total 6.4 (*)     Alkaline Phosphatase 185 (*)      (*)     All other components within normal limits   LACTIC ACID WHOLE BLOOD - Abnormal; Notable for the following:     Lactic Acid 3.0 (*)     All other components within normal limits   CRP INFLAMMATION - Abnormal; Notable for the following:     CRP Inflammation 11.0 (*)     All other components within normal limits   ERYTHROCYTE SEDIMENTATION RATE AUTO - Abnormal; Notable for the following:     Sed Rate 102 (*)     All other components within normal limits   NT PROBNP INPATIENT - Abnormal; Notable for the following:     N-Terminal Pro BNP Inpatient 600 (*)     All other components within normal limits   TROPONIN I - Abnormal; Notable for the following:     Troponin I ES 0.107 (*)     All other components within normal limits   RETICULOCYTE COUNT - Abnormal; Notable for the following:     % Retic 3.6 (*)     All other components within normal limits   IRON AND IRON BINDING CAPACITY - Abnormal; Notable for the following:     Iron Binding Cap 222 (*)     All other components within normal limits   VITAMIN B12 - Abnormal; Notable for the following:     Vitamin B12 1374 (*)     All other components within normal limits   LACTATE DEHYDROGENASE - Abnormal; Notable for the following:     Lactate Dehydrogenase 1133 (*)     All other components within normal limits   FIBRINOGEN ACTIVITY - Abnormal; Notable for the following:     Fibrinogen 191 (*)     All other components within normal limits   URIC ACID - Abnormal; Notable for the following:     Uric Acid 6.7 (*)     All other components within normal limits   CBC WITH PLATELETS - Abnormal; Notable for the  following:     WBC 13.8 (*)     RBC Count 1.96 (*)     Hemoglobin 6.9 (*)     Hematocrit 21.3 (*)      (*)     MCH 35.2 (*)     RDW 23.7 (*)     Platelet Count 95 (*)     All other components within normal limits   FIBRINOGEN ACTIVITY - Abnormal; Notable for the following:     Fibrinogen 173 (*)     All other components within normal limits   BASIC METABOLIC PANEL - Abnormal; Notable for the following:     Potassium 2.9 (*)     Urea Nitrogen 4 (*)     Creatinine 0.44 (*)     Calcium 6.0 (*)     All other components within normal limits   URIC ACID - Abnormal; Notable for the following:     Uric Acid 6.8 (*)     All other components within normal limits   PARTIAL THROMBOPLASTIN TIME   ROUTINE UA WITH MICROSCOPIC   BLOOD MORPHOLOGY PATHOLOGIST REVIEW   [id number] ACT W REFLX   HAPTOGLOBIN   LEUKEMIA LYMPHOMA EVALUATION (FLOW CYTOMETRY)   METHYLMALONIC ACID   HIV ANTIGEN ANTIBODY COMBO   TROPONIN I   CARDIAC CONTINUOUS MONITORING   CIWA-AR SCALE AND VS   IP ASSIGN PROVIDER TEAM TO TREATMENT TEAM   VITAL SIGNS   NO INDWELLING URINARY CATHETER (BETH) PRESENT OR NEEDED   BLADDER SCAN   APPLY PNEUMATIC COMPRESSION DEVICE (PCD)   ABO/RH TYPE AND SCREEN   DIRECT ANTIGLOBULIN TEST   RED BLOOD CELL PREPARE ORDER UNIT   BLOOD COMPONENT   BLOOD COMPONENT   RED BLOOD CELL PREPARE ORDER UNIT   BLOOD COMPONENT       Imaging Studies:   Recent Results (from the past 24 hour(s))   POC US ECHO LIMITED    Impression    Limited Bedside Cardiac Ultrasound, performed and interpreted by me.   Indication: Chest Pain.  Parasternal long axis, parasternal short axis, apical 4 chamber and subcostal views were acquired.   Image quality was satisfactory.    Findings:    Global left ventricular function appears intact.  Chambers do not appear dilated.  There is no evidence of free fluid within the pericardium.    IMPRESSION: Grossly normal left ventricular function and chamber size.  No pericardial effusion..       Recent vital signs:   BP  "106/76  Pulse 102  Temp 99.5  F (37.5  C)  Resp 16  Ht 1.676 m (5' 6\")  Wt 81.9 kg (180 lb 8 oz)  SpO2 97%  BMI 29.13 kg/m2    Cardiac Rhythm: Normal Sinus  Pt needs tele? Yes  Skin/wound Issues: None    Code Status: Full Code    Pain control: good    Nausea control: pt had none    Abnormal labs/tests/findings requiring intervention: Hgb 6.9, K+ 2.9, Calcium 6, see EPIC, many labs out of range  Family present during ED course? Yes   Family Comments/Social Situation comments: none    Tasks needing completion: 3rd unit of blood, timed labs    Neyda Dallas, RN    [phone] [facility] ED      "

## 2018-11-17 NOTE — PROGRESS NOTES
Pt transferred from . Report received from Yao LOMAX. Pt denies pain. IV's infusing banana bag and calcium gluconate

## 2018-11-17 NOTE — PROGRESS NOTES
Transfer  Transferred to: 5A  Via: Wheelchair  Reason for transfer: Pt no longer appropriate for 6B- stable patient condition  Family: Pt to make family aware of transfer  Belongings: Packed and sent with pt  Chart: Delivered with pt to next unit  Medications: Meds sent to new unit with pt  Report given to: Jose LOMAX  Pt status: Vitally stable, alert and in agreement with plans for transfer.  ,

## 2018-11-18 ENCOUNTER — PATIENT OUTREACH (OUTPATIENT)
Dept: CARE COORDINATION | Facility: CLINIC | Age: 28
End: 2018-11-18

## 2018-11-18 VITALS
HEIGHT: 66 IN | RESPIRATION RATE: 16 BRPM | SYSTOLIC BLOOD PRESSURE: 99 MMHG | HEART RATE: 100 BPM | OXYGEN SATURATION: 97 % | DIASTOLIC BLOOD PRESSURE: 48 MMHG | WEIGHT: 180.12 LBS | TEMPERATURE: 98.7 F | BODY MASS INDEX: 28.95 KG/M2

## 2018-11-18 LAB
BACTERIA SPEC CULT: ABNORMAL
ERYTHROCYTE [DISTWIDTH] IN BLOOD BY AUTOMATED COUNT: 27.8 % (ref 10–15)
HAPTOGLOB SERPL-MCNC: <6 MG/DL (ref 15–200)
HCT VFR BLD AUTO: 26.3 % (ref 35–47)
HGB BLD-MCNC: 8.4 G/DL (ref 11.7–15.7)
Lab: ABNORMAL
MCH RBC QN AUTO: 35 PG (ref 26.5–33)
MCHC RBC AUTO-ENTMCNC: 31.9 G/DL (ref 31.5–36.5)
MCV RBC AUTO: 110 FL (ref 78–100)
METHYLMALONATE SERPL-SCNC: 0.2 UMOL/L (ref 0–0.4)
PLATELET # BLD AUTO: 84 10E9/L (ref 150–450)
RBC # BLD AUTO: 2.4 10E12/L (ref 3.8–5.2)
SPECIMEN SOURCE: ABNORMAL
TROPONIN I SERPL-MCNC: 0.09 UG/L (ref 0–0.04)
WBC # BLD AUTO: 8.9 10E9/L (ref 4–11)

## 2018-11-18 PROCEDURE — 90686 IIV4 VACC NO PRSV 0.5 ML IM: CPT | Performed by: INTERNAL MEDICINE

## 2018-11-18 PROCEDURE — 25000128 H RX IP 250 OP 636: Performed by: INTERNAL MEDICINE

## 2018-11-18 PROCEDURE — 25000132 ZZH RX MED GY IP 250 OP 250 PS 637: Performed by: STUDENT IN AN ORGANIZED HEALTH CARE EDUCATION/TRAINING PROGRAM

## 2018-11-18 PROCEDURE — 99239 HOSP IP/OBS DSCHRG MGMT >30: CPT | Mod: GC | Performed by: INTERNAL MEDICINE

## 2018-11-18 PROCEDURE — 84484 ASSAY OF TROPONIN QUANT: CPT

## 2018-11-18 PROCEDURE — 36415 COLL VENOUS BLD VENIPUNCTURE: CPT

## 2018-11-18 PROCEDURE — 85027 COMPLETE CBC AUTOMATED: CPT | Performed by: INTERNAL MEDICINE

## 2018-11-18 PROCEDURE — 25000132 ZZH RX MED GY IP 250 OP 250 PS 637: Performed by: INTERNAL MEDICINE

## 2018-11-18 RX ORDER — FOLIC ACID 1 MG/1
5 TABLET ORAL DAILY
Qty: 60 TABLET | Refills: 1 | Status: SHIPPED | OUTPATIENT
Start: 2018-11-19 | End: 2018-12-28

## 2018-11-18 RX ADMIN — THIAMINE HCL TAB 100 MG 100 MG: 100 TAB at 08:08

## 2018-11-18 RX ADMIN — CALCIUM GLUCONATE 1 G: 98 INJECTION, SOLUTION INTRAVENOUS at 08:09

## 2018-11-18 RX ADMIN — FOLIC ACID 5 MG: 1 TABLET ORAL at 08:07

## 2018-11-18 RX ADMIN — INFLUENZA A VIRUS A/MICHIGAN/45/2015 X-275 (H1N1) ANTIGEN (FORMALDEHYDE INACTIVATED), INFLUENZA A VIRUS A/SINGAPORE/INFIMH-16-0019/2016 IVR-186 (H3N2) ANTIGEN (FORMALDEHYDE INACTIVATED), INFLUENZA B VIRUS B/PHUKET/3073/2013 ANTIGEN (FORMALDEHYDE INACTIVATED), AND INFLUENZA B VIRUS B/MARYLAND/15/2016 BX-69A ANTIGEN (FORMALDEHYDE INACTIVATED) 0.5 ML: 15; 15; 15; 15 INJECTION, SUSPENSION INTRAMUSCULAR at 11:49

## 2018-11-18 RX ADMIN — VITAMIN B12 0.1 MG ORAL TABLET 100 MCG: 0.1 TABLET ORAL at 08:08

## 2018-11-18 ASSESSMENT — ACTIVITIES OF DAILY LIVING (ADL)
ADLS_ACUITY_SCORE: 11

## 2018-11-18 NOTE — PLAN OF CARE
"Problem: Patient Care Overview  Goal: Plan of Care/Patient Progress Review  Outcome: Improving  /66 (BP Location: Right arm)  Pulse 84  Temp 98.6  F (37  C) (Oral)  Resp 16  Ht 1.676 m (5' 6\")  Wt 81.7 kg (180 lb 1.9 oz)  SpO2 96%  BMI 29.07 kg/m2  A&Ox4. T max 99.7. OVSS on RA. Denies pain and nausea. Droplet isolation while r/o Parvo Virus. Last Hgb 9.2; monitoring CBC & Electrolytes. Up ad mariposa; SBA. Voiding spontaneously. BM today; no report of BM this evening. Continue to monitor and follow POC.            "

## 2018-11-18 NOTE — DISCHARGE SUMMARY
[facility], [place]    Internal Medicine Discharge Summary- Shireen Service    Date of Admission:  11/16/2018  Date of Discharge:  11/18/2018  Discharging Attending Provider: Cynthia  Discharge Team: Shireen 2    Discharge Diagnoses   Macrocytic anemia 2/2 severe folate deficiency  Alcohol use disorder  Steatosis  Thrombocytopenia  Anxiety/depression  Type 2 MI    Follow-ups Needed After Discharge   Follow up CBC, pending hemolysis labs at next PCP appt    Hospital Course   Karla Gordon was admitted on 11/16/2018. 28 year old female admitted on 11/16/2018. She has a history of anxiety, depression, alcohol use,  HELLP and is admitted for macrocytic anemia 2/2 severe folate deficiency likely due to alcohol use.The following problems were addressed during her hospitalization:      #Macrocytic anemia  #Thrombocytopneia  #Folate deficiency  Presented with Hgb of 4.6. 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 Likely 2/2 to heavy alcohol consumption. Pt received 3 U PRBC. Hgb responded appropriately, stable upon discharge at 8.4.  -Daily Folate  -Daily B12     Pending Labs:   -Parvovirus, Hepatitis panel, CMV, EBV  -bili direct and indirect  -haptoglobin  -Szeory72  -SHAMIR  -leukemia flow  -MMA  -HIV     #Hypocalcemia  #Hypokalemia  #hypomagnesiumemia  Likely 2/2 to poor nutrition and chronic alcohol abuse  -Ca glu 2g  -Potassium repletion  -Mg repletion     #Steatosis  #Transaminitis  #Alcohol use disorder  LFT elevated in alcohol use pattern. Steatosis appearance on abdominal ultrasound. Likely represents early alcoholic liver disease.  -EtOH cessation counseling, pt not interested in treatment at this time, aware that it is an option  -follow-up PCP, Patient may be interested in naloxone to treat cravings      #Type 2 MI  Troponin trending down. Likely myocardial ischemia in the  setting of profound anemia. Echo WNL.    #Anxiety, active, recurrent  #Depression, active, recurrent  Not on any medication at present. Is seeing outpatient provider.       Consultations This Hospital Stay   MEDICATION HISTORY IP PHARMACY CONSULT  HEMATOLOGY & ONCOLOGY IP CONSULT  VASCULAR ACCESS CARE ADULT IP CONSULT    Code Status   Full Code       The patient was discussed with Dr. Cynthia Lance MD  [facility]  Pager: [phone]  ______________________________________________________________________    Physical Exam   Vital Signs: Temp: 98.7  F (37.1  C) Temp src: Oral BP: 99/48 Pulse: 100 Heart Rate: 74 Resp: 16 SpO2: 97 % O2 Device: None (Room air)    Weight: 180 lbs 1.85 oz    General Appearance: Sitting in bed, NAD, well appearing female  Respiratory: Breathing comfortably on room air  Cardiovascular: RRR, No murmur  GI: non distended, non tender  Skin: fair skin, not pale, no rash or lesion  Other: alert and oriented, tearful affect, appropriate behavior and judgement    Significant Results and Procedures   Most Recent 3 CBC's:  Recent Labs   Lab Test  11/18/18   0615  11/17/18   2138  11/17/18   1353   WBC  8.9  9.8  11.3*   HGB  8.4*  8.2*  9.2*   MCV  110*  109*  107*   PLT  84*  100*  98*     Most Recent 2 LFT's:  Recent Labs   Lab Test  11/17/18   0545  11/16/18   1246   AST  102*  118*   ALT  30  39   ALKPHOS  153*  185*   BILITOTAL  1.7*  2.0*     Most Recent Anemia Panel:  Recent Labs   Lab Test  11/18/18   0615   11/17/18   0545   11/16/18   1246  11/16/18   1003   WBC  8.9   < >  13.9*   < >  18.1*  18.7*   HGB  8.4*   < >  8.1*   < >  4.8*  4.6*   HCT  26.3*   < >  24.8*   < >  14.3*  13.8*   MCV  110*   < >  106*   < >  127*  127*   PLT  84*   < >  99*   < >  126*  127*   IRON   --    --    --    --   40   --    IRONSAT   --    --    --    --   18   --    RETICABSCT   --    --    --    --   40.7   --    RETP   --    --    --    --   3.6*   --    FEB   --    --    --    --    222*   --    YAZMIN   --    --   474*   --    --    --    B12   --    --    --    --   1374*   --    FOLIC   --    --    --    --    --   3.9*    < > = values in this interval not displayed.   ,   Results for orders placed or performed during the hospital encounter of 11/16/18   POC US ECHO LIMITED    Impression    Limited Bedside Cardiac Ultrasound, performed and interpreted by me.   Indication: Chest Pain.  Parasternal long axis, parasternal short axis, apical 4 chamber and subcostal views were acquired.   Image quality was satisfactory.    Findings:    Global left ventricular function appears intact.  Chambers do not appear dilated.  There is no evidence of free fluid within the pericardium.    IMPRESSION: Grossly normal left ventricular function and chamber size.  No pericardial effusion..   US Abdomen Complete    Narrative    Ultrasound abdomen complete   11/17/2018 4:31 PM      HISTORY: Eval for hepatosplenomegaly    COMPARISON: None    FINDINGS: The liver is diffusely heterogeneous and echogenic. Small  amount of perihepatic fluid. The liver is enlarged measuring 21.6 cm.  The spleen is normal in size at 11.9. The gallbladder wall is  thickened, and is a mild amount of pericholecystic fluid versus edema  in the gallbladder wall; this may relate to adjacent hepatic  parenchymal disease. There are no gallstones. Common duct measures 4.  The aorta and IVC are normal. The right kidney measures 10.6. The left  kidney measures 11.4. There is no hydronephrosis. Normal caliber of  the upper abdominal aorta and IVC, measuring 1.7 cm each. No free  intraperitoneal fluid.      Impression    IMPRESSION:      1. Increased hepatic echogenicity as can be seen in intrinsic  parenchymal disease such as steatosis.  2. Liver is enlarged, measuring 21.6 cm.  3. Spleen is borderline enlarged, measuring 1.9 cm.    I have personally reviewed the examination and initial interpretation  and I agree with the findings.    POP COPE,  MD       Pending Results   These results will be followed up by PCP  Unresulted Labs Ordered in the Past 30 Days of this Admission     Date and Time Order Name Status Description    11/17/2018 1341 Hepatitis C Screen Reflex to HCV RNA Quant and Genotype In process     11/17/2018 1341 Hepatitis B surface antigen In process     11/17/2018 1341 Hepatitis B core antibody In process     11/17/2018 1341 Hepatitis B Surface Antibody In process     11/17/2018 1341 HIV Antigen Antibody Combo In process     11/17/2018 1341 Parvovirus B19 DNA PCR (Blood or Bone Marrow) In process     11/17/2018 1341 Parvovirus B19 Antibody IgM In process     11/17/2018 1341 Parvovirus B19 Antibody IgG In process     11/17/2018 1341 CMV antibody IgM In process     11/17/2018 1341 CMV Antibody IgG In process     11/17/2018 1341 EBV Capsid Antibody IgM In process     11/17/2018 1341 EBV Capsid Antibody IgG In process     11/17/2018 1033 Urine Culture Aerobic Bacterial Preliminary     11/16/2018 1614 Leukemia Lymphoma Evaluation (Flow Cytometry) In process     11/16/2018 1246 HIV Antigen Antibody Combo In process     11/16/2018 1246 Methylmalonic acid In process     11/16/2018 1246 Haptoglobin In process     11/16/2018 1246 [id number] Activity w Reflex to Inhib Ginger In process     11/16/2018 1236 Blood Morphology Pathologist Review In process              Primary Care Physician   [facility]    Discharge Disposition   Discharged to home  Condition at discharge: Stable    Discharge Orders     ONC/HEME ADULT REFERRAL     Reason for your hospital stay   You were hospitalized for severe anemia due to severe folate deficiency. This is a result of alcohol use. Additionally, while you were hospitalized there were findings on lab and imaging that show alcohol related liver damage.     Follow Up and recommended labs and tests   Follow up with primary care provider, [facility], within 7 days to evaluate medication change, for  hospital follow- up and to follow up on results.  The following labs/tests are recommended: hemoglobin, CBC, pending heme labs from inpatient stay.     Activity   Your activity upon discharge: activity as tolerated     Full Code     Diet   Follow this diet upon discharge: Orders Placed This Encounter     Combination Diet Regular Diet Adult       Discharge Medications   Current Discharge Medication List      START taking these medications    Details   cyanocobalamin (VITAMIN  B-12) 100 MCG TABS tablet Take 1 tablet (100 mcg) by mouth daily  Qty: 150 tablet, Refills: 0    Associated Diagnoses: Macrocytic anemia      folic acid (FOLVITE) 1 MG tablet Take 5 tablets (5 mg) by mouth daily  Qty: 60 tablet, Refills: 1    Associated Diagnoses: Macrocytic anemia         STOP taking these medications       diphenhydrAMINE-acetaminophen (TYLENOL PM)  MG tablet Comments:   Reason for Stopping:             Allergies   Allergies   Allergen Reactions     Spinach      Mouth gets numb     Lexapro [Escitalopram] Nausea     Severe nausea; does not wish to take again     Sertraline Nausea     Severe nausea; does not wish to take again

## 2018-11-18 NOTE — PLAN OF CARE
Problem: Patient Care Overview  Goal: Plan of Care/Patient Progress Review  Outcome: No Change  Pt A&O. VSS on RA. Soft BP 99/48 this am. Denies pain/nausea. Up ad mariposa, calls appropriately. No signs of bleeding. Plan to monitor hemoglobin, continue anemia workup.

## 2018-11-18 NOTE — PLAN OF CARE
Problem: Patient Care Overview  Goal: Plan of Care/Patient Progress Review  Outcome: Adequate for Discharge Date Met: 11/18/18  VSS on RA. A&Ox4. Independent in room; up ad mariposa. Hgb 8.4. Pt adequate for discharge. PIV removed. Discharge instructions given to pt and answered questions. Medications were sent to pt's home pharmacy. Pt discharged at 1200.

## 2018-11-19 ENCOUNTER — OFFICE VISIT (OUTPATIENT)
Dept: FAMILY MEDICINE | Facility: CLINIC | Age: 28
End: 2018-11-19
Payer: COMMERCIAL

## 2018-11-19 VITALS
HEIGHT: 66 IN | TEMPERATURE: 99.7 F | SYSTOLIC BLOOD PRESSURE: 110 MMHG | DIASTOLIC BLOOD PRESSURE: 68 MMHG | HEART RATE: 76 BPM | WEIGHT: 186 LBS | BODY MASS INDEX: 29.89 KG/M2

## 2018-11-19 DIAGNOSIS — Z09 HOSPITAL DISCHARGE FOLLOW-UP: Primary | ICD-10-CM

## 2018-11-19 DIAGNOSIS — D52.0 DIETARY FOLATE DEFICIENCY ANEMIA: ICD-10-CM

## 2018-11-19 DIAGNOSIS — F10.10 ALCOHOL USE DISORDER, MILD, ABUSE: ICD-10-CM

## 2018-11-19 DIAGNOSIS — F17.200 TOBACCO USE DISORDER: ICD-10-CM

## 2018-11-19 DIAGNOSIS — D53.9 MACROCYTIC ANEMIA: ICD-10-CM

## 2018-11-19 DIAGNOSIS — K76.0 FATTY LIVER: ICD-10-CM

## 2018-11-19 DIAGNOSIS — D69.6 THROMBOCYTOPENIA (H): ICD-10-CM

## 2018-11-19 PROBLEM — D52.9 ANEMIA DUE TO FOLIC ACID DEFICIENCY, UNSPECIFIED DEFICIENCY TYPE: Status: ACTIVE | Noted: 2018-11-19

## 2018-11-19 LAB
CMV IGG SERPL QL IA: 0.2 AI (ref 0–0.8)
CMV IGM SERPL QL IA: 0.2 AI (ref 0–0.8)
COPATH REPORT: NORMAL
COPATH REPORT: NORMAL
EBV VCA IGG SER QL IA: >8 AI (ref 0–0.8)
EBV VCA IGM SER QL IA: 0.3 AI (ref 0–0.8)
ERYTHROCYTE [DISTWIDTH] IN BLOOD BY AUTOMATED COUNT: 27.2 % (ref 10–15)
HBV CORE AB SERPL QL IA: NONREACTIVE
HBV SURFACE AB SERPL IA-ACNC: >1000 M[IU]/ML
HBV SURFACE AG SERPL QL IA: NONREACTIVE
HCT VFR BLD AUTO: 28.8 % (ref 35–47)
HCV AB SERPL QL IA: NONREACTIVE
HGB BLD-MCNC: 8.9 G/DL (ref 11.7–15.7)
HIV 1+2 AB+HIV1 P24 AG SERPL QL IA: NONREACTIVE
MCH RBC QN AUTO: 35.3 PG (ref 26.5–33)
MCHC RBC AUTO-ENTMCNC: 30.9 G/DL (ref 31.5–36.5)
MCV RBC AUTO: 114 FL (ref 78–100)
PLATELET # BLD AUTO: 104 10E9/L (ref 150–450)
RBC # BLD AUTO: 2.52 10E12/L (ref 3.8–5.2)
WBC # BLD AUTO: 8.8 10E9/L (ref 4–11)

## 2018-11-19 PROCEDURE — 85027 COMPLETE CBC AUTOMATED: CPT | Performed by: NURSE PRACTITIONER

## 2018-11-19 PROCEDURE — 36415 COLL VENOUS BLD VENIPUNCTURE: CPT | Performed by: NURSE PRACTITIONER

## 2018-11-19 PROCEDURE — 99495 TRANSJ CARE MGMT MOD F2F 14D: CPT | Performed by: NURSE PRACTITIONER

## 2018-11-19 NOTE — MR AVS SNAPSHOT
After Visit Summary   11/19/2018    Karla Gordon    MRN: [id number]           Patient Information     Date Of Birth          1990        Visit Information        Provider Department      11/19/2018 11:00 AM Millie Moore NP [facility]        Today's Diagnoses     Macrocytic anemia    -  1    Anemia due to folic acid deficiency, unspecified deficiency type          Care Instructions    [facility]   Discharged by : Tiny Hook MA    Paper scripts provided to patient : no     If you have any questions regarding your visit please contact your care team:     Team Gold                Clinic Hours Telephone Number     Dr. Bonnie Moore, CNP  Naina Kay, CNP 7am-7pm  Monday - Thursday   7am-5pm  Fridays  [phone]   (Appointment scheduling available 24/7)     RN Line  [phone] option 2     Urgent Care - [place] - 11am-9pm Monday-Friday Saturday-Sunday- 9am-5pm     [place] -   5pm-9pm Monday-Friday Saturday-Sunday- 9am-5pm    [phone] - [place]    [phone] - [place]       For a Price Quote for your services, please call our Consumer Price Line at [phone].     What options do I have for visits at the clinic other than the traditional office visit?     To expand how we care for you, many of our providers are utilizing electronic visits (e-visits) and telephone visits, when medically appropriate, for interactions with their patients rather than a visit in the clinic. We also offer nurse visits for many medical concerns. Just like any other service, we will bill your insurance company for this type of visit based on time spent on the phone with your provider. Not all insurance companies cover these visits. Please check with your medical insurance if this type of visit is covered. You will be responsible for any charges that  are not paid by your insurance.   E-visits via [employer]: generally incur a $35.00 fee.     Telephone visits:  Time spent on the phone: *charged based on time that is spent on the phone in increments of 10 minutes. Estimated cost:   5-10 mins $30.00   11-20 mins. $59.00   21-30 mins. $85.00       Use [employer] (secure email communication and access to your chart) to send your primary care provider a message or make an appointment. Ask someone on your Team how to sign up for [employer].     As always, Thank you for trusting us with your health care needs!      [facility]:    Scheduling Appointments  Bruce Kruger [facility]  Call: [phone]    Wilfred Collazo [facility]  Call: [phone]    [facility]  Call: [phone]    For Gastroenterology referrals   [facility]   Clinics and Surgery Center, 4th Floor   [address] [place] [address]   Appointments: [phone]    WHERE TO GO FOR CARE?  Clinic    Make an appointment if you:       Are sick (cold, cough, flu, sore throat, earache or in pain).       Have a small injury (sprain, small cut, burn or broken bone).       Need a physical exam, Pap smear, vaccine or prescription refill.       Have questions about your health or medicines.    To reach us:      Call [phone] ([phone]). Open 24 hours every day. (For counseling services, call [phone].)    Log into [employer] at [email or web address]. (Visit [email or web address] to create an account.) Hospital emergency room    An emergency is a serious or life- threatening problem that must be treated right away.    Call 359 or get to the hospital if you have:      Very bad or sudden:            - Chest pain or pressure         - Bleeding         - Head or belly pain         - Dizziness or trouble seeing, walking or                          Speaking      Problems breathing      Blood in your vomit or you are coughing up  blood      A major injury (knocked out, loss of a finger or limb, rape, broken bone protruding from skin)    A mental health crisis. (Or call the Mental Health Crisis line at 1-366.714.9355 or Suicide Prevention Hotline at 1-540.951.1517.)    Open 24 hours every day. You don't need an appointment.     Urgent care    Visit urgent care for sickness or small injuries when the clinic is closed. You don't need an appointment. To check hours or find an urgent care near you, visit [email or web address]. Online care    Get online care from [facility] for more than 70 common problems, like colds, allergies and infections. Open 24 hours every day at:   [email or web address]   Need help deciding?    For advice about where to be seen, you may call your clinic and ask to speak with a nurse. We're here for you 24 hours every day.         If you are deaf or hard of hearing, please let us know. We provide many free services including sign language interpreters, oral interpreters, TTYs, telephone amplifiers, note takers and written materials.                   Follow-ups after your visit        Follow-up notes from your care team     Return in about 4 weeks (around 12/17/2018) for Physical Exam.      Your next 10 appointments already scheduled     Dec 10, 2018 12:45 PM CST   [facility] Visit [facility]   [facility]    [address] [place] [address] [phone]              Who to contact     If you have questions or need follow up information about today's clinic visit or your schedule please contact [facility] directly at [phone].  Normal or non-critical lab and imaging results will be communicated to you by MyChart, letter or phone within 4 business days after the clinic has received the results. If you do not hear from us within 7 days, please contact the clinic through MyChart or phone. If you have a critical or abnormal lab result, we will notify  "you by phone as soon as possible.  Submit refill requests through [employer] or call your pharmacy and they will forward the refill request to us. Please allow 3 business days for your refill to be completed.          Additional Information About Your Visit        Look.iohart Information     [employer] lets you send messages to your doctor, view your test results, renew your prescriptions, schedule appointments and more. To sign up, go to [email or web address] . Click on \"Log in\" on the left side of the screen, which will take you to the Welcome page. Then click on \"Sign up Now\" on the right side of the page.     You will be asked to enter the access code listed below, as well as some personal information. Please follow the directions to create your username and password.     Your access code is: [email or web address]  Expires: 2019 11:52 AM     Your access code will  in 90 days. If you need help or a new code, please call your [facility] clinic or [phone].        Care EveryWhere ID     This is your Care EveryWhere ID. This could be used by other organizations to access your [facility] medical records  [id number]        Your Vitals Were     Pulse Temperature Height Last Period BMI (Body Mass Index)       76 99.7  F (37.6  C) (Oral) 5' 6\" (1.676 m) (LMP Unknown) 30.02 kg/m2        Blood Pressure from Last 3 Encounters:   18 110/68   18 99/48   18 112/70    Weight from Last 3 Encounters:   18 186 lb (84.4 kg)   18 180 lb 1.9 oz (81.7 kg)   18 168 lb (76.2 kg)              We Performed the Following     CBC with platelets        Primary Care Provider Office Phone # Fax #    [facility] [phone]       [address] [facility] [place] [address]        Equal Access to Services     GISELLE KRAUSE : Talha Stein, purnima alegria, antony orozco. [facility] [phone].    ATENCIÓN: Si habla " español, tiene a hobbs disposición servicios gratuitos de asistencia lingüística. Elfego nguyen [phone].    We comply with applicable federal civil rights laws and [place] laws. We do not discriminate on the basis of race, color, national origin, age, disability, sex, sexual orientation, or gender identity.            Thank you!     Thank you for choosing [facility]  for your care. Our goal is always to provide you with excellent care. Hearing back from our patients is one way we can continue to improve our services. Please take a few minutes to complete the written survey that you may receive in the mail after your visit with us. Thank you!             Your Updated Medication List - Protect others around you: Learn how to safely use, store and throw away your medicines at www.disposemymeds.org.          This list is accurate as of 11/19/18 11:26 AM.  Always use your most recent med list.                   Brand Name Dispense Instructions for use Diagnosis    cyanocobalamin 100 MCG Tabs tablet    vitamin  B-12    150 tablet    Take 1 tablet (100 mcg) by mouth daily    Macrocytic anemia       folic acid 1 MG tablet    FOLVITE    60 tablet    Take 5 tablets (5 mg) by mouth daily    Macrocytic anemia

## 2018-11-19 NOTE — PROGRESS NOTES
Patient has clinic visit within 24-48 hours of discharge so no post DC follow up call is needed.    Name: GERA STRINGER MRN: [id number]   Date: 11/19/2018 Status: [facility]   Time: 11:00 AM Length: 20     Visit Type: SHORT [929]   PEDRO:     Provider: Millie Moore NP Department: [facility]/

## 2018-11-19 NOTE — PROGRESS NOTES
SUBJECTIVE:   Karla Gordon is a 28 year old female who presents to clinic today for the following health issues:    Has a future appointment with Hematology 12/10    Hospital Follow-up Visit:    Hospital/Nursing Home/IP Rehab Facility: [facility]  Date of Admission: 11/16  Date of Discharge: 11/18  Reason(s) for Admission:   Macrocytic anemia 2/2 severe folate deficiency  Alcohol use disorder  Steatosis  Thrombocytopenia  Anxiety/depression  Type 2 Myocardial Ischemia            Problems taking medications regularly:  None       Medication changes since discharge: None       Problems adhering to non-medication therapy:  None    Summary of hospitalization:  [facility] discharge summary reviewed  Diagnostic Tests/Treatments reviewed.  Follow up needed: Hematology appointment 12/10/18 at 12:45 pm scheduled with Dr. Foss  Other Healthcare Providers Involved in Patient s Care:         None  Update since discharge: improved.   Post Discharge Medication Reconciliation: discharge medications reconciled, continue medications without change.  Plan of care communicated with patient     Coding guidelines for this visit:  Type of Medical   Decision Making Face-to-Face Visit       within 7 Days of discharge Face-to-Face Visit        within 14 days of discharge   Moderate Complexity 77484 11925   High Complexity 01212 20047          Patient is feeling better.  She has no concerns today.    Problem list and histories reviewed & adjusted, as indicated.  Additional history: as documented    Patient Active Problem List   Diagnosis     Numbness and tingling of foot     Tinnitus, bilateral     Plugged feeling in ear, bilateral     Tobacco use disorder     Thrombocytopenia (H)     Fatty liver     Macrocytic anemia     Alcohol use disorder, mild, abuse     Dietary folate deficiency anemia     Past Surgical History:   Procedure Laterality Date     C ANESTH,SHOULDER REPLACEMENT Right 2015    right  partial shoulder replacement at age 25     HC TOOTH EXTRACTION W/FORCEP  2015       Social History   Substance Use Topics     Smoking status: Current Every Day Smoker     Packs/day: 0.50     Years: 7.00     Types: Cigarettes     Smokeless tobacco: Current User     Alcohol use Yes      Comment: daily, few drinks a day     Family History   Problem Relation Age of Onset     Cancer Mother 62     bladder cancer     Hypertension Mother      Psoriasis Father      No Known Problems Brother      No Known Problems Sister      No Known Problems Son      Diabetes No family hx of      Hyperlipidemia No family hx of      Breast Cancer No family hx of      Colon Cancer No family hx of          Current Outpatient Prescriptions   Medication Sig Dispense Refill     cyanocobalamin (VITAMIN  B-12) 100 MCG TABS tablet Take 1 tablet (100 mcg) by mouth daily 150 tablet 0     diphenhydrAMINE-acetaminophen (TYLENOL PM)  MG tablet Take 1 tablet by mouth nightly as needed for sleep       folic acid (FOLVITE) 1 MG tablet Take 5 tablets (5 mg) by mouth daily 60 tablet 1     Allergies   Allergen Reactions     Spinach      Mouth gets numb     Lexapro [Escitalopram] Nausea     Severe nausea; does not wish to take again     Sertraline Nausea     Severe nausea; does not wish to take again     BP Readings from Last 3 Encounters:   11/19/18 110/68   11/18/18 99/48   04/04/18 112/70    Wt Readings from Last 3 Encounters:   11/19/18 186 lb (84.4 kg)   11/17/18 180 lb 1.9 oz (81.7 kg)   04/04/18 168 lb (76.2 kg)                    Reviewed and updated as needed this visit by clinical staff  Tobacco  Allergies  Meds  Problems  Med Hx  Surg Hx  Fam Hx  Soc Hx        Reviewed and updated as needed this visit by Provider  Allergies  Meds  Problems         ROS:  Constitutional, HEENT, cardiovascular, pulmonary, gi and gu systems are negative, except as otherwise noted.    OBJECTIVE:     /68  Pulse 76  Temp 99.7  F (37.6  C) (Oral)  Ht  "5' 6\" (1.676 m)  Wt 186 lb (84.4 kg)  LMP  (LMP Unknown)  BMI 30.02 kg/m2  Body mass index is 30.02 kg/(m^2).  GENERAL: healthy, alert, no distress and over weight  RESP: lungs clear to auscultation - no rales, rhonchi or wheezes  CV: regular rate and rhythm, normal S1 S2, no S3 or S4, no murmur, click or rub, mild peripheral edema  ABDOMEN: soft, nontender, no hepatosplenomegaly, no masses and bowel sounds normal  PSYCH: mentation appears normal, affect normal/bright    Diagnostic Test Results:  Results for orders placed or performed in visit on 11/19/18   CBC with platelets   Result Value Ref Range    WBC 8.8 4.0 - 11.0 10e9/L    RBC Count 2.52 (L) 3.8 - 5.2 10e12/L    Hemoglobin 8.9 (L) 11.7 - 15.7 g/dL    Hematocrit 28.8 (L) 35.0 - 47.0 %     (H) 78 - 100 fl    MCH 35.3 (H) 26.5 - 33.0 pg    MCHC 30.9 (L) 31.5 - 36.5 g/dL    RDW 27.2 (H) 10.0 - 15.0 %    Platelet Count 104 (L) 150 - 450 10e9/L       ASSESSMENT/PLAN:     1. Hospital discharge follow-up  - Patient has pending lab results from hospital report that are not yet resulted.    2. Macrocytic anemia  Improving.    - CBC with platelets  - Patient is on folate and B12 supplements.  - Patient will keep appointment with Dr. Foss Hematology 12/10/18.    3. Dietary folate deficiency anemia  - Patient on folate supplement and will continue.    4. Alcohol use disorder, mild, abuse  - Advised on alcohol cessation.    5. Fatty liver  Abdominal U/S from 11/16/18 showed  \"IMPRESSION:      1. Increased hepatic echogenicity as can be seen in intrinsic  parenchymal disease such as steatosis.  2. Liver is enlarged, measuring 21.6 cm.  3. Spleen is borderline enlarged, measuring 1.9 cm.\"  - Will continue to monitor liver transaminases periodically.    6. Thrombocytopenia (H)  Chronic, stable.  - Patient will follow up with Dr. Foss in Hematology 12/10/18.    7. Tobacco use disorder  - Advised on smoking cessation.      Return in about 4 weeks (around " 12/17/2018) for Physical Exam.    Millie Moore NP  [facility]

## 2018-11-19 NOTE — LETTER
November 19, 2018      Karla Gordon  [address]  [place] [address]        Dear Karla,     Your Hemoglobin is 8.9, which is improved from 8.4. Enclosed is a copy of these results.  If you have any further questions or problems, please contact our office at [phone].    Results for orders placed or performed in visit on 11/19/18   CBC with platelets   Result Value Ref Range    WBC 8.8 4.0 - 11.0 10e9/L    RBC Count 2.52 (L) 3.8 - 5.2 10e12/L    Hemoglobin 8.9 (L) 11.7 - 15.7 g/dL    Hematocrit 28.8 (L) 35.0 - 47.0 %     (H) 78 - 100 fl    MCH 35.3 (H) 26.5 - 33.0 pg    MCHC 30.9 (L) 31.5 - 36.5 g/dL    RDW 27.2 (H) 10.0 - 15.0 %    Platelet Count 104 (L) 150 - 450 10e9/L       Sincerely,      Millie Moore, DNP, APRN, CNP/pv

## 2018-11-20 PROBLEM — D52.0 DIETARY FOLATE DEFICIENCY ANEMIA: Status: ACTIVE | Noted: 2018-11-20

## 2018-11-20 LAB
B19V IGG SER IA-ACNC: 0.34 IV
B19V IGM SER IA-ACNC: 0.39 IV
HIV 1+2 AB+HIV1 P24 AG SERPL QL IA: NONREACTIVE

## 2018-11-21 LAB
B19V DNA SER QL NAA+PROBE: NOT DETECTED
LAB SCANNED RESULT: NORMAL
SPECIMEN SOURCE: NORMAL

## 2018-12-25 ENCOUNTER — HOSPITAL ENCOUNTER (EMERGENCY)
Facility: CLINIC | Age: 28
Discharge: HOME | End: 2018-12-25
Attending: EMERGENCY MEDICINE | Admitting: EMERGENCY MEDICINE
Payer: COMMERCIAL

## 2018-12-25 VITALS
RESPIRATION RATE: 16 BRPM | WEIGHT: 182.1 LBS | HEART RATE: 120 BPM | DIASTOLIC BLOOD PRESSURE: 74 MMHG | TEMPERATURE: 98.4 F | OXYGEN SATURATION: 95 % | HEIGHT: 66 IN | SYSTOLIC BLOOD PRESSURE: 110 MMHG | BODY MASS INDEX: 29.27 KG/M2

## 2018-12-25 DIAGNOSIS — R79.89 ELEVATED LIVER FUNCTION TESTS: ICD-10-CM

## 2018-12-25 DIAGNOSIS — F10.229 ALCOHOL DEPENDENCE WITH INTOXICATION WITH COMPLICATION (H): ICD-10-CM

## 2018-12-25 DIAGNOSIS — D69.6 THROMBOCYTOPENIA (H): ICD-10-CM

## 2018-12-25 DIAGNOSIS — D51.9 ANEMIA DUE TO VITAMIN B12 DEFICIENCY, UNSPECIFIED B12 DEFICIENCY TYPE: ICD-10-CM

## 2018-12-25 LAB
ABO + RH BLD: NORMAL
ABO + RH BLD: NORMAL
ALBUMIN SERPL-MCNC: 2.2 G/DL (ref 3.4–5)
ALP SERPL-CCNC: 340 U/L (ref 40–150)
ALT SERPL W P-5'-P-CCNC: 108 U/L (ref 0–50)
ANION GAP SERPL CALCULATED.3IONS-SCNC: 8 MMOL/L (ref 3–14)
AST SERPL W P-5'-P-CCNC: ABNORMAL U/L (ref 0–45)
BILIRUB SERPL-MCNC: 1.9 MG/DL (ref 0.2–1.3)
BLD GP AB SCN SERPL QL: NORMAL
BLOOD BANK CMNT PATIENT-IMP: NORMAL
BUN SERPL-MCNC: 2 MG/DL (ref 7–30)
CALCIUM SERPL-MCNC: 7 MG/DL (ref 8.5–10.1)
CHLORIDE SERPL-SCNC: 98 MMOL/L (ref 94–109)
CO2 SERPL-SCNC: 27 MMOL/L (ref 20–32)
CREAT SERPL-MCNC: 0.39 MG/DL (ref 0.52–1.04)
ETHANOL SERPL-MCNC: 0.38 G/DL
GFR SERPL CREATININE-BSD FRML MDRD: >90 ML/MIN/{1.73_M2}
GLUCOSE SERPL-MCNC: 134 MG/DL (ref 70–99)
INR PPP: 1.25 (ref 0.86–1.14)
POTASSIUM SERPL-SCNC: 4 MMOL/L (ref 3.4–5.3)
PROT SERPL-MCNC: 7.1 G/DL (ref 6.8–8.8)
RETICS # AUTO: 105.9 10E9/L (ref 25–95)
RETICS/RBC NFR AUTO: 3.6 % (ref 0.5–2)
SODIUM SERPL-SCNC: 132 MMOL/L (ref 133–144)
SPECIMEN EXP DATE BLD: NORMAL

## 2018-12-25 PROCEDURE — 85025 COMPLETE CBC W/AUTO DIFF WBC: CPT | Performed by: EMERGENCY MEDICINE

## 2018-12-25 PROCEDURE — 85610 PROTHROMBIN TIME: CPT | Performed by: EMERGENCY MEDICINE

## 2018-12-25 PROCEDURE — 99284 EMERGENCY DEPT VISIT MOD MDM: CPT | Mod: Z6 | Performed by: EMERGENCY MEDICINE

## 2018-12-25 PROCEDURE — 80053 COMPREHEN METABOLIC PANEL: CPT | Performed by: EMERGENCY MEDICINE

## 2018-12-25 PROCEDURE — 86901 BLOOD TYPING SEROLOGIC RH(D): CPT | Performed by: EMERGENCY MEDICINE

## 2018-12-25 PROCEDURE — 86850 RBC ANTIBODY SCREEN: CPT | Performed by: EMERGENCY MEDICINE

## 2018-12-25 PROCEDURE — 40000611 ZZHCL STATISTIC MORPHOLOGY W/INTERP HEMEPATH TC 85060: Performed by: EMERGENCY MEDICINE

## 2018-12-25 PROCEDURE — 80320 DRUG SCREEN QUANTALCOHOLS: CPT | Performed by: EMERGENCY MEDICINE

## 2018-12-25 PROCEDURE — 86900 BLOOD TYPING SEROLOGIC ABO: CPT | Performed by: EMERGENCY MEDICINE

## 2018-12-25 PROCEDURE — 85045 AUTOMATED RETICULOCYTE COUNT: CPT | Performed by: EMERGENCY MEDICINE

## 2018-12-25 PROCEDURE — 99284 EMERGENCY DEPT VISIT MOD MDM: CPT | Performed by: EMERGENCY MEDICINE

## 2018-12-25 RX ORDER — FOLIC ACID 1 MG/1
1 TABLET ORAL DAILY
Qty: 30 TABLET | Refills: 0 | Status: SHIPPED | OUTPATIENT
Start: 2018-12-25 | End: 2018-12-28 | Stop reason: DRUGHIGH

## 2018-12-25 ASSESSMENT — MIFFLIN-ST. JEOR: SCORE: 1562.75

## 2018-12-25 ASSESSMENT — ENCOUNTER SYMPTOMS
HEMATURIA: 0
DYSURIA: 0
APPETITE CHANGE: 0
SHORTNESS OF BREATH: 0
BLOOD IN STOOL: 0
ABDOMINAL PAIN: 0
PALPITATIONS: 0
FATIGUE: 1

## 2018-12-25 NOTE — ED AVS SNAPSHOT
[facility] [place], Emergency Department  [address] [place] [address]  Phone:  [phone]                                    Karla Gordon   MRN: [id number]    Department:  [facility], Emergency Department   Date of Visit:  12/25/2018           After Visit Summary Signature Page    I have received my discharge instructions, and my questions have been answered. I have discussed any challenges I see with this plan with the nurse or doctor.    ..........................................................................................................................................  Patient/Patient Representative Signature      ..........................................................................................................................................  Patient Representative Print Name and Relationship to Patient    ..................................................               ................................................  Date                                   Time    ..........................................................................................................................................  Reviewed by Signature/Title    ...................................................              ..............................................  Date                                               Time          22EPIC Rev 08/18

## 2018-12-26 ENCOUNTER — TELEPHONE (OUTPATIENT)
Dept: FAMILY MEDICINE | Facility: CLINIC | Age: 28
End: 2018-12-26

## 2018-12-26 LAB
BASOPHILS # BLD AUTO: 0 10E9/L (ref 0–0.2)
BASOPHILS NFR BLD AUTO: 0.1 %
COPATH REPORT: NORMAL
DIFFERENTIAL METHOD BLD: ABNORMAL
EOSINOPHIL # BLD AUTO: 0 10E9/L (ref 0–0.7)
EOSINOPHIL NFR BLD AUTO: 0.4 %
ERYTHROCYTE [DISTWIDTH] IN BLOOD BY AUTOMATED COUNT: 17.9 % (ref 10–15)
HCT VFR BLD AUTO: 28.7 % (ref 35–47)
HGB BLD-MCNC: 9.6 G/DL (ref 11.7–15.7)
IMM GRANULOCYTES # BLD: 0 10E9/L (ref 0–0.4)
IMM GRANULOCYTES NFR BLD: 0.3 %
LYMPHOCYTES # BLD AUTO: 1.5 10E9/L (ref 0.8–5.3)
LYMPHOCYTES NFR BLD AUTO: 19.9 %
MCH RBC QN AUTO: 33 PG (ref 26.5–33)
MCHC RBC AUTO-ENTMCNC: 33.4 G/DL (ref 31.5–36.5)
MCV RBC AUTO: 99 FL (ref 78–100)
MONOCYTES # BLD AUTO: 0.5 10E9/L (ref 0–1.3)
MONOCYTES NFR BLD AUTO: 6.1 %
NEUTROPHILS # BLD AUTO: 5.5 10E9/L (ref 1.6–8.3)
NEUTROPHILS NFR BLD AUTO: 73.2 %
NRBC # BLD AUTO: 0 10*3/UL
NRBC BLD AUTO-RTO: 0 /100
PLATELET # BLD AUTO: 44 10E9/L (ref 150–450)
PLATELET # BLD EST: ABNORMAL 10*3/UL
RBC # BLD AUTO: 2.91 10E12/L (ref 3.8–5.2)
WBC # BLD AUTO: 7.5 10E9/L (ref 4–11)

## 2018-12-26 NOTE — TELEPHONE ENCOUNTER
Reason for Call:  Other call back    Detailed comments: Pt called in wanting to speak to Millie Moore about her health concerns they discussed in the last visit she was seen. Pt does not have a working car at this time. She is wondering if she would be able to get a phone visit with Millie.    Phone Number Patient can be reached at: Home number on file [phone] (home)    Best Time:     Can we leave a detailed message on this number? YES    Call taken on 12/26/2018 at 5:42 PM by Noris Jerez

## 2018-12-26 NOTE — ED TRIAGE NOTES
Pt came for eval increased fatigue, tiredness, and new bruises on left side and leg. Had to be transfused 6 weeks ago.

## 2018-12-26 NOTE — DISCHARGE INSTRUCTIONS
You have been seen in the ER for fatigue.  Your hemoglobin today is above 9, which is great.  Your platelets are low at 44, which is likely due to alcohol.  Your other blood tests also show evidence of damage from alcohol.  It is critical that you stop drinking in order to prevent serious health consequences.  Definitely talk to your primary clinic about this as soon as possible.      If you are interested in detox, call the number below and ask if there are any detox beds.  [facility] Intake:  [phone]

## 2018-12-26 NOTE — ED PROVIDER NOTES
"  History     Chief Complaint   Patient presents with     Anemia     HPI  Karla Gordon is a 28 year old female with a history of anxiety, depression, alcohol use, fatty liver, thrombocytopenia (dx 3/22/18), macrocytic anemia (4/3/18), and dietary folate deficiency anemia for which she was admitted from 11/16-11/18/18. Patient had an Hgb of 4.6 at that time and received 3 U of PRBC (the first time she's required a transfusion); she was also started on daily Folate and B12.    She presents today for evaluation of fatigue and bilateral leg and feet swelling (a symptom she's never had before). Patient states she she had been taking her Folate and B12 regularly but ran out of her folic acid a week ago as she has been unable to meet with her hematologist through her [facility]).  She states her appointment was cancelled last week and she does not have another appointment set at this time. Patient denies blood in stool/urine or epistaxis but does note some bruising. She denies heavy menses as well, and reports a LMP 9 days ago that was normal. Patient denies chance of pregnancy. Denies abdominal pain, chest pain, shortness of breath, recent palpitations, change in appetite, or dysuria. Other than her B12, patient only takes a daily multivitamin.    Notably, patient is a smoker and daily drinker. She states she drinks about 2-3 vodka cocktails a day, with her last drink at 7 PM today, but has been working with her GP to quit. Patient reports heavier drinking in her \"early 20s\" but states she was able to \"quit no problem\" at that time. She endorses a history of \"seizures\", but had it worked up at an outside facility. Her history of that visit is unclear, but, per patient report, she had been told her seizure was not likely due to alcohol. Patient's last alleged seizure was in July of this year. She also states that she has been told that her \"seizure\" episodes in the past may be " not seizure but instead syncope.  She hasn't had this in several months. She denies any other drug use.     Past Medical History:   Diagnosis Date     Alcohol use disorder, mild, abuse 11/19/2018     Dietary folate deficiency anemia 11/20/2018     Fatty liver 3/22/2018    3/21/18:  ALT 76,  (4.2 xULN).   4/2/18:  Hepatitis B and C negative  Patient also with thrombocytopenia and mild macrocytic anemia  11/16/18:  IMPRESSION:     1. Increased hepatic echogenicity as can be seen in intrinsic parenchymal disease such as steatosis. 2. Liver is enlarged, measuring 21.6 cm. 3. Spleen is borderline enlarged, measuring 1.9 cm     Macrocytic anemia 4/3/2018     Thrombocytopenia (H) 3/22/2018    3/21/18:  Platelets noted at 92.   4/2/18:  Platelets 92.  Mild macrocytic anemia.  Peripheral smear pending, consider Hematology referral     Tobacco use disorder 3/21/2018       Past Surgical History:   Procedure Laterality Date     C ANESTH,SHOULDER REPLACEMENT Right 2015    right partial shoulder replacement at age 25     HC TOOTH EXTRACTION W/FORCEP  2015       Family History   Problem Relation Age of Onset     Cancer Mother 62        bladder cancer     Hypertension Mother      Psoriasis Father      No Known Problems Brother      No Known Problems Sister      No Known Problems Son      Diabetes No family hx of      Hyperlipidemia No family hx of      Breast Cancer No family hx of      Colon Cancer No family hx of        Social History     Tobacco Use     Smoking status: Current Every Day Smoker     Packs/day: 0.50     Years: 7.00     Pack years: 3.50     Types: Cigarettes     Smokeless tobacco: Current User   Substance Use Topics     Alcohol use: Yes     Comment: daily, few drinks a day       No current facility-administered medications for this encounter.      Current Outpatient Medications   Medication     cyanocobalamin (VITAMIN  B-12) 100 MCG TABS tablet     diphenhydrAMINE-acetaminophen (TYLENOL PM)  MG tablet  "    folic acid (FOLVITE) 1 MG tablet     folic acid (FOLVITE) 1 MG tablet        Allergies   Allergen Reactions     Spinach      Mouth gets numb     Lexapro [Escitalopram] Nausea     Severe nausea; does not wish to take again     Sertraline Nausea     Severe nausea; does not wish to take again     I have reviewed the Medications, Allergies, Past Medical and Surgical History, and Social History in the Epic system.    Review of Systems   Constitutional: Positive for fatigue. Negative for appetite change.   HENT: Negative for nosebleeds.    Respiratory: Negative for shortness of breath.    Cardiovascular: Positive for leg swelling (bilateral). Negative for chest pain and palpitations.   Gastrointestinal: Negative for abdominal pain and blood in stool.   Genitourinary: Negative for dysuria and hematuria.   All other systems reviewed and are negative.      Physical Exam   BP: 124/85  Pulse: 120  Heart Rate: 114  Temp: 98.1  F (36.7  C)  Resp: 22  Height: 167.6 cm (5' 6\")  Weight: 82.6 kg (182 lb 1.6 oz)  SpO2: 97 %      Physical Exam   Constitutional: She appears well-developed and well-nourished.   Alert, cooperative, NAD.  Smells of alcohol. No slurred speech.   HENT:   Head: Normocephalic.   Mouth/Throat: Oropharynx is clear and moist.   Eyes: Conjunctivae are normal. Pupils are equal, round, and reactive to light.   Cardiovascular:   Tachycardic, regular rhythm   Pulmonary/Chest: Effort normal and breath sounds normal.   Abdominal:   Soft, obese, no TTP, normal bowel sounds   Musculoskeletal: She exhibits edema.   BLE swelling noted   Psychiatric: She has a normal mood and affect.   Nursing note and vitals reviewed.      ED Course        Procedures       8:20 PM  The patient was seen and examined by Dr. Sorto in Room 4.          Critical Care time:  none             Results for orders placed or performed during the hospital encounter of 12/25/18   CBC with platelets differential   Result Value Ref Range    WBC 7.5 " 4.0 - 11.0 10e9/L    RBC Count 2.91 (L) 3.8 - 5.2 10e12/L    Hemoglobin 9.6 (L) 11.7 - 15.7 g/dL    Hematocrit 28.7 (L) 35.0 - 47.0 %    MCV 99 78 - 100 fl    MCH 33.0 26.5 - 33.0 pg    MCHC 33.4 31.5 - 36.5 g/dL    RDW 17.9 (H) 10.0 - 15.0 %    Platelet Count 44 (LL) 150 - 450 10e9/L    Diff Method PENDING     % Neutrophils 73.2 %    % Lymphocytes 19.9 %    % Monocytes 6.1 %    % Eosinophils 0.4 %    % Basophils 0.1 %    % Immature Granulocytes 0.3 %    Nucleated RBCs 0 0 /100    Absolute Neutrophil 5.5 1.6 - 8.3 10e9/L    Absolute Lymphocytes 1.5 0.8 - 5.3 10e9/L    Absolute Monocytes 0.5 0.0 - 1.3 10e9/L    Absolute Eosinophils 0.0 0.0 - 0.7 10e9/L    Absolute Basophils 0.0 0.0 - 0.2 10e9/L    Abs Immature Granulocytes 0.0 0 - 0.4 10e9/L    Absolute Nucleated RBC 0.0     Platelet Estimate Confirming automated cell count    INR   Result Value Ref Range    INR 1.25 (H) 0.86 - 1.14   Comprehensive metabolic panel   Result Value Ref Range    Sodium 132 (L) 133 - 144 mmol/L    Potassium 4.0 3.4 - 5.3 mmol/L    Chloride 98 94 - 109 mmol/L    Carbon Dioxide 27 20 - 32 mmol/L    Anion Gap 8 3 - 14 mmol/L    Glucose 134 (H) 70 - 99 mg/dL    Urea Nitrogen 2 (L) 7 - 30 mg/dL    Creatinine 0.39 (L) 0.52 - 1.04 mg/dL    GFR Estimate >90 >60 mL/min/[1.73_m2]    GFR Estimate If Black >90 >60 mL/min/[1.73_m2]    Calcium 7.0 (L) 8.5 - 10.1 mg/dL    Bilirubin Total 1.9 (H) 0.2 - 1.3 mg/dL    Albumin 2.2 (L) 3.4 - 5.0 g/dL    Protein Total 7.1 6.8 - 8.8 g/dL    Alkaline Phosphatase 340 (H) 40 - 150 U/L     (H) 0 - 50 U/L    AST Unsatisfactory specimen - hemolyzed 0 - 45 U/L   Reticulocyte count   Result Value Ref Range    % Retic 3.6 (H) 0.5 - 2.0 %    Absolute Retic 105.9 (H) 25 - 95 10e9/L   Alcohol   Result Value Ref Range    Ethanol g/dL 0.38 (HH) <0.01 g/dL   ABO/Rh type and screen   Result Value Ref Range    ABO A     RH(D) Pos     Antibody Screen Neg     Test Valid Only At          [facility]  "[facility]    Specimen Expires 12/28/2018               Assessments & Plan (with Medical Decision Making)   This is a 28-year-old who presents to the ED today complaining of severe fatigue.  Approximately 6 weeks ago, she was admitted to the hospital with macrocytic anemia secondary to severe folate deficiency, at that point, she had a hemoglobin of 4.6.  She was hospitalized and received 3 units of packed red blood cells.  She subsequently was discharged with instructions to follow-up with Hematology.  Patient reports that her Hematology clinic appointment was canceled secondary to the holidays and she has not yet made another appointment.  She is now noticing worsening fatigue, bilateral lower extremity swelling, and is concerned that this is similar to previous.  Reviewing her prior admission, she had microcytic anemia, thrombocytopenia and severe folate deficiency.  She does abuse alcohol and drinks on a daily basis. She states that she drinks \"probably too much\". When asked to further quantify that, she states that she will drink 2-3 mixed drinks per evening. This is likely partially contributing to her problem today.    Here in the emergency department, she is alert, cooperative, in no acute distress.  She does have some tachycardia with a heart rate in the 120s.  Blood pressure is 124/85.  Other than tachycardia, she does not have any significant findings on physical exam.  The patient does have bilateral lower extremity swelling.    I do have a strong suspicion is that she is likely severely anemic again, though other etiologies still need to be considered.  She is denying any GI bleeding,  bleeding, or epistaxis and denies heavy menses.  We did establish IV access and we did draw blood for laboratory analysis.  CBC shows a hemoglobin of 9.6, which is actually quite good compared to her recent levels, again, 6 weeks ago, she had presented with a hemoglobin in the 4 range. Platelet count today " is 44,000. Reviewing prior platelet levels, she has had significant thrombocytopenia in the past, though this is a bit lower than she has had before.  She does not have any active bleeding at present.  Retic count is 3.6, INR is 1.25.  Comprehensive metabolic panel demonstrates normal electrolytes and below normal creatinine at 0.39.  Albumin is low at 2.2, which is likely the reason that she is having such significant lower extremity swelling due to decreased oncotic pressure.  ALT is 108, AST was hemolyzed, and alkaline phosphatase was elevated at 340.  Bilirubin was 1.9.  All of these elevations have been noted previously.  Serum alcohol level here in the ED is 0.38. We also did do a peripheral smear.  Reviewing prior labs, during her last hospitalization she had hepatitis serologies checked, she was negative for hep C and negative/nonreactive for the hepatitis B surface antigen, nonreactive for hepatitis B core antibody and did have immunity based on the hepatitis B surface antibody.  HIV was negative, parvovirus B19 was negative, and EBV serology showed elevated EBV Capsid Antibody/IgG suggestive of past exposure. IgM antibody was within normal limits.    I had a discussion with the patient where I explained that her hemoglobin is actually quite good for her, and recommended that she continue to take her B12 and folate supplements.  I also explained that her thrombocytopenia and elevated LFTs are likely a result of her alcohol use.  Patient acknowledges that she has a problem with alcohol abuse and states she is ready to quit.  She is planning on calling her primary care provider tomorrow to try to get some help with this.  She is not interested in detox at present.  Patient states that she has been able to stop drinking on her own in the past without any assistance or intervention.  Patient seems to understand that alcohol is having significant ill effects on her liver at this point.  I do not see any reason  for acute hospitalization.  I do believe it is going to be critical that she discontinue her alcohol use.  Patient will be given the phone number to detox in case she changes her mind.  She will be discharged in the care of her .    This part of the document was transcribed by Basim Woody, Medical Scribe.     I have reviewed the nursing notes.    I have reviewed the findings, diagnosis, plan and need for follow up with the patient.       Medication List      Modified    * folic acid 1 MG tablet  Commonly known as:  FOLVITE  5 mg, Oral, DAILY  What changed:  Another medication with the same name was added. Make sure you understand how and when to take each.     * folic acid 1 MG tablet  Commonly known as:  FOLVITE  1 mg, Oral, DAILY  What changed:  You were already taking a medication with the same name, and this prescription was added. Make sure you understand how and when to take each.         * This list has 2 medication(s) that are the same as other medications prescribed for you. Read the directions carefully, and ask your doctor or other care provider to review them with you.                Final diagnoses:   Alcohol dependence with intoxication with complication (H)   Thrombocytopenia (H)   Elevated liver function tests   Anemia due to vitamin B12 deficiency, unspecified B12 deficiency type   I, Basim Woody, am serving as a trained medical scribe to document services personally performed by Allyn Sorto MD, based on the provider's statements to me.   I, Allyn Sorto MD, was physically present and have reviewed and verified the accuracy of this note documented by Basim Woody.      12/25/2018   [facility], [place], EMERGENCY DEPARTMENT     Allyn Sorto MD  12/25/18 1955

## 2018-12-27 NOTE — TELEPHONE ENCOUNTER
You can schedule her in 12/28/18 at 8 am, if she is willing to do this.    Millie Moore, DNP, APRN, CNP

## 2018-12-27 NOTE — TELEPHONE ENCOUNTER
Spoke to patient she would like to do a phone visit with provider due to transportation problems she had ER visit yesterday again and is requesting further help with drinking cessation. She would like that to be in form of medication therapy. Unable to get in this week will route to pcp seems more urgent given patients admits she is finding it hard to stop on her own.  Are you able to fit her in for phone visit thur or Friday?  Melodie Sotelo,Clinic Rn  [place]

## 2018-12-28 ENCOUNTER — PATIENT OUTREACH (OUTPATIENT)
Dept: CARE COORDINATION | Facility: CLINIC | Age: 28
End: 2018-12-28

## 2018-12-28 ENCOUNTER — VIRTUAL VISIT (OUTPATIENT)
Dept: FAMILY MEDICINE | Facility: CLINIC | Age: 28
End: 2018-12-28
Payer: COMMERCIAL

## 2018-12-28 DIAGNOSIS — F10.20 ALCOHOL USE DISORDER, MODERATE, DEPENDENCE (H): Primary | ICD-10-CM

## 2018-12-28 DIAGNOSIS — D53.9 MACROCYTIC ANEMIA: ICD-10-CM

## 2018-12-28 DIAGNOSIS — D69.6 THROMBOCYTOPENIA (H): ICD-10-CM

## 2018-12-28 DIAGNOSIS — D52.0 DIETARY FOLATE DEFICIENCY ANEMIA: ICD-10-CM

## 2018-12-28 DIAGNOSIS — Z65.9 PSYCHOSOCIAL PROBLEM: ICD-10-CM

## 2018-12-28 PROCEDURE — 98967 PH1 ASSMT&MGMT NQHP 11-20: CPT | Performed by: NURSE PRACTITIONER

## 2018-12-28 RX ORDER — LORAZEPAM 0.5 MG/1
0.5 TABLET ORAL EVERY 6 HOURS PRN
Qty: 15 TABLET | Refills: 0 | Status: SHIPPED | OUTPATIENT
Start: 2018-12-28 | End: 2019-01-18

## 2018-12-28 RX ORDER — FOLIC ACID 1 MG/1
5 TABLET ORAL DAILY
Qty: 150 TABLET | Refills: 1 | Status: SHIPPED | OUTPATIENT
Start: 2018-12-28 | End: 2019-05-06

## 2018-12-28 RX ORDER — NALTREXONE HYDROCHLORIDE 50 MG/1
50 TABLET, FILM COATED ORAL DAILY
Qty: 30 TABLET | Refills: 1 | Status: SHIPPED | OUTPATIENT
Start: 2018-12-28 | End: 2019-05-06

## 2018-12-28 NOTE — PROGRESS NOTES
"Karla Gordon is a 28 year old female who is being evaluated via a telephone visit.      The patient has been notified of following:     \"This telephone visit will be conducted via a call between you and your physician/provider. We have found that certain health care needs can be provided without the need for a physical exam.  This service lets us provide the care you need with a short phone conversation.  If a prescription is necessary we can send it directly to your pharmacy.  If lab work is needed we can place an order for that and you can then stop by our lab to have the test done at a later time.    We will bill your insurance company for this service.  Please check with your medical insurance if this type of visit is covered. You may be responsible for the cost of this type of visit if insurance coverage is denied.  The typical cost is $30 (10min), $59(11-20min) and $85 (21-30min).  Most often these visits are shorter than 10 minutes.    If during the course of the call the physician/provider feels a telephone visit is not appropriate, you will not be charged for this service. \"     Consent has been obtained for this service by 1 care team member:yes. See the scanned image in the medical record.    Preferred patient phone number to be used for this call: [phone]    Karla Gordon complains of  F/u from ER visit- wanting to talk about alcohol cessation    Past Medical History:   Diagnosis Date     Alcohol use disorder, mild, abuse 11/19/2018     Dietary folate deficiency anemia 11/20/2018     Fatty liver 3/22/2018    3/21/18:  ALT 76,  (4.2 xULN).   4/2/18:  Hepatitis B and C negative  Patient also with thrombocytopenia and mild macrocytic anemia  11/16/18:  IMPRESSION:     1. Increased hepatic echogenicity as can be seen in intrinsic parenchymal disease such as steatosis. 2. Liver is enlarged, measuring 21.6 cm. 3. Spleen is borderline enlarged, measuring 1.9 cm     Macrocytic anemia " 4/3/2018     Thrombocytopenia (H) 3/22/2018    3/21/18:  Platelets noted at 92.   4/2/18:  Platelets 92.  Mild macrocytic anemia.  Peripheral smear pending, consider Hematology referral     Tobacco use disorder 3/21/2018      Social History     Socioeconomic History     Marital status:      Spouse name: Not on file     Number of children: Not on file     Years of education: Not on file     Highest education level: Not on file   Social Needs     Financial resource strain: Not on file     Food insecurity - worry: Not on file     Food insecurity - inability: Not on file     Transportation needs - medical: Not on file     Transportation needs - non-medical: Not on file   Occupational History     Not on file   Tobacco Use     Smoking status: Current Every Day Smoker     Packs/day: 0.50     Years: 7.00     Pack years: 3.50     Types: Cigarettes     Smokeless tobacco: Current User   Substance and Sexual Activity     Alcohol use: Yes     Comment: daily, few drinks a day     Drug use: No     Sexual activity: Yes     Partners: Male     Birth control/protection: Condom   Other Topics Concern     Parent/sibling w/ CABG, MI or angioplasty before 65F 55M? Not Asked   Social History Narrative     Not on file     ALLERGIES  Spinach; Lexapro [escitalopram]; and Sertraline      Mikaela Baig MA   (MA signature)    Additional provider notes:  Telephone visit from 8:28 am-8:43 am    Patient had gone to the ED and was found to have a blood alcohol level of 0.38.  She states she wants to cut back on drinking with the goal of complete cessation.  Her last drink was last night- reportedly 2 shots.  She is feeling uncomfortable, feeling twitchy, feeling anxious.  She is pushing fluids to stay hydrated.  She is having some tremors of the left hand.  She denies any seizures.  She has no working car currently, so transportation is an issue which is why she did a telephone visit today.  She states the ED doctor told her she could get a  prescription to help reduce the alcohol withdrawal symptoms.  She states she has a contact number for a counselor and she plans to call to start therapy.    In regards to her h/o macrocytic anemia, the Hematology appointment 12/10/18 was cancelled and she has not rescheduled this yet.  I reviewed that the peripheral smear from the ED was abnormal and I recommend she gets calls to reschedule the Hematology appointment and I have her the phone number for this.    Assessment/Plan:    (F10.20) Alcohol use disorder, moderate, dependence (H)  (primary encounter diagnosis)    Plan: naltrexone (DEPADE/REVIA) 50 MG tablet daily.  Reviewed potential side effects of nausea, headache, dizziness that may improve with continued use        LORazepam (ATIVAN) 0.5 MG tablet as needed for withdrawal symptoms.  Discussed to not use Ativan if she is drinking due to risk of decreased respiratory drive and patient verbalized understanding.  Discussed with patient the indication and use of medication(s), risks/benefits, and potential adverse side effects.  Patient/guardian verbalized understanding and agreement with the plan.  - Encouraged patient to call to schedule with therapy.  I briefly mentioned [facility] is also a resource for her for therapy options.        Patient to follow up in 1-2 weeks with me via office visit or telephone visit for recheck.  Need to continue discussing therapy and chem dep referral at next visit.    (D53.9) Macrocytic anemia  Plan: ONC/HEME ADULT REFERRAL, vitamin B-12         (CYANOCOBALAMIN) 100 MCG tablet    (D52.0) Dietary folate deficiency anemia  Plan: ONC/HEME ADULT REFERRAL, folic acid (FOLVITE) 1        MG tablet    (D69.6) Thrombocytopenia (H)  Plan: ONC/HEME ADULT REFERRAL    Transportation Problems  - Discussed care coordination services at this clinic and recommended this due to transportation concerns.    Total time spent on this phone visit with the patient = 15 minutes     I have  reviewed the note as documented above.  This accurately captures the substance of my conversation with the patient,    Millie Moore, CRISTI, APRN, CNP

## 2018-12-28 NOTE — PROGRESS NOTES
Clinic Care Coordination Contact 12/28/18  Care Team Conversations-SW    Referral received to assist the pt with CD resources and transportation. SW made a phone call to the pt and introduced self and role and the purpose of my call.     Pt has [employer] [place] which will likley provide transportation to and from all medical/dental apt's. They can also offer mileage reimbursement to her. I encouraged her to call the number on the back of her insurance card to inquire this service.    Discussion about obtaining a chemical health evaluation and than following their recommendations for treatment. Pt is to call the customer service number on her insurance card for this.     Pt provided this writer with verbal permission to send her this information in a non secure email. She agrees to call this writer in the future should she have any questions/concerns.    Sully Thomson [facility] who is covering for Jyoti Aponte, [facility]  Care Coordinator Social Work    [facility] Slick Salguero and Sandra  [phone]  12/28/2018 1:02 PM

## 2019-01-16 ENCOUNTER — TELEPHONE (OUTPATIENT)
Dept: FAMILY MEDICINE | Facility: CLINIC | Age: 29
End: 2019-01-16

## 2019-01-16 PROBLEM — K70.11 ALCOHOLIC HEPATITIS WITH ASCITES (H): Status: ACTIVE | Noted: 2019-01-13

## 2019-01-16 PROBLEM — D69.6 THROMBOCYTOPENIA (H): Status: ACTIVE | Noted: 2018-03-22

## 2019-01-16 PROBLEM — R91.8 OPACITY OF LUNG ON IMAGING STUDY: Status: ACTIVE | Noted: 2019-01-16

## 2019-01-16 PROBLEM — K76.9 LIVER LESION: Status: ACTIVE | Noted: 2019-01-13

## 2019-01-16 NOTE — TELEPHONE ENCOUNTER
I received ED report from [facility] for:  Lower extremity edema (Primary Dx);   Lymphadenopathy;   Alcoholic hepatitis with ascites;   Elevated INR;   Urinary tract infection without hematuria, site unspecified    Please call patient to follow up on her ED visit to see how she is feeling and to offer to schedule a f/u visit.    Millie Moore, DNP, APRN, CNP

## 2019-01-17 NOTE — TELEPHONE ENCOUNTER
"  ED for acute condition Discharge Protocol    \"Hi, my name is Melodie Sotelo, a registered nurse, and I am calling from [facility].  I am calling to follow up and see how things are going for you after your recent emergency visit.\"    Tell me how you are doing now that you are home?\" doing ok       Discharge Instructions    \"Let's review your discharge instructions.  What is/are the follow-up recommendations?  Pt. Response: see provider     \"Has an appointment with your primary care provider been scheduled?\"  Yes. (confirm and remind to bring meds)    Medications    \"Tell me what changed about your medicines when you discharged?\"    Water pills and antibiotic kephlex    \"What questions do you have about your medications?\"   None        Call Summary    \"What questions or concerns do you have about your recent visit and your follow-up care?\"     none    \"If you have questions or things don't continue to improve, we encourage you contact us through the main clinic number (give number).  Even if the clinic is not open, triage nurses are available 24/7 to help you.     We would like you to know that our clinic has extended hours (provide information).  We also have urgent care (provide details on closest location and hours/contact info)\"    \"Thank you for your time and take care!\"      Melodie Sotelo,Clinic Rn  [facility]            "

## 2019-01-18 ENCOUNTER — OFFICE VISIT (OUTPATIENT)
Dept: FAMILY MEDICINE | Facility: CLINIC | Age: 29
End: 2019-01-18
Payer: COMMERCIAL

## 2019-01-18 VITALS
TEMPERATURE: 98.5 F | BODY MASS INDEX: 33.68 KG/M2 | HEART RATE: 104 BPM | SYSTOLIC BLOOD PRESSURE: 128 MMHG | DIASTOLIC BLOOD PRESSURE: 60 MMHG | WEIGHT: 209.6 LBS | HEIGHT: 66 IN | OXYGEN SATURATION: 98 % | RESPIRATION RATE: 25 BRPM

## 2019-01-18 DIAGNOSIS — M79.89 LEG SWELLING: ICD-10-CM

## 2019-01-18 DIAGNOSIS — R01.1 NEWLY RECOGNIZED MURMUR: ICD-10-CM

## 2019-01-18 DIAGNOSIS — R59.1 LYMPHADENOPATHY: ICD-10-CM

## 2019-01-18 DIAGNOSIS — R91.8 OPACITY OF LUNG ON IMAGING STUDY: ICD-10-CM

## 2019-01-18 DIAGNOSIS — D53.9 MACROCYTIC ANEMIA: ICD-10-CM

## 2019-01-18 DIAGNOSIS — F10.20 ALCOHOL USE DISORDER, MODERATE, DEPENDENCE (H): ICD-10-CM

## 2019-01-18 DIAGNOSIS — D69.6 THROMBOCYTOPENIA (H): ICD-10-CM

## 2019-01-18 DIAGNOSIS — K70.11 ALCOHOLIC HEPATITIS WITH ASCITES (H): Primary | ICD-10-CM

## 2019-01-18 PROCEDURE — 99214 OFFICE O/P EST MOD 30 MIN: CPT | Performed by: NURSE PRACTITIONER

## 2019-01-18 RX ORDER — CEPHALEXIN 500 MG/1
500 CAPSULE ORAL 2 TIMES DAILY
COMMUNITY
Start: 2019-01-13 | End: 2019-05-06 | Stop reason: ALTCHOICE

## 2019-01-18 RX ORDER — FUROSEMIDE 40 MG
1 TABLET ORAL DAILY
Refills: 0 | COMMUNITY
Start: 2019-01-14 | End: 2019-05-06

## 2019-01-18 RX ORDER — HYDROXYZINE HYDROCHLORIDE 25 MG/1
1 TABLET, FILM COATED ORAL PRN
Refills: 0 | COMMUNITY
Start: 2018-09-18 | End: 2019-05-06

## 2019-01-18 ASSESSMENT — MIFFLIN-ST. JEOR: SCORE: 1687.49

## 2019-01-18 ASSESSMENT — PAIN SCALES - GENERAL: PAINLEVEL: EXTREME PAIN (8)

## 2019-01-18 NOTE — PROGRESS NOTES
"  SUBJECTIVE:   Karla Gordon is a 28 year old female who presents to clinic today for the following health issues:      ED/UC Followup:    Facility:  [facility]  Date of visit: 1/13/2019  Reason for visit: bloating and weight gain  Current Status: not better, meds given not much help (Lasix)     12/30/18 was her last alcoholic drink.  Hurts in the abdomen due to swelling.  She never went to Hematology for the macrocytic anemia as discussed at her last visit.    ED report:  \"HISTORY OF PRESENT ILLNESS   Karla Gordon is a 28 y.o. female with a relevant past history of alcoholism (per patient report) who presents to the ED for evaluation of bloating and leg swelling.    The patient reports having \"severe bloating\" which began in her legs and feet a month ago and in her abdomen over the past 4-5 days. She notes that she has discomfort in her abdomen due to stretching/feeling tight, but no pain. She is unsure of her current weight, but she recalls that her weight prior to this incident was around 180 lbs and that he pants are not fitting correctly due to bloating. She denies having any changes in skin color, abdominal pain, and leg pain.    She states that she has not had any recent abdominal imaging; has no history of blood clots, heart issues, or liver issues; and has not had any recent surgeries. She denies chest pain, shortness of breathing, nausea, vomtiing, hematemesis, history of varices, diarrhea, parker, pnd or cardiac history. No recent medication changes.    Of note, she recalls being told that she had small abrasions on her liver around 11/25/2018 (1.5 months ago) after a visit to the ED. Patient acknowledges significant alcohol use in the past but recently quit drinking ~2 weeks ago due to \"witnessing too many family members die from alcohol\".    Medication List     START taking these medications   cephalexin 500 MG capsule  Commonly known as: KEFLEX  Take 1 capsule (500 mg " total) by mouth 2 (two) times a day for 7 days.    furosemide 40 MG tablet  Commonly known as: LASIX  Take 1 tablet (40 mg total) by mouth daily.      VITALS: /69  Pulse (!) 101  Temp 99  F (37.2  C)  Resp 20  Wt 180 lb (81.6 kg)  LMP 12/13/2018  SpO2 95% .     GENERAL: Alert. Patient demonstrates no severe distress.   EYES: Pupils equal, round, and reactive to light. EOMI. Sclera are jaundice bilaterally.   HEAD, EARS, NOSE, MOUTH, AND THROAT: Atraumatic, normocephalic. Posterior pharynx non erythematous and without exudates. Uvula midline. Bilateral TMs pearly gray and without signs of erythema, bulging or fluid. Oral mucous membranes moist. No tenderness to palpation of frontal or maxillary sinuses.   NECK: Neck is supple.   RESPIRATORY: Lung sounds clear to auscultation bilaterally. No respiratory distress. No increased work of breathing or use of accessory muscles.   CARDIOVASCULAR: Heart rate: within normal limits. Regular rhythm. No murmur heard. Peripheral pulses (radial and posterior tibialis) present and equal bilaterally.   ABDOMEN: Abdomen soft but notably distended. No significant tenderness to palpation. No rebound tenderness or guarding noted. Negative for CVA tenderness.   MUSCULOSKELETAL: No signs of acute injury or deformity. Patient has 3+ pitting edema in bilaterally lower extremities extending from feet to knees.   SKIN: Skin color is notably jaundice. No significant skin lesions or rash appreciated.   NEUROLOGIC: No gross neurological deficit appreciated on exam. Sensorimotor function normal, symmetric in UE/LE bilaterally. GCS 15.   PSYCHIATRIC: The patient was alert and appropriate.     INTERVENTIONS     Medications   iohexol 350 mg iodine/mL injection 100 mL (OMNIPAQUE) (100 mL Intravenous Given 1/13/19 7501)     DIAGNOSTICS   LABORATORY ASSESSMENT (REVIEWED AND INTERPRETED):  Labs Reviewed   URINALYSIS,MACRO REFLEX MICRO, UC IF INDICATED - Abnormal; Notable for the following  components:   Result Value   Clarity, UA Cloudy (*)   Bilirubin, UA Small (*)   Leukocytes, UA Moderate (*)   Bacteria, UA Many (*)   WBC, UA 25-50 (*)   Squam Epithel, UA >100 (*)   WBC Clumps Present (*)   Mucus, UA Few (*)   All other components within normal limits   Narrative:   Urine Culture ordered based on [facility] criteria   HM2(CBC W/O DIFFERENTIAL) - Abnormal; Notable for the following components:   WBC 17.3 (*)   RBC 3.17 (*)   Hemoglobin 10.4 (*)   Hematocrit 31.8 (*)   RDW 18.8 (*)   All other components within normal limits   COMPREHENSIVE METABOLIC PANEL - Abnormal; Notable for the following components:   Sodium 135 (*)   Potassium 3.3 (*)   BUN 4 (*)   Creatinine 0.56 (*)   Bilirubin, Total 4.5 (*)   Calcium 7.7 (*)   Albumin 1.9 (*)   Alkaline Phosphatase 195 (*)   AST 81 (*)   All other components within normal limits   Narrative:   Fasting Glucose reference range is 70-99 mg/dL per  American Diabetes Association (ADA) guidelines.   LIPASE - Abnormal; Notable for the following components:   Lipase 67 (*)   All other components within normal limits   INR - Abnormal; Notable for the following components:   INR 1.46 (*)   All other components within normal limits   Narrative:   INR Therapeutic Ranges:  Mech. Valve 2.5-3.5  Post Surg. 2.0-3.0  DVT/PE 2.0-3.0   BNP(B-TYPE NATRIURETIC PEPTIDE) - Normal   TROPONIN I - Normal   POCT PREGNANCY, URINE - Normal   CULTURE, URINE     IMAGING STUDIES:     Result Date: 1/13/2019  XR CHEST 2 VIEWS 1/13/2019 5:44 PM INDICATION: Edema COMPARISON: 11/15/2018 FINDINGS: Heart size and pulmonary vascularity normal. Minimal atelectasis left base. Lungs otherwise clear. Right shoulder arthroplasty.    Us Venous Legs Bilateral    Result Date: 1/13/2019  US VENOUS LEGS BILATERAL 1/13/2019 6:01 PM INDICATION: Leg swelling TECHNIQUE: Routine exam with compression, augmentation, and duplex utilizing 2D gray-scale imaging, Doppler interrogation with  color-flow and spectral waveform analysis. COMPARISON: None. FINDINGS: The common femoral, femoral, popliteal, and segmentally visualized calf veins were evaluated. Right leg veins are negative for deep venous thrombosis. Left leg veins are negative for deep venous thrombosis. No popliteal cysts. Prominent right inguinal lymph node measuring 4.0 x 1.1 x 2.4 cm a prominent left inguinal node measuring 2.3 x 1.0 x 2.5 cm. Both of these are morphologically normal and likely reactive. Is bilateral subcutaneous edema.     CONCLUSION: 1. Bilateral leg veins are negative for DVT.    Ct Abdomen Pelvis Without Oral With Iv Contrast    Result Date: 1/13/2019  CT ABDOMEN PELVIS WO ORAL W IV CONTRAST 1/13/2019 5:34 PM INDICATION: Abdominal bloating, Ascites? ascites TECHNIQUE: CT abdomen and pelvis. Multiplanar reformation images (MPR). Dose reduction techniques were used. IV CONTRAST: Iohexol (Omni) 75mL COMPARISON: None. FINDINGS: LUNG BASES: 6 mm right middle lobe nodular groundglass opacity image 2 series 3. Mild lower paraesophageal adenopathy with a dominant node measuring 8 mm short axis dimension. Small left cardiophrenic recess nodes with the largest measuring 6 mm short axis dimension. ABDOMEN: Diffuse hepatic steatosis. 6 mm too small to characterize hypodense lesion in the liver dome image 38 series 4. Nonspecific gallbladder wall thickening. No biliary ductal dilatation. Borderline enlarged spleen measuring 13.2 cm in craniocaudal dimension. The pancreas is normal in size. No dilatation of the main pancreatic duct. Unremarkable adrenals and kidneys. No evidence of hydronephrosis. The stomach is not well distended but appears grossly unremarkable. Mild mural prominence of the descending and transverse segments of the duodenum likely related to underdistention. Similar jejunal wall prominence. Mild diffuse colonic wall thickening with stratified enhancement which may reflect submucosal fat deposition or submucosal  edema. Small amount of diffuse simple ascites. Upper abdominal, mesenteric and retroperitoneal adenopathy. Dominant peripancreatic node measures 1.5 x 2.7 cm image 114 series 4. A dominant central mesenteric node measures 1.4 x 1.5 cm image 191. A recanalized umbilical vein is noted. Diffuse body wall edema. No free air. PELVIS: Unremarkable pelvic organs. Moderate simple pelvic free fluid. MUSCULOSKELETAL: Negative.     CONCLUSION: 1. Severe hepatic steatosis as well as findings consistent with portal venous hypertension including a small amount of diffuse simple ascites, borderline splenomegaly and recanalization of the umbilical vein. 2. Proximal small bowel and long segment colonic wall thickening which may be related to edema secondary to the patient's liver disease. However, a nonspecific enterocolitis cannot be excluded and clinical correlation is recommended. 3. Lower paraesophageal, abdominal, mesenteric and retroperitoneal adenopathy which is favored to be reactive. As a precaution, follow-up is recommended. 4. Diffuse body wall edema. 5. 6 mm right middle lobe nodular groundglass opacity. Recommend 6 month follow-up. 6. Too small to characterize liver dome lesion for which attention on follow-up is recommended.    IMPRESSION AND PLAN   MDM:   Karla Gordon is a 28-year-old female with past medical history notable for alcohol use disorder, dietary folate deficiency, fatty liver, microcytic anemia, foot paresthesias, tobacco use, thrombocytopenia who presents emergency department today for evaluation of abdominal and bilateral leg swelling. Patient reports recently discontinuing alcohol use approximately 2 weeks ago due to concern that is killed multiple of her family members. Patient does not have a diagnosed history of cirrhosis or ascites, but this was the primary concern for her symptoms given her history.     Patient's total bilirubin is 4.5 consistent with her jaundice findings. Alk phos 195 and  "AST 81. ALT WNL. INR also slightly elevated at 1.46. Creatinine WNL at 0.56. CT showed severe hepatic steatosis without evidence for cirrhosis although there is a small amount of simple ascites present, not enough that would likely benefit from paracentesis at this time.    Alternative causes of edema were explored including cardiac and clot. LE US negative for DVT. Patient has no shortness of breath or chest pain to suggest heart failure. Troponin negative. BNP WNL. CXR does not show pulmonary congestion.     Urine is suggestive of infection with Moderate leukocytes, many bacteria, WBC clumps and 25-50 WBCs although there is significant contamination with >100 epithelial cells as well. WIll send for culture and start on Keflex.    Patient does have a leukocytosis of 17, diffuse abdominal and upper thigh lymphadenopathy and right middle lobe nodule that was thought to be reactive in nature based on CT. Due to these findings without clear etiology, patient was offered admission to the hospital for further monitoring. However, the patient reports that she overall feels well and prefers to go home. Did discuss these abnormal findings and that she should return immediately if she develops abdominal pain, fever or other new concerning symptoms. No abdominal pain or fever present currently to suggest SBP or need for tap. Did recommend repeat imaging managed by primary care to ensure lymphadenopathy is resolving.     IMPRESSION: Significant abdominal and LE edema of unclear etiology. Patient declined admission. Normal vital signs and non toxic appearing. Will be started on lasix 40mg qday. Recommend close pcp follow up in 48 hours for repeat lab testing and establishing care with GI for chronic liver disease management.\"    Problem list and histories reviewed & adjusted, as indicated.  Additional history: as documented    Patient Active Problem List   Diagnosis     Numbness and tingling of foot     Tinnitus, bilateral     " Plugged feeling in ear, bilateral     Tobacco use disorder     Thrombocytopenia (H)     Fatty liver     Macrocytic anemia     Alcohol use disorder, moderate, dependence (H)     Dietary folate deficiency anemia     Alcoholic hepatitis with ascites     Liver lesion     Opacity of lung on imaging study     Past Surgical History:   Procedure Laterality Date     C ANESTH,SHOULDER REPLACEMENT Right 2015    right partial shoulder replacement at age 25     HC TOOTH EXTRACTION W/FORCEP  2015       Social History     Tobacco Use     Smoking status: Current Every Day Smoker     Packs/day: 0.50     Years: 7.00     Pack years: 3.50     Types: Cigarettes     Smokeless tobacco: Current User   Substance Use Topics     Alcohol use: Yes     Comment: daily, few drinks a day     Family History   Problem Relation Age of Onset     Cancer Mother 62        bladder cancer     Hypertension Mother      Psoriasis Father      No Known Problems Brother      No Known Problems Sister      No Known Problems Son      Diabetes No family hx of      Hyperlipidemia No family hx of      Breast Cancer No family hx of      Colon Cancer No family hx of          Current Outpatient Medications   Medication Sig Dispense Refill     cephALEXin (KEFLEX) 500 MG capsule Take 500 mg by mouth 2 times daily       diphenhydrAMINE-acetaminophen (TYLENOL PM)  MG tablet Take 1 tablet by mouth nightly as needed for sleep       folic acid (FOLVITE) 1 MG tablet Take 5 tablets (5 mg) by mouth daily 150 tablet 1     furosemide (LASIX) 40 MG tablet 1 tablet daily  0     hydrOXYzine (ATARAX) 25 MG tablet 1 tablet as needed  0     Multiple Vitamins-Minerals (CENTRUM ULTRA WOMENS PO) Take 1 tablet by mouth daily       naltrexone (DEPADE/REVIA) 50 MG tablet Take 1 tablet (50 mg) by mouth daily 30 tablet 1     vitamin B-12 (CYANOCOBALAMIN) 100 MCG tablet Take 1 tablet (100 mcg) by mouth daily 30 tablet 1     Allergies   Allergen Reactions     Lexapro [Escitalopram] Nausea      "Severe nausea; does not wish to take again     Sertraline Nausea     Severe nausea; does not wish to take again     Spinach      Mouth gets numb     BP Readings from Last 3 Encounters:   01/18/19 128/60   12/25/18 110/74   11/19/18 110/68    Wt Readings from Last 3 Encounters:   01/18/19 95.1 kg (209 lb 9.6 oz)   12/25/18 82.6 kg (182 lb 1.6 oz)   11/19/18 84.4 kg (186 lb)             Reviewed and updated as needed this visit by clinical staff  Tobacco  Allergies  Meds  Problems  Med Hx  Surg Hx  Fam Hx  Soc Hx        Reviewed and updated as needed this visit by Provider  Tobacco  Allergies  Meds  Problems  Med Hx  Surg Hx  Fam Hx         ROS:  Constitutional, HEENT, cardiovascular, pulmonary, gi and gu systems are negative, except as otherwise noted.    OBJECTIVE:     /60 (BP Location: Right arm, Patient Position: Chair, Cuff Size: Adult Large)   Pulse 104   Temp 98.5  F (36.9  C) (Oral)   Resp 25   Ht 1.676 m (5' 6\")   Wt 95.1 kg (209 lb 9.6 oz)   SpO2 98%   BMI 33.83 kg/m    Body mass index is 33.83 kg/m .  GENERAL: healthy, alert and no distress  EYES: sclera with jaundice bilateral  RESP: lungs clear to auscultation - no rales, rhonchi or wheezes  CV: regular rates and rhythm, normal S1 S2, no S3 or S4, no murmur, click or rub, grade 2/6 murmur, peripheral pulses strong and lower extremity edema bilateral  ABDOMEN: distended, round, edema  PSYCH: mentation appears normal, affect normal/bright      ASSESSMENT/PLAN:     1. Alcoholic hepatitis with ascites    - GASTROENTEROLOGY ADULT REF CONSULT ONLY    2. Leg swelling  Due to alcoholic hepatitis.  Refer to GI.    3. Alcohol use disorder, moderate, dependence (H)  Last drink 12/30/19.  Recommend referral to chemical dependency and patient declines, stating she has a counselor who she will contact for this.    4. Newly recognized murmur    - Echocardiogram Complete; Future    5. Macrocytic anemia  Patient never followed up with Hematology " and I stressed the importance of this.  - ONC/HEME ADULT REFERRAL    6. Thrombocytopenia (H)  Likely due to liver disease.  - GASTROENTEROLOGY ADULT REF CONSULT ONLY    7. Opacity of lung on imaging study  - Follow-up CT in 6 months.    8. Lymphadenopathy  - Follow-up CT in 6 months.      Return in about 4 weeks (around 2/15/2019) for Recheck/Follow-up of multiple illnesses.    Millie Moore, NP  [facility]

## 2019-01-18 NOTE — PATIENT INSTRUCTIONS
1) Liver doctor appointment as soon as possible    2) Blood doctor appointment    3) Ultrasound of your heart

## 2019-01-18 NOTE — PROGRESS NOTES
[facility]   [address], [address] [place], [place] [address]   [place]   Phone: [phone]     Appointment scheduled 01/18/19 at 2:40pm with Dr. Marino

## 2019-01-20 PROBLEM — R59.1 LYMPHADENOPATHY: Status: ACTIVE | Noted: 2019-01-20

## 2019-01-20 PROBLEM — F10.20 ALCOHOL USE DISORDER, MODERATE, DEPENDENCE (H): Status: ACTIVE | Noted: 2018-11-19

## 2019-01-20 PROBLEM — R59.1 LYMPHADENOPATHY: Status: ACTIVE | Noted: 2019-01-13

## 2019-01-23 ENCOUNTER — TRANSFERRED RECORDS (OUTPATIENT)
Dept: HEALTH INFORMATION MANAGEMENT | Facility: CLINIC | Age: 29
End: 2019-01-23

## 2019-01-23 LAB
HEP C HIM: NORMAL
INR PPP: 1.3 (ref 0.9–1.1)

## 2019-01-24 ENCOUNTER — TRANSFERRED RECORDS (OUTPATIENT)
Dept: HEALTH INFORMATION MANAGEMENT | Facility: CLINIC | Age: 29
End: 2019-01-24

## 2019-02-08 ENCOUNTER — ANCILLARY PROCEDURE (OUTPATIENT)
Dept: CARDIOLOGY | Facility: CLINIC | Age: 29
End: 2019-02-08
Payer: COMMERCIAL

## 2019-02-08 DIAGNOSIS — R01.1 NEWLY RECOGNIZED MURMUR: ICD-10-CM

## 2019-02-08 PROCEDURE — 93306 TTE W/DOPPLER COMPLETE: CPT | Mod: GC | Performed by: INTERNAL MEDICINE

## 2019-02-09 PROBLEM — I34.0 MILD MITRAL REGURGITATION BY PRIOR ECHOCARDIOGRAM: Status: ACTIVE | Noted: 2019-02-08

## 2019-02-11 ENCOUNTER — TRANSFERRED RECORDS (OUTPATIENT)
Dept: HEALTH INFORMATION MANAGEMENT | Facility: CLINIC | Age: 29
End: 2019-02-11

## 2019-02-13 ENCOUNTER — TELEPHONE (OUTPATIENT)
Dept: FAMILY MEDICINE | Facility: CLINIC | Age: 29
End: 2019-02-13

## 2019-02-14 NOTE — TELEPHONE ENCOUNTER
I called and spoke with patient about echocardiogram results showing mild mitral regurgitation.  Recommend follow up echo in 1 year.  All questions answered and patient verbalized understanding.    iMllie Moore, DNP, APRN, CNP

## 2019-02-21 ENCOUNTER — TRANSFERRED RECORDS (OUTPATIENT)
Dept: HEALTH INFORMATION MANAGEMENT | Facility: CLINIC | Age: 29
End: 2019-02-21

## 2019-03-05 ENCOUNTER — TELEPHONE (OUTPATIENT)
Dept: OPHTHALMOLOGY | Facility: CLINIC | Age: 29
End: 2019-03-05

## 2019-03-05 NOTE — TELEPHONE ENCOUNTER
Non-urgent referral to neuro-ophthalmology per referral note from referring clinic    Note faxed to referral team to assist in scheduling    Ok to schedule with dr. Minor/bel Mccartney RN 3:39 PM 03/05/19        [facility] Call Center    Phone Message    May a detailed message be left on voicemail: yes    Reason for Call: Other: Patient is being referred by Dr. Franklin Hinojosa from [facility] in [place] to be seen with Neuro Opthalmology for  visual distrubance; 'formal VF testing today does reveal some VF losses OU with some respect for the vertical midline- possible bitemporal VF defect with concern for a CNS Mass (such as pituitary tumor) or other CNS etiology'. Referral and medical records will be faxed to the clinic, please contact the patient directly for scheduling.     Action Taken: Message routed to:  [facility]

## 2019-05-06 ENCOUNTER — OFFICE VISIT (OUTPATIENT)
Dept: FAMILY MEDICINE | Facility: CLINIC | Age: 29
End: 2019-05-06
Payer: COMMERCIAL

## 2019-05-06 VITALS
DIASTOLIC BLOOD PRESSURE: 70 MMHG | WEIGHT: 198.6 LBS | HEART RATE: 76 BPM | HEIGHT: 66 IN | TEMPERATURE: 98.5 F | SYSTOLIC BLOOD PRESSURE: 120 MMHG | BODY MASS INDEX: 31.92 KG/M2

## 2019-05-06 DIAGNOSIS — Z32.01 POSITIVE URINE PREGNANCY TEST: Primary | ICD-10-CM

## 2019-05-06 DIAGNOSIS — D52.0 DIETARY FOLATE DEFICIENCY ANEMIA: ICD-10-CM

## 2019-05-06 LAB — HCG UR QL: POSITIVE

## 2019-05-06 PROCEDURE — 99213 OFFICE O/P EST LOW 20 MIN: CPT | Performed by: NURSE PRACTITIONER

## 2019-05-06 PROCEDURE — 81025 URINE PREGNANCY TEST: CPT | Performed by: NURSE PRACTITIONER

## 2019-05-06 ASSESSMENT — MIFFLIN-ST. JEOR: SCORE: 1637.59

## 2019-05-06 NOTE — PROGRESS NOTES
SUBJECTIVE:   Karla Gordon is a 28 year old female who presents to clinic today accompanied by her  for the following   health issues:      Patient here for pregnancy confirmation-- had 2 positives at home last week -- this will be 2nd child.  She and her  are pleased about this pregnancy.    Has one three year old son whom she delivered at 38 weeks (3 days early).  Vaginal delivery.  This is her second pregnancy.  LMP around 4/1/19.    Is taking Prenatal vitamin with folate, [employer] brand.  She is having breast tenderness, fatigue, increased appetite, and nausea.  No vomiting.    She has history of alcoholic hepatitis with ascites and is followed by [facility].  Her last drink was 12/30/2018 and continues to abstain from alcohol.    Additional history: as documented    Reviewed  and updated as needed this visit by clinical staff  Tobacco  Allergies  Meds  Med Hx  Surg Hx  Fam Hx  Soc Hx        Reviewed and updated as needed this visit by Provider         Patient Active Problem List   Diagnosis     Numbness and tingling of foot     Tinnitus, bilateral     Plugged feeling in ear, bilateral     Tobacco use disorder     Thrombocytopenia (H)     Fatty liver     Macrocytic anemia     Alcohol use disorder, moderate, dependence (H)     Dietary folate deficiency anemia     Alcoholic hepatitis with ascites     Liver lesion     Opacity of lung on imaging study     Lymphadenopathy     Mild mitral regurgitation by prior echocardiogram     Past Surgical History:   Procedure Laterality Date     ABDOMINAL PARACENTESIS  01/24/2019    US Paracentesis with Imaging at [facility].  Fluid removed: 5910 mL     C ANESTH,SHOULDER REPLACEMENT Right 2015    right partial shoulder replacement at age 25     HC TOOTH EXTRACTION W/FORCEP  2015       Social History     Tobacco Use     Smoking status: Current Every Day Smoker     Packs/day: 0.50     Years: 7.00     Pack years: 3.50     Types: Cigarettes      "Smokeless tobacco: Current User   Substance Use Topics     Alcohol use: Yes     Comment: daily, few drinks a day     Family History   Problem Relation Age of Onset     Cancer Mother 62        bladder cancer     Hypertension Mother      Psoriasis Father      No Known Problems Brother      No Known Problems Sister      No Known Problems Son      Diabetes No family hx of      Hyperlipidemia No family hx of      Breast Cancer No family hx of      Colon Cancer No family hx of          Current Outpatient Medications   Medication Sig Dispense Refill     BIOTIN PO Take 1 tablet by mouth daily       diphenhydrAMINE-acetaminophen (TYLENOL PM)  MG tablet Take 1 tablet by mouth nightly as needed for sleep       Multiple Vitamins-Minerals (CENTRUM ULTRA WOMENS PO) Take 1 tablet by mouth daily       Prenatal Vit-Fe Fumarate-FA (PRENATAL VITAMIN PO)        Allergies   Allergen Reactions     Lexapro [Escitalopram] Nausea     Severe nausea; does not wish to take again     Sertraline Nausea     Severe nausea; does not wish to take again     Spinach      Mouth gets numb     BP Readings from Last 3 Encounters:   05/06/19 120/70   01/18/19 128/60   12/25/18 110/74    Wt Readings from Last 3 Encounters:   05/06/19 90.1 kg (198 lb 9.6 oz)   01/18/19 95.1 kg (209 lb 9.6 oz)   12/25/18 82.6 kg (182 lb 1.6 oz)                    ROS:  Constitutional, HEENT, cardiovascular, pulmonary, gi and gu systems are negative, except as otherwise noted.    OBJECTIVE:     /70 (BP Location: Right arm, Patient Position: Sitting, Cuff Size: Adult Large)   Pulse 76   Temp 98.5  F (36.9  C) (Oral)   Ht 1.676 m (5' 6\")   Wt 90.1 kg (198 lb 9.6 oz)   LMP 04/08/2019   BMI 32.05 kg/m    Body mass index is 32.05 kg/m .  GENERAL: healthy, alert, no distress and over weight  RESP: lungs clear to auscultation - no rales, rhonchi or wheezes  CV: regular rate and rhythm, normal S1 S2, no S3 or S4, no murmur, click or rub and peripheral pulses " strong  ABDOMEN: soft, nontender, no hepatosplenomegaly, no masses and bowel sounds normal  PSYCH: mentation appears normal, affect normal/bright    Diagnostic Test Results:  Results for orders placed or performed in visit on 05/06/19   HCG qualitative urine - [facility] and Range   Result Value Ref Range    HCG Qual Urine Positive (A) NEG^Negative       ASSESSMENT/PLAN:     1. Positive urine pregnancy test    - HCG qualitative urine - [facility] and Range  - Patient to continue daily Prenatal vitamin.  Discussed avoiding alcohol, healthy nutrition, staying active.  - Patient would like to establish care with OB/GYN at the [facility] and I reviewed the available providers she can see.    History of dietary folate deficiency anemia  - Future order for folic acid    Return in about 1 week (around 5/13/2019) for nurse visit with Melodie for OB ed.    Millie Moore, NP  [facility]

## 2019-05-06 NOTE — LETTER
[facility]  [address] [address]  [phone]      June 11, 2019      Karla Gordon  [address]  [place] [address]          Dear Karla Gordon:    This is to remind you that your provider wanted you to return to the clinic for lab testing.    If you are coming in for Lipids and/or Glucose testing please fast for 10-12 hours. Morning medications can be taken with water.    You may call our office at [phone] to schedule an appointment.    Please disregard this notice if you have already had your labs drawn or made an            appointment.        Sincerely,    Millie Moore NP

## 2019-05-16 ENCOUNTER — PRENATAL OFFICE VISIT (OUTPATIENT)
Dept: NURSING | Facility: CLINIC | Age: 29
End: 2019-05-16
Payer: COMMERCIAL

## 2019-05-16 VITALS
DIASTOLIC BLOOD PRESSURE: 60 MMHG | SYSTOLIC BLOOD PRESSURE: 110 MMHG | HEART RATE: 72 BPM | BODY MASS INDEX: 31.63 KG/M2 | WEIGHT: 196.8 LBS | HEIGHT: 66 IN

## 2019-05-16 DIAGNOSIS — Z34.80 PRENATAL CARE, SUBSEQUENT PREGNANCY, UNSPECIFIED TRIMESTER: Primary | ICD-10-CM

## 2019-05-16 LAB
ABO + RH BLD: NORMAL
ABO + RH BLD: NORMAL
ALBUMIN UR-MCNC: 100 MG/DL
APPEARANCE UR: CLEAR
BACTERIA #/AREA URNS HPF: ABNORMAL /HPF
BILIRUB UR QL STRIP: NEGATIVE
BLD GP AB SCN SERPL QL: NORMAL
BLOOD BANK CMNT PATIENT-IMP: NORMAL
COLOR UR AUTO: YELLOW
ERYTHROCYTE [DISTWIDTH] IN BLOOD BY AUTOMATED COUNT: 12.9 % (ref 10–15)
GLUCOSE UR STRIP-MCNC: NEGATIVE MG/DL
HCT VFR BLD AUTO: 38.3 % (ref 35–47)
HGB BLD-MCNC: 12.4 G/DL (ref 11.7–15.7)
HGB UR QL STRIP: ABNORMAL
KETONES UR STRIP-MCNC: NEGATIVE MG/DL
LEUKOCYTE ESTERASE UR QL STRIP: ABNORMAL
MCH RBC QN AUTO: 30.3 PG (ref 26.5–33)
MCHC RBC AUTO-ENTMCNC: 32.4 G/DL (ref 31.5–36.5)
MCV RBC AUTO: 94 FL (ref 78–100)
NITRATE UR QL: NEGATIVE
NON-SQ EPI CELLS #/AREA URNS LPF: ABNORMAL /LPF
PH UR STRIP: 6 PH (ref 5–7)
PLATELET # BLD AUTO: 206 10E9/L (ref 150–450)
RBC # BLD AUTO: 4.09 10E12/L (ref 3.8–5.2)
RBC #/AREA URNS AUTO: ABNORMAL /HPF
SOURCE: ABNORMAL
SP GR UR STRIP: 1.02 (ref 1–1.03)
SPECIMEN EXP DATE BLD: NORMAL
UROBILINOGEN UR STRIP-ACNC: 0.2 EU/DL (ref 0.2–1)
WBC # BLD AUTO: 11.5 10E9/L (ref 4–11)
WBC #/AREA URNS AUTO: ABNORMAL /HPF

## 2019-05-16 PROCEDURE — G0499 HEPB SCREEN HIGH RISK INDIV: HCPCS | Performed by: OBSTETRICS & GYNECOLOGY

## 2019-05-16 PROCEDURE — 87389 HIV-1 AG W/HIV-1&-2 AB AG IA: CPT | Performed by: OBSTETRICS & GYNECOLOGY

## 2019-05-16 PROCEDURE — 36415 COLL VENOUS BLD VENIPUNCTURE: CPT | Performed by: OBSTETRICS & GYNECOLOGY

## 2019-05-16 PROCEDURE — 85027 COMPLETE CBC AUTOMATED: CPT | Performed by: OBSTETRICS & GYNECOLOGY

## 2019-05-16 PROCEDURE — 86780 TREPONEMA PALLIDUM: CPT | Performed by: OBSTETRICS & GYNECOLOGY

## 2019-05-16 PROCEDURE — 87086 URINE CULTURE/COLONY COUNT: CPT | Performed by: OBSTETRICS & GYNECOLOGY

## 2019-05-16 PROCEDURE — 81001 URINALYSIS AUTO W/SCOPE: CPT | Performed by: OBSTETRICS & GYNECOLOGY

## 2019-05-16 PROCEDURE — 86901 BLOOD TYPING SEROLOGIC RH(D): CPT | Performed by: OBSTETRICS & GYNECOLOGY

## 2019-05-16 PROCEDURE — 86850 RBC ANTIBODY SCREEN: CPT | Performed by: OBSTETRICS & GYNECOLOGY

## 2019-05-16 PROCEDURE — 86900 BLOOD TYPING SEROLOGIC ABO: CPT | Performed by: OBSTETRICS & GYNECOLOGY

## 2019-05-16 PROCEDURE — 99207 ZZC NO CHARGE NURSE ONLY: CPT

## 2019-05-16 PROCEDURE — 86762 RUBELLA ANTIBODY: CPT | Performed by: OBSTETRICS & GYNECOLOGY

## 2019-05-16 ASSESSMENT — MIFFLIN-ST. JEOR: SCORE: 1629.43

## 2019-05-16 NOTE — PROGRESS NOTES
Prenatal OB Questionnaire  Past Medical History  Diabetes   No  Hypertension   No  Heart Disease, mitral valve prolapse, or rheumatic fever?   No  An autoimmune disorder such as Lupus or Rheumatoid Arthritis?   No  Kidney Disease or Urinary Tract Infection?   No  Epilepsy, seizures or spells?   No  Migraine headaches?   No  A stroke or loss of function or sensation?   No  Any other neurological problems?   No  Have you ever been treated for depression?  Yes  Are you having problems with crying spells or loss of self-esteem?   Yes not treated with medication   Have you ever required psychiatric care?   No  Have you ever hepatitis, liver disease or jaundice?   Has had inflamed liver  Due to alcohol has been sober for 5 months  Have you ever been treated for blood clots in your veins, deep venous thrombosis, inflammation in the veins, thrombosis, phlebitis, pulmonary embolism or varicosities?   No  Have you had excessive bleeding after surgery or dental work?   yes  Do you bleed more than other women after a cut or scratch?   Yes  Do you have a history of anemia?   Yes treated with iron  Have you ever been treated for thyroid problems or taken thyroid medication?  No  Do you have any other endocrine problems?  No  Have you ever been in a major accident or suffered serious trauma?   No  Within the last year, has anyone hit slapped, kicked or otherwise hurt you?  No  In the last year, has anyone forced you to have sex when you didn't want to?  No  Have you ever had a blood transfusion?   Yes with liver issues  Would you refuse a blood transfusion if a doctor judged it to be medically necessary?   No  If you answered yes, would you rather die than have a blood transfusion?   No  If you answered yes, is this for Synagogue reasons?   No  Does anyone in your home smoke?   Yes  Do you use tobacco products?  Yes  Do you drink beer, wine, hard liquor?  No  Do you use any of the following: marijuana, speed, cocaine, heroine,  hallucinogens, or other drugs?  No  Is your blood type Rh negative?   No  Have you ever had abnormal antibodies in your blood?   No  Have you ever had asthma?   Yes mostly as child  Have you ever had tuberculosis?   No  Do you have any allergies to drugs or over-the-counter medications?   No    Allergies as of 5/16/2019:    Allergies as of 05/16/2019 - Reviewed 05/16/2019   Allergen Reaction Noted     Lexapro [escitalopram] Nausea 11/16/2018     Sertraline Nausea 11/16/2018     Spinach  03/21/2018       Do you have any breast problems?   No  Have you ever ?   Yes for only 6 months  Have you had any gynecological surgical procedures such as cervical conization, a LEEP procedure, laser treatment, cryosurgery of the cervix, or a dilation and curettage, etc?  No  Have you had any other surgical procedures?  Yes shoulder surgery 2015  Have you been hospitalized for a nonsurgical reason excluding normal delivery?   No  Have you ever had any anesthetic complications?   No  Have you ever had an abnormal pap smear?   Yes normal now  Do you have a history of abnormalities of the uterus?   No  Did it take you more than one year to become pregnant?   No  Have you ever been evaluated or treated for infertility?   No  Is there a history of medical problems in your family, which you feel might adversely affect your health or pregnancy?   No  Do you have any other problems we have not asked you about which you feel may be important to this pregnancy?  No    Symptoms since Last Menstrual Period  Do you have any of the following:    *abdominal pain  No  *blood in stool or urine  No  *chest pain  No  *shortness of breath  No  *coughing or vomiting up blood No  *heart racing or skipping beats  No  *nausea and vomiting  Yes  *pain with urination  No  *vaginal discharge or bleeding  No  Current medications are:  Current Outpatient Medications   Medication Sig Dispense Refill     BIOTIN PO Take 1 tablet by mouth daily        Multiple Vitamins-Minerals (CENTRUM ULTRA WOMENS PO) Take 1 tablet by mouth daily       Prenatal Vit-Fe Fumarate-FA (PRENATAL VITAMIN PO)        diphenhydrAMINE-acetaminophen (TYLENOL PM)  MG tablet Take 1 tablet by mouth nightly as needed for sleep         Genetic Screening  At the time of birth, will you be 35 years old or older?  No  Has the patient, baby s father, or anyone in either family had:  Thalassemia (Italian, Greek, Mediterranean, or  background only) and an MCV result less than 80?  No  Neural tube defect such as meningomyelocele, spina bifida or anencephaly?  No  Congenital heart defect?  No  Down s syndrome?  No  Timothy-Sach s disease (Latter-day, Cajun, French-Sanders)?  No  Sickle cell disease or trait ([place])?  No  Hemophilia or other inherited problems of blood coagulation? No  Muscular dystrophy?  No  Cystic Fibrosis?  No  Glorieta s chorea?  No  Mental retardation/autism? No   If yes, was the person tested for fragile X?  No  Any other inherited genetic or chromosomal disorder?  No  Maternal metabolic disorder (e.g. insulin-dependent diabetes, PKU)? No  A child with birth defects not listed above?  No  Recurrent pregnancy loss or a stillbirth?  No  Has the patient had any medications/street drugs/alcohol since her last menstrual period? No  Does the patient or baby s father have any other genetic risks?  No  Infection History  Do you object to being tested for Hepatitis B? No  Do you object to being tested for HIV? No  Do you feel that you are at high risk for coming in contact with the AIDS virus?  No  Have you ever been treated for tuberculosis?  No  Have you ever received the BCG vaccine for tuberculosis?  No  Have you ever had a positive skin test for tuberculosis? No  Do you live with someone who has tuberculosis?  No  Have you ever been exposed to tuberculosis?  No  Do you have genital herpes?  No  Does your partner have genital herpes?  No  Have you had a rash or viral illness  since your last period?  No  Have you ever had Gonorrhea, Chlamydia, Syphilis, venereal warts, trichomoniasis, pelvic inflammatory disease or any other sexually transmitted disease?  No  Do you know if you are a genital group B streptococcus carrier? No  You have not had chicken pox/varicella  Yes has had  Have you been vaccinated against chicken pox?  Yes  Have you had any other infectious disease? No        Early ultrasound screening tool:    Does patient have irregular periods?  No  Did patient use hormonal birth control in the three months prior to positive urine pregnancy test? No  Is the patient breastfeeding?  No  Is the patient 10 weeks or greater at time of education visit?  No        Patient presents to clinic today prenatal education. This is the patient's 3 pregnancy. She has had 0 miscarriage(s), 1 (s), 1 term birth(s) and 0  birth(s). She has 1 living child(joellen).   This {was a planned pregnancy.  Reviewed answers to patient's Prenatal OB Questionnaire. Screening is positive for as noted above.     Medical, surgical, family and ObGyn history updated as appropriate. Reviewed current medications and allergies. Patient {is taking prenatal vitamins.     Discussed all of the options within [place] for her prenatal care including Dr. Frias and Dr. Niño at [facility], midwives at [facility] and OB/GYN-Dr. Cornejo and Dr. Rosenbaum. Next visit scheduled with /Dr White.     Reviewed and discussed OB 1st Trimester Plans/Education  and also summarized in detail handouts and brochures included in OB Prenatal Folder that patient is able to keep and bring home with her.    Discussed all of the blood tests with pt who agrees to have all including HIV. Pt aware that results will be discussed at next office visit with MD unless abnl results are indicated and phone call will be made.    Will forward chart on to Prenatal Care Provider to review.    Melodie Sotelo,Clinic  [facility]

## 2019-05-16 NOTE — LETTER
May 20, 2019      Karla Gordon  [address]  [place] [address]        Dear ,    We are writing to inform you of your test results.    Your test results fall within the expected range(s) or remain unchanged from previous results.  Please continue with current treatment plan.    Resulted Orders   ABO/Rh type and screen   Result Value Ref Range    ABO A     RH(D) Pos     Antibody Screen Neg     Test Valid Only At [facility]        Specimen Expires 05/19/2019    Hepatitis B surface antigen   Result Value Ref Range    Hep B Surface Agn Nonreactive NR^Nonreactive   CBC with platelets   Result Value Ref Range    WBC 11.5 (H) 4.0 - 11.0 10e9/L    RBC Count 4.09 3.8 - 5.2 10e12/L    Hemoglobin 12.4 11.7 - 15.7 g/dL    Hematocrit 38.3 35.0 - 47.0 %    MCV 94 78 - 100 fl    MCH 30.3 26.5 - 33.0 pg    MCHC 32.4 31.5 - 36.5 g/dL    RDW 12.9 10.0 - 15.0 %    Platelet Count 206 150 - 450 10e9/L   HIV Antigen Antibody Combo   Result Value Ref Range    HIV Antigen Antibody Combo Nonreactive NR^Nonreactive          Comment:      HIV-1 p24 Ag & HIV-1/HIV-2 Ab Not Detected   Rubella Antibody IgG Quantitative   Result Value Ref Range    Rubella Antibody IgG Quantitative 30 IU/mL      Comment:      Positive.  Suggests previous exposure or immunization and probable immunity  Reference Range:    Unvaccinated Negative 0-7 IU/mL  Vaccinated or previous exposure Positive 10 IU/ml or greater     Treponema Abs w Reflex to RPR and Titer   Result Value Ref Range    Treponema Antibodies Nonreactive NR^Nonreactive   Urine Culture Aerobic Bacterial   Result Value Ref Range    Specimen Description Midstream Urine     Culture Micro >100,000 colonies/mL  mixed urogenital imelda      *UA reflex to Microscopic   Result Value Ref Range    Color Urine Yellow     Appearance Urine Clear     Glucose Urine Negative NEG^Negative mg/dL    Bilirubin Urine Negative NEG^Negative    Ketones Urine Negative  NEG^Negative mg/dL    Specific Gravity Urine 1.025 1.003 - 1.035    Blood Urine Small (A) NEG^Negative    pH Urine 6.0 5.0 - 7.0 pH    Protein Albumin Urine 100 (A) NEG^Negative mg/dL    Urobilinogen Urine 0.2 0.2 - 1.0 EU/dL    Nitrite Urine Negative NEG^Negative    Leukocyte Esterase Urine Trace (A) NEG^Negative    Source Midstream Urine    Urine Microscopic   Result Value Ref Range    WBC Urine 0 - 5 OTO5^0 - 5 /HPF    RBC Urine O - 2 OTO2^O - 2 /HPF    Squamous Epithelial /LPF Urine Many (A) FEW^Few /LPF    Bacteria Urine Moderate (A) NEG^Negative /HPF       If you have any questions or concerns, please call the clinic at the number listed above.       Sincerely,        [facility] Nurse

## 2019-05-17 LAB
BACTERIA SPEC CULT: NORMAL
HBV SURFACE AG SERPL QL IA: NONREACTIVE
HIV 1+2 AB+HIV1 P24 AG SERPL QL IA: NONREACTIVE
RUBV IGG SERPL IA-ACNC: 30 IU/ML
SPECIMEN SOURCE: NORMAL
T PALLIDUM AB SER QL: NONREACTIVE

## 2019-06-25 ENCOUNTER — RESULT FOLLOW UP (OUTPATIENT)
Dept: OBGYN | Facility: CLINIC | Age: 29
End: 2019-06-25

## 2019-06-25 ENCOUNTER — PRENATAL OFFICE VISIT (OUTPATIENT)
Dept: OBGYN | Facility: CLINIC | Age: 29
End: 2019-06-25
Payer: COMMERCIAL

## 2019-06-25 VITALS
OXYGEN SATURATION: 100 % | SYSTOLIC BLOOD PRESSURE: 109 MMHG | HEIGHT: 66 IN | BODY MASS INDEX: 31.05 KG/M2 | WEIGHT: 193.2 LBS | HEART RATE: 88 BPM | DIASTOLIC BLOOD PRESSURE: 73 MMHG

## 2019-06-25 DIAGNOSIS — R87.810 CERVICAL HIGH RISK HPV (HUMAN PAPILLOMAVIRUS) TEST POSITIVE: ICD-10-CM

## 2019-06-25 DIAGNOSIS — O09.91 PREGNANCY, SUPERVISION FOR, HIGH-RISK, FIRST TRIMESTER: Primary | ICD-10-CM

## 2019-06-25 DIAGNOSIS — Z12.4 SCREENING FOR CERVICAL CANCER: ICD-10-CM

## 2019-06-25 PROCEDURE — 99207 ZZC FIRST OB VISIT: CPT | Performed by: OBSTETRICS & GYNECOLOGY

## 2019-06-25 PROCEDURE — G0476 HPV COMBO ASSAY CA SCREEN: HCPCS | Performed by: OBSTETRICS & GYNECOLOGY

## 2019-06-25 PROCEDURE — G0145 SCR C/V CYTO,THINLAYER,RESCR: HCPCS | Performed by: OBSTETRICS & GYNECOLOGY

## 2019-06-25 PROCEDURE — 87624 HPV HI-RISK TYP POOLED RSLT: CPT | Performed by: OBSTETRICS & GYNECOLOGY

## 2019-06-25 ASSESSMENT — ANXIETY QUESTIONNAIRES
IF YOU CHECKED OFF ANY PROBLEMS ON THIS QUESTIONNAIRE, HOW DIFFICULT HAVE THESE PROBLEMS MADE IT FOR YOU TO DO YOUR WORK, TAKE CARE OF THINGS AT HOME, OR GET ALONG WITH OTHER PEOPLE: SOMEWHAT DIFFICULT
2. NOT BEING ABLE TO STOP OR CONTROL WORRYING: SEVERAL DAYS
GAD7 TOTAL SCORE: 5
1. FEELING NERVOUS, ANXIOUS, OR ON EDGE: SEVERAL DAYS
6. BECOMING EASILY ANNOYED OR IRRITABLE: SEVERAL DAYS
3. WORRYING TOO MUCH ABOUT DIFFERENT THINGS: SEVERAL DAYS
7. FEELING AFRAID AS IF SOMETHING AWFUL MIGHT HAPPEN: NOT AT ALL
5. BEING SO RESTLESS THAT IT IS HARD TO SIT STILL: NOT AT ALL

## 2019-06-25 ASSESSMENT — MIFFLIN-ST. JEOR: SCORE: 1613.1

## 2019-06-25 ASSESSMENT — PATIENT HEALTH QUESTIONNAIRE - PHQ9
SUM OF ALL RESPONSES TO PHQ QUESTIONS 1-9: 4
5. POOR APPETITE OR OVEREATING: SEVERAL DAYS

## 2019-06-25 NOTE — PROGRESS NOTES
"Chief Complaint   Patient presents with     Prenatal Care     12+1.       Initial Ht 1.676 m (5' 6\")   LMP 2019   BMI 31.76 kg/m   Estimated body mass index is 31.76 kg/m  as calculated from the following:    Height as of this encounter: 1.676 m (5' 6\").    Weight as of 19: 89.3 kg (196 lb 12.8 oz).  BP completed using cuff size: large    Questioned patient about current smoking habits.  Pt. currently smokes.  Advised about smoking cessation.          The following HM Due: pap smear      The following patient reported/Care Every where data was sent to:  P ABSTRACT QUALITY INITIATIVES [00484]  n/a      n/a and patient has appointment for today          SUBJECTIVE:   Karla Gordon is a  28 year old female  @ 12w1d by LMP who presents here today with her  and 3 yr old son, Arpan  for a new Ob visit.    Estimated Date of Delivery: 2020   She is very excited.  Her periods were irregular just prior to conceiving. 3 months prior to conceiving, she had been very ill with alcoholic hepatic liver disease with associated thrombocytopenia, lymphadenopathy abdominal swelling, some lung nodules.    She reports being sober for 6 months now.  Does not go to support groups.    Has struggled with depression her whole life.  Tried lexapro and sertraline recently and got really sick on them. Currently on no treatment for her depression.  Has support from her  who is here today and their relationship has improved more recently.    She has a counselor at [facility] whom she has not seen in a while.     She has been feeling more emotional labile with the pregnancy, has had nausea and fatigue.  No vomiting, bleeding.         POBHx:  OB History    Para Term  AB Living   3 1 1 0 1 1   SAB TAB Ectopic Multiple Live Births   0 1 0 0 1      # Outcome Date GA Lbr Gentry/2nd Weight Sex Delivery Anes PTL Lv   3 Current            2 Term     M    KATELYN   1 TAB      TAB         Obstetric " Comments               Pitocin augmentation.  Pushed for long time.  7 lb 6 oz                                                        Patient Active Problem List   Diagnosis     Numbness and tingling of foot     Tinnitus, bilateral     Plugged feeling in ear, bilateral     Tobacco use disorder     Thrombocytopenia (H)     Fatty liver     Macrocytic anemia     Alcohol use disorder, moderate, dependence (H)     Dietary folate deficiency anemia     Alcoholic hepatitis with ascites     Liver lesion     Opacity of lung on imaging study     Lymphadenopathy     Mild mitral regurgitation by prior echocardiogram       Past Medical History:   Diagnosis Date     Alcohol use disorder, mild, abuse 2018     Dietary folate deficiency anemia 2018     Fatty liver 3/22/2018    3/21/18:  ALT 76,  (4.2 xULN).   18:  Hepatitis B and C negative  Patient also with thrombocytopenia and mild macrocytic anemia  18:  IMPRESSION:     1. Increased hepatic echogenicity as can be seen in intrinsic parenchymal disease such as steatosis. 2. Liver is enlarged, measuring 21.6 cm. 3. Spleen is borderline enlarged, measuring 1.9 cm     Macrocytic anemia 4/3/2018     Thrombocytopenia (H) 3/22/2018    3/21/18:  Platelets noted at 92.   18:  Platelets 92.  Mild macrocytic anemia.  Peripheral smear pending, consider Hematology referral     Tobacco use disorder 3/21/2018       Past Surgical History:   Procedure Laterality Date     ABDOMINAL PARACENTESIS  2019    US Paracentesis with Imaging at [facility].  Fluid removed: 5910 mL     C ANESTH,SHOULDER REPLACEMENT Right 2015    right partial shoulder replacement at age 25     HC TOOTH EXTRACTION W/FORCEP          Current Outpatient Medications   Medication     BIOTIN PO     diphenhydrAMINE-acetaminophen (TYLENOL PM)  MG tablet     Multiple Vitamins-Minerals (CENTRUM ULTRA WOMENS PO)     Prenatal Vit-Fe Fumarate-FA (PRENATAL VITAMIN PO)     No current  "facility-administered medications for this visit.        Allergies   Allergen Reactions     Lexapro [Escitalopram] Nausea     Severe nausea; does not wish to take again     Sertraline Nausea     Severe nausea; does not wish to take again     Spinach      Mouth gets numb         EXAM:  /73 (BP Location: Right arm, Patient Position: Sitting, Cuff Size: Adult Large)   Pulse 88   Ht 1.676 m (5' 6\")   Wt 87.6 kg (193 lb 3.2 oz)   LMP 04/01/2019   SpO2 100%   BMI 31.18 kg/m    GENERAL: WDWN WF in NAD  HEENT: no abnormalities  NECK: without thyromegaly or adenopathy  CHEST: clear to auscultation  CV: RRR without murmur  BREASTS: no palpable masses or adenopathy, no asymmetry or skin dimpling  ABDOMEN: S, NT, no palpable masses or hepatosplenomegaly  MUSCULOSKELETAL: no obvious abnormalities.  NEUROLOGICAL: normal strength, sensation, mental status  PSYCH: normal affect, appropriate  PELVIC: EG - normal adult female.  BUS - within normal limits.  Vagina - well rugated, no discharge.  Cervix - no lesions, no CMT.  Uterus - 10 - 12 wk  size and nontender.  Adnexae - no masses or tenderness.  RV - deferred.    BEDSIDE ABDOMINAL U/S: done to confirm dates and viability.  There is a single live intrauterine pregnancy noted with normal gestational sac.   There is normal  cardiac activity and fetal movement noted.  CRL = 12w6d        ASSESSMENT/ PLAN:  IUP @ 12w1d by LMP bedside US suggests might be further along. Cycles have been irregular d/t medical issues she had prior to conceiving.     Discussed routine prenatal care and first trimester screen testing.    She declined  the first trimester screen.      Patient was counselled on healthy lifestyle habits and all questions answered.    New Ob labs reviewed today.    Return to Clinic in 4 weeks or sooner if problems arise.    Leslye White MD         "

## 2019-06-26 ENCOUNTER — ANCILLARY PROCEDURE (OUTPATIENT)
Dept: ULTRASOUND IMAGING | Facility: CLINIC | Age: 29
End: 2019-06-26
Attending: OBSTETRICS & GYNECOLOGY
Payer: COMMERCIAL

## 2019-06-26 DIAGNOSIS — O09.91 PREGNANCY, SUPERVISION FOR, HIGH-RISK, FIRST TRIMESTER: ICD-10-CM

## 2019-06-26 PROCEDURE — 76801 OB US < 14 WKS SINGLE FETUS: CPT

## 2019-06-26 ASSESSMENT — ANXIETY QUESTIONNAIRES: GAD7 TOTAL SCORE: 5

## 2019-06-28 LAB
COPATH REPORT: NORMAL
PAP: NORMAL

## 2019-07-01 LAB
FINAL DIAGNOSIS: ABNORMAL
HPV HR 12 DNA CVX QL NAA+PROBE: POSITIVE
HPV16 DNA SPEC QL NAA+PROBE: NEGATIVE
HPV18 DNA SPEC QL NAA+PROBE: NEGATIVE
SPECIMEN DESCRIPTION: ABNORMAL
SPECIMEN SOURCE CVX/VAG CYTO: ABNORMAL

## 2019-07-02 NOTE — PROGRESS NOTES
06/25/19: NIL Pap, + HR HPV (not 16 or 18) result. Plan cotest in 1 year.   7/2/19 Pt advised.

## 2019-07-23 ENCOUNTER — PRENATAL OFFICE VISIT (OUTPATIENT)
Dept: OBGYN | Facility: CLINIC | Age: 29
End: 2019-07-23
Payer: COMMERCIAL

## 2019-07-23 VITALS
DIASTOLIC BLOOD PRESSURE: 56 MMHG | OXYGEN SATURATION: 98 % | BODY MASS INDEX: 31.21 KG/M2 | HEIGHT: 66 IN | SYSTOLIC BLOOD PRESSURE: 105 MMHG | WEIGHT: 194.2 LBS | HEART RATE: 86 BPM

## 2019-07-23 DIAGNOSIS — F17.200 TOBACCO USE DISORDER: ICD-10-CM

## 2019-07-23 DIAGNOSIS — O09.91 PREGNANCY, SUPERVISION FOR, HIGH-RISK, FIRST TRIMESTER: Primary | ICD-10-CM

## 2019-07-23 PROCEDURE — 99207 ZZC PRENATAL VISIT: CPT | Performed by: OBSTETRICS & GYNECOLOGY

## 2019-07-23 RX ORDER — SERTRALINE HYDROCHLORIDE 25 MG/1
TABLET, FILM COATED ORAL
Refills: 1 | Status: ON HOLD | COMMUNITY
Start: 2019-07-12 | End: 2019-12-30

## 2019-07-23 ASSESSMENT — MIFFLIN-ST. JEOR: SCORE: 1617.64

## 2019-07-23 NOTE — PROGRESS NOTES
17w0d   Still feeling really tired, eating ok and appetite is ok.  We reviewed her US for dating.  Will go with US EDC of 12/31/19 based on uncertainty of LMP and irregularity of cycles at time of conception.  Still smoking anywhere from 3 cig to 1/2 ppd.  discussed cessation.   discussed quad screen and she will check with insurance. Will come back for quad screen if insurance covers it.  Will get [facility] US survey.  RR

## 2019-07-24 ENCOUNTER — TRANSCRIBE ORDERS (OUTPATIENT)
Dept: MATERNAL FETAL MEDICINE | Facility: CLINIC | Age: 29
End: 2019-07-24

## 2019-07-24 DIAGNOSIS — O26.90 PREGNANCY RELATED CONDITION, ANTEPARTUM: Primary | ICD-10-CM

## 2019-08-13 ENCOUNTER — PRE VISIT (OUTPATIENT)
Dept: MATERNAL FETAL MEDICINE | Facility: CLINIC | Age: 29
End: 2019-08-13

## 2019-08-15 ENCOUNTER — HOSPITAL ENCOUNTER (OUTPATIENT)
Dept: ULTRASOUND IMAGING | Facility: CLINIC | Age: 29
Discharge: HOME | End: 2019-08-15
Attending: OBSTETRICS & GYNECOLOGY | Admitting: OBSTETRICS & GYNECOLOGY
Payer: COMMERCIAL

## 2019-08-15 ENCOUNTER — OFFICE VISIT (OUTPATIENT)
Dept: MATERNAL FETAL MEDICINE | Facility: CLINIC | Age: 29
End: 2019-08-15
Attending: OBSTETRICS & GYNECOLOGY
Payer: COMMERCIAL

## 2019-08-15 DIAGNOSIS — O26.90 PREGNANCY RELATED CONDITION, ANTEPARTUM: ICD-10-CM

## 2019-08-15 DIAGNOSIS — O26.90 PREGNANCY RELATED CONDITION, ANTEPARTUM: Primary | ICD-10-CM

## 2019-08-15 DIAGNOSIS — K70.30 ALCOHOLIC CIRRHOSIS OF LIVER WITHOUT ASCITES (H): ICD-10-CM

## 2019-08-15 PROCEDURE — 76811 OB US DETAILED SNGL FETUS: CPT

## 2019-08-15 NOTE — PROGRESS NOTES
"Please see \"Imaging\" tab under \"Chart Review\" for details of today's US.    Nathalie Weinstein, DO    "

## 2019-08-20 ENCOUNTER — PRENATAL OFFICE VISIT (OUTPATIENT)
Dept: OBGYN | Facility: CLINIC | Age: 29
End: 2019-08-20
Payer: COMMERCIAL

## 2019-08-20 VITALS
BODY MASS INDEX: 31.66 KG/M2 | WEIGHT: 197 LBS | SYSTOLIC BLOOD PRESSURE: 91 MMHG | OXYGEN SATURATION: 97 % | DIASTOLIC BLOOD PRESSURE: 68 MMHG | HEART RATE: 91 BPM | HEIGHT: 66 IN

## 2019-08-20 DIAGNOSIS — O09.899 SUPERVISION OF OTHER HIGH RISK PREGNANCY, ANTEPARTUM: Primary | ICD-10-CM

## 2019-08-20 DIAGNOSIS — D69.6 THROMBOCYTOPENIA (H): ICD-10-CM

## 2019-08-20 DIAGNOSIS — K70.11 ALCOHOLIC HEPATITIS WITH ASCITES (H): ICD-10-CM

## 2019-08-20 DIAGNOSIS — R59.1 LYMPHADENOPATHY: ICD-10-CM

## 2019-08-20 DIAGNOSIS — K70.9 LIVER DISEASE, CHRONIC, DUE TO ALCOHOL (H): ICD-10-CM

## 2019-08-20 LAB
ERYTHROCYTE [DISTWIDTH] IN BLOOD BY AUTOMATED COUNT: 13.5 % (ref 10–15)
HCT VFR BLD AUTO: 32.1 % (ref 35–47)
HGB BLD-MCNC: 10.9 G/DL (ref 11.7–15.7)
MCH RBC QN AUTO: 31.1 PG (ref 26.5–33)
MCHC RBC AUTO-ENTMCNC: 34 G/DL (ref 31.5–36.5)
MCV RBC AUTO: 92 FL (ref 78–100)
PLATELET # BLD AUTO: 215 10E9/L (ref 150–450)
RBC # BLD AUTO: 3.51 10E12/L (ref 3.8–5.2)
WBC # BLD AUTO: 14.4 10E9/L (ref 4–11)

## 2019-08-20 PROCEDURE — 85730 THROMBOPLASTIN TIME PARTIAL: CPT | Performed by: OBSTETRICS & GYNECOLOGY

## 2019-08-20 PROCEDURE — 99207 ZZC PRENATAL VISIT: CPT | Performed by: OBSTETRICS & GYNECOLOGY

## 2019-08-20 PROCEDURE — 80053 COMPREHEN METABOLIC PANEL: CPT | Performed by: OBSTETRICS & GYNECOLOGY

## 2019-08-20 PROCEDURE — 36415 COLL VENOUS BLD VENIPUNCTURE: CPT | Performed by: OBSTETRICS & GYNECOLOGY

## 2019-08-20 PROCEDURE — 85027 COMPLETE CBC AUTOMATED: CPT | Performed by: OBSTETRICS & GYNECOLOGY

## 2019-08-20 PROCEDURE — 85610 PROTHROMBIN TIME: CPT | Performed by: OBSTETRICS & GYNECOLOGY

## 2019-08-20 ASSESSMENT — MIFFLIN-ST. JEOR: SCORE: 1630.34

## 2019-08-21 LAB
ALBUMIN SERPL-MCNC: 3.3 G/DL (ref 3.4–5)
ALP SERPL-CCNC: 86 U/L (ref 40–150)
ALT SERPL W P-5'-P-CCNC: 19 U/L (ref 0–50)
ANION GAP SERPL CALCULATED.3IONS-SCNC: 11 MMOL/L (ref 3–14)
APTT PPP: 31 SEC (ref 22–37)
AST SERPL W P-5'-P-CCNC: 18 U/L (ref 0–45)
BILIRUB SERPL-MCNC: 0.2 MG/DL (ref 0.2–1.3)
BUN SERPL-MCNC: 11 MG/DL (ref 7–30)
CALCIUM SERPL-MCNC: 9.4 MG/DL (ref 8.5–10.1)
CHLORIDE SERPL-SCNC: 104 MMOL/L (ref 94–109)
CO2 SERPL-SCNC: 23 MMOL/L (ref 20–32)
CREAT SERPL-MCNC: 0.41 MG/DL (ref 0.52–1.04)
GFR SERPL CREATININE-BSD FRML MDRD: >90 ML/MIN/{1.73_M2}
GLUCOSE SERPL-MCNC: 82 MG/DL (ref 70–99)
INR PPP: 0.93 (ref 0.86–1.14)
POTASSIUM SERPL-SCNC: 4.2 MMOL/L (ref 3.4–5.3)
PROT SERPL-MCNC: 7.4 G/DL (ref 6.8–8.8)
SODIUM SERPL-SCNC: 138 MMOL/L (ref 133–144)

## 2019-08-21 NOTE — PROGRESS NOTES
21w0d   No complaints.  Starting to feel FM.  Having a girl and very excited about that.    [facility] US was normal but there is concern for mom's history of alcoholic liver disease.   January 2019 she was noted to have an abnormal CTS with hepatosplenomegaly and evidence of portal hypertension (based on  splenomegaly, ascites and recanalization of the umbilical vein). She had a paracentesis on 1/24/19 and 6L of ascites were removed.  She does not have ongoing follow-up with GI. Plan for her per [facility] consult was to get an abdominal US and labs including CMP and coags.    We reviewed this plan today with patient.  She is feeling well and has no clinical symptoms from the above noted findings.   Plan GCT at [place].  RR

## 2019-08-26 ENCOUNTER — ANCILLARY PROCEDURE (OUTPATIENT)
Dept: ULTRASOUND IMAGING | Facility: CLINIC | Age: 29
End: 2019-08-26
Attending: OBSTETRICS & GYNECOLOGY
Payer: COMMERCIAL

## 2019-08-26 DIAGNOSIS — K70.11 ALCOHOLIC HEPATITIS WITH ASCITES (H): ICD-10-CM

## 2019-08-26 DIAGNOSIS — R59.1 LYMPHADENOPATHY: ICD-10-CM

## 2019-08-26 DIAGNOSIS — O09.899 SUPERVISION OF OTHER HIGH RISK PREGNANCY, ANTEPARTUM: ICD-10-CM

## 2019-08-26 PROCEDURE — 76700 US EXAM ABDOM COMPLETE: CPT

## 2019-09-05 ENCOUNTER — OFFICE VISIT (OUTPATIENT)
Dept: MATERNAL FETAL MEDICINE | Facility: CLINIC | Age: 29
End: 2019-09-05
Attending: OBSTETRICS & GYNECOLOGY
Payer: COMMERCIAL

## 2019-09-05 ENCOUNTER — HOSPITAL ENCOUNTER (OUTPATIENT)
Dept: ULTRASOUND IMAGING | Facility: CLINIC | Age: 29
Discharge: HOME | End: 2019-09-05
Attending: OBSTETRICS & GYNECOLOGY | Admitting: OBSTETRICS & GYNECOLOGY
Payer: COMMERCIAL

## 2019-09-05 DIAGNOSIS — O26.90 PREGNANCY RELATED CONDITION, ANTEPARTUM: ICD-10-CM

## 2019-09-05 DIAGNOSIS — K70.30 ALCOHOLIC CIRRHOSIS OF LIVER WITHOUT ASCITES (H): Primary | ICD-10-CM

## 2019-09-05 PROCEDURE — 76816 OB US FOLLOW-UP PER FETUS: CPT

## 2019-09-05 NOTE — PROGRESS NOTES
Maternal-Fetal Medicine Consultation    Name: Karla Gordon  Referring provider: Dr. White  Consulted provider: Dr. Aldana      Referral: Dear Dr. White,  Thank you for referring Ms. Acosta for a Maternal-Fetal Medicine consultation.    HPI: Karla is 28 year-old  @ 23w2d  referred for a Maternal-Fetal Medicine consultation in regards to her history of alcoholic hepatic steatosis.  She has a long history of alcohol use. States her last drink was Dec 2018. Sh was admitted on 2018 for dizziness evaluation and was found to have HELLP with Hg of 4.6 and elevated LDH due to folate deficiency 2/2 alcohol use. She states that she saw [facility] afterwards but no more follow ups since. On 2019 she was seen at [facility] ED with abd and b/l LE swelling. CT imaging showed Severe hepatic steatosis as well as findings consistent with portal venous hypertension including a small amount of diffuse simple ascites, borderline splenomegaly and recanalization of the umbilical vein. At that time she had ~6 L paracentesis with cultures resulted as neg. Her INR noted to be prolonged for 1.46. Her H&H, and platelets were better than in 2018. She declined admission at the time.   Recent lab w/u on  showed H&H of 10.9/32.1, platelets: 213, nl coags, nl CMP.  US abdomen on 19: Enlarged, mildly fatty infiltrated liver. No ascites or worrisome mass.  She also reports cigarettes smoking 3 cigarettes/2 ppd.     Past Medical History:   Past Medical History:   Diagnosis Date     Alcohol use disorder, mild, abuse 2018     Cervical high risk HPV (human papillomavirus) test positive 2019    See problem list.      Dietary folate deficiency anemia 2018     Fatty liver 3/22/2018    3/21/18:  ALT 76,  (4.2 xULN).   18:  Hepatitis B and C negative  Patient also with thrombocytopenia and mild macrocytic anemia  18:  IMPRESSION:     1. Increased hepatic echogenicity as can be seen  in intrinsic parenchymal disease such as steatosis. 2. Liver is enlarged, measuring 21.6 cm. 3. Spleen is borderline enlarged, measuring 1.9 cm     Macrocytic anemia 4/3/2018     Thrombocytopenia (H) 3/22/2018    3/21/18:  Platelets noted at 92.   18:  Platelets 92.  Mild macrocytic anemia.  Peripheral smear pending, consider Hematology referral     Tobacco use disorder 3/21/2018     Past Surgical History:   Past Surgical History:   Procedure Laterality Date     ABDOMINAL PARACENTESIS  2019    US Paracentesis with Imaging at [facility].  Fluid removed: 5910 mL     C ANESTH,SHOULDER REPLACEMENT Right 2015    right partial shoulder replacement at age 25     HC TOOTH EXTRACTION W/FORCEP       Obstetric History:   OB History    Para Term  AB Living   3 1 1 0 1 1   SAB TAB Ectopic Multiple Live Births   0 1 0 0 1      # Outcome Date GA Lbr Gentry/2nd Weight Sex Delivery Anes PTL Lv   3 Current            2 Term     M    KATELYN   1 TAB      TAB         Obstetric Comments               Pitocin augmentation.  Pushed for long time.  7 lb 6 oz                                                   Gynecological History: Denies h/o STI, fibroids, or abn pap  Medications:   Current Outpatient Medications   Medication     BIOTIN PO     diphenhydrAMINE-acetaminophen (TYLENOL PM)  MG tablet     Multiple Vitamins-Minerals (CENTRUM ULTRA WOMENS PO)     Prenatal Vit-Fe Fumarate-FA (PRENATAL VITAMIN PO)     sertraline (ZOLOFT) 25 MG tablet     No current facility-administered medications for this visit.      Allergy:   Allergies   Allergen Reactions     Lexapro [Escitalopram] Nausea     Severe nausea; does not wish to take again     Spinach      Mouth gets numb     Social History: She cut down on cigarettes, currently reports smoking 3 cigarettes- 1/2 ppd. No current ETOH or illicit drug use.   Family History: non contributory   Genetic evaluation: declined  Imaging: Please review report under imaging tab      Impression:  Ms. Gordon is a 29yo  at 23w2d with a history of tobacco use and alcoholic liver disease, was seen today with regard to her alcohol-related liver disease. Findings in 19 were hepatosplenomegaly and portal hypertension with ascites. Her recent liver function, coags, and platelets were noted to be normal. Hepatology on curbside consult said this was likely acute alcoholic hepatitis, and she needs evaluation with Hepatology for liver fibrosis. We briefly discussed further management including endoscopy if liver cirrhosis was detected on the fibrosis scan.  We discussed her alcohol use, and she reports that her last drink was 2018, prior to her LMP in 2019. Alcohol use early in pregnancy can affect fetal development causing poor growth, structural abnormalities, and developmental delay as well as Fetal Alcohol Syndrome. Alcohol use also puts the mother at risk of acute and chronic liver disease, portal hypertension, and esophageal varices.   On today's growth ultrasound placental calcifications were noted, US was otherwise normal. She has cut down on smoking but has not stopped completely. We discussed smoking in pregnancy and the related fetal effects including  birth, growth restriction, placental abruption.    At the conclusion of our discussion we have made the following recommendations:  - Referral to hepatology for liver fibrosis scan in the second trimester to establish current liver function. Our clinic called and the GI nurse will call the patient to set up an appointment  - Recommend viral hepatitis labs and follow-up with Dr. Medina (Primary OB)  - Return for repeat growth ultrasound at 28 weeks given the placental calcifications seen today.  - Further antepartum assessments to be decided after GI visit and the follow-up US  - Recommend to continue cutting down on smoking as close to no use as possible    Ms. Gordon expressed full understanding of  the above-mentioned discussion and plan. All questions answered and concerns addressed.    A copy of this consultation will be sent to your office.     Thank you for the opportunity to participate in the care of this patient.  If you have questions, regarding today s evaluation or if we can be of further service, please contact the [facility].    I served as a scribe for Dr. Aldana during this encounter.    Kaya García, MS4  9/5/2019 3:57PM    I was present with the medical student who participated in the service and in the documentation of the note. I have verified the history and personally performed the physical exam and medical decision making. I agree with the assessment and plan of care as documented in the note.    Key history or physical exam findings: history of alcoholic hepatitis, apparently now improved but needs assessment for risk of cirrhosis and evaluation for risk of exposure to viral hepatitis.    Key management decisions made: coordinate care with hepatology to have liver ultrasound scan as well as testing to assess immune status for hepatitis A/B/C.    Aj Aldana  Date of Service (when I saw the patient): 9/5/19    Time Spent on this Encounter   I, Aj Aldana, spent a total of 30 minutes in face to face consultation today managing the care of Karla BAPTISTE Evan.  Over 50% of my time on the unit was spent counseling the patient and /or coordinating care regarding evaluation for liver disease. See note for details.

## 2019-09-05 NOTE — NURSING NOTE
Vanesa here with her  Dony for f/u comp and consult due to preg c/b h/o alcoholic liver cirrhosis. Dr. Aldana and Dr. Mike in to see patient. Dr. Andrade was called from hepatology and said that INDY Watson can see patient in the next few weeks. Patient left amb and stable. Theresa Pinedo RN

## 2019-09-24 ENCOUNTER — PRENATAL OFFICE VISIT (OUTPATIENT)
Dept: OBGYN | Facility: CLINIC | Age: 29
End: 2019-09-24
Payer: COMMERCIAL

## 2019-09-24 VITALS
WEIGHT: 202 LBS | HEART RATE: 89 BPM | BODY MASS INDEX: 32.47 KG/M2 | HEIGHT: 66 IN | OXYGEN SATURATION: 98 % | SYSTOLIC BLOOD PRESSURE: 106 MMHG | DIASTOLIC BLOOD PRESSURE: 69 MMHG | TEMPERATURE: 98.4 F

## 2019-09-24 DIAGNOSIS — D69.6 THROMBOCYTOPENIA (H): ICD-10-CM

## 2019-09-24 DIAGNOSIS — R07.0 THROAT PAIN: ICD-10-CM

## 2019-09-24 DIAGNOSIS — O09.899 SUPERVISION OF OTHER HIGH RISK PREGNANCY, ANTEPARTUM: Primary | ICD-10-CM

## 2019-09-24 DIAGNOSIS — K70.9 LIVER DISEASE, CHRONIC, DUE TO ALCOHOL (H): ICD-10-CM

## 2019-09-24 LAB
DEPRECATED S PYO AG THROAT QL EIA: NORMAL
HGB BLD-MCNC: 9.8 G/DL (ref 11.7–15.7)
PLATELET # BLD AUTO: 249 10E9/L (ref 150–450)
SPECIMEN SOURCE: NORMAL

## 2019-09-24 PROCEDURE — 00000218 ZZHCL STATISTIC OBHBG - HEMOGLOBIN: Performed by: OBSTETRICS & GYNECOLOGY

## 2019-09-24 PROCEDURE — 87081 CULTURE SCREEN ONLY: CPT | Performed by: OBSTETRICS & GYNECOLOGY

## 2019-09-24 PROCEDURE — 85049 AUTOMATED PLATELET COUNT: CPT | Performed by: OBSTETRICS & GYNECOLOGY

## 2019-09-24 PROCEDURE — 36415 COLL VENOUS BLD VENIPUNCTURE: CPT | Performed by: OBSTETRICS & GYNECOLOGY

## 2019-09-24 PROCEDURE — 87880 STREP A ASSAY W/OPTIC: CPT | Performed by: OBSTETRICS & GYNECOLOGY

## 2019-09-24 PROCEDURE — 82950 GLUCOSE TEST: CPT | Performed by: OBSTETRICS & GYNECOLOGY

## 2019-09-24 PROCEDURE — 86780 TREPONEMA PALLIDUM: CPT | Performed by: OBSTETRICS & GYNECOLOGY

## 2019-09-24 PROCEDURE — 99207 ZZC PRENATAL VISIT: CPT | Performed by: OBSTETRICS & GYNECOLOGY

## 2019-09-24 ASSESSMENT — ANXIETY QUESTIONNAIRES
1. FEELING NERVOUS, ANXIOUS, OR ON EDGE: MORE THAN HALF THE DAYS
3. WORRYING TOO MUCH ABOUT DIFFERENT THINGS: SEVERAL DAYS
6. BECOMING EASILY ANNOYED OR IRRITABLE: NOT AT ALL
7. FEELING AFRAID AS IF SOMETHING AWFUL MIGHT HAPPEN: NOT AT ALL
5. BEING SO RESTLESS THAT IT IS HARD TO SIT STILL: NOT AT ALL
IF YOU CHECKED OFF ANY PROBLEMS ON THIS QUESTIONNAIRE, HOW DIFFICULT HAVE THESE PROBLEMS MADE IT FOR YOU TO DO YOUR WORK, TAKE CARE OF THINGS AT HOME, OR GET ALONG WITH OTHER PEOPLE: SOMEWHAT DIFFICULT
GAD7 TOTAL SCORE: 5
2. NOT BEING ABLE TO STOP OR CONTROL WORRYING: SEVERAL DAYS

## 2019-09-24 ASSESSMENT — MIFFLIN-ST. JEOR: SCORE: 1653.02

## 2019-09-24 ASSESSMENT — PATIENT HEALTH QUESTIONNAIRE - PHQ9
SUM OF ALL RESPONSES TO PHQ QUESTIONS 1-9: 5
5. POOR APPETITE OR OVEREATING: SEVERAL DAYS

## 2019-09-24 NOTE — PROGRESS NOTES
26w0d   No complaints.  Mood is stable. Still tired but overall feeling well. feeling normal FM.    Was seen by MFM,  reviewed recommendations.  Patient was supposed to see Christine in dr Andrade's office but she missed the appt due to her son went to urgent care and diagnosed with pneumonia.  Patient will call to reschedule the appt. GCT and labs today.  hgb came back at 9.8 so iron recommended. Patient still smoking but has cut back some.  Going to AA meetings.  RR

## 2019-09-25 LAB
BACTERIA SPEC CULT: NORMAL
GLUCOSE 1H P 50 G GLC PO SERPL-MCNC: 136 MG/DL (ref 60–129)
SPECIMEN SOURCE: NORMAL

## 2019-09-25 ASSESSMENT — ANXIETY QUESTIONNAIRES: GAD7 TOTAL SCORE: 5

## 2019-09-26 ENCOUNTER — TELEPHONE (OUTPATIENT)
Dept: OBGYN | Facility: CLINIC | Age: 29
End: 2019-09-26

## 2019-09-26 NOTE — TELEPHONE ENCOUNTER
TC to pt to discuss scheduling of abd duplex US and clinic visit with Dr. Andrade ().  Left detailed message.  Pt can call [phone] to schedule US.  Dr. Andrade's office is  [phone].  Pt likely needs to transfer care to [facility] [place] for high risk pregnancy.  Katie Sharma RN

## 2019-09-27 LAB — T PALLIDUM AB SER QL: NONREACTIVE

## 2019-10-01 NOTE — TELEPHONE ENCOUNTER
Pt has been called multiple times by OB, GI, and US for scheduling.  She has not scheduled any further appointments.  Katie Sharma RN

## 2019-10-02 ENCOUNTER — HOSPITAL ENCOUNTER (OUTPATIENT)
Facility: CLINIC | Age: 29
End: 2019-10-02
Payer: COMMERCIAL

## 2019-10-02 NOTE — TELEPHONE ENCOUNTER
amand  RECORDS RECEIVED FROM: Internal - per [facility]   DATE RECEIVED: 10.09.2019   NOTES STATUS DETAILS   OFFICE NOTE from referring provider Internal 09.24.2019 Consult   OFFICE NOTES from other specialists Internal 09.05.2019  08.15.2019  01.18.2019   DISCHARGE SUMMARY from hospital N/A    MEDICATION LIST N/A    LIVER BIOSPY (IF APPLICABLE)      PATHOLOGY REPORTS  N/A    IMAGING     ENDOSCOPY (IF AVAILABLE) N/A    COLONOSCOPY (IF AVAILABLE) N/A    ULTRASOUND LIVER Internal 08.26.2019   CT OF ABDOMEN Care Everywhere 01.13.2019   MRI OF LIVER N/A    FIBROSCAN, US ELASTOGRAPHY, FIBROSIS SCAN, MR ELASTOGRAPHY N/A    LABS     HEPATIC PANEL (LIVER PANEL) In process 08.15.2019   BASIC METABOLIC PANEL In process 08.15.2019   COMPLETE METABOLIC PANEL N/A    COMPLETE BLOOD COUNT (CBC) Internal 08.20.2019   INTERNATIONAL NORMALIZED RATIO (INR) Internal 08.20.2019   HEPATITIS C ANTIBODY N/A    HEPATITIS C VIRAL LOAD/PCR N/A    HEPATITIS C GENOTYPE N/A    HEPATITIS B SURFACE ANTIGEN Internal 05.16.2019 11.17.2018   HEPATITIS B SURFACE ANTIBODY Internal 11.17.2018   HEPATITIS B DNA QUANT LEVEL N/A    HEPATITIS B CORE ANTIBODY Internal 11.17.2018

## 2019-10-04 ENCOUNTER — TELEPHONE (OUTPATIENT)
Dept: OBGYN | Facility: CLINIC | Age: 29
End: 2019-10-04

## 2019-10-04 DIAGNOSIS — O09.899 SUPERVISION OF OTHER HIGH RISK PREGNANCY, ANTEPARTUM: Primary | ICD-10-CM

## 2019-10-09 ENCOUNTER — PRE VISIT (OUTPATIENT)
Dept: GASTROENTEROLOGY | Facility: CLINIC | Age: 29
End: 2019-10-09

## 2019-10-10 ENCOUNTER — OFFICE VISIT (OUTPATIENT)
Dept: MATERNAL FETAL MEDICINE | Facility: CLINIC | Age: 29
End: 2019-10-10
Attending: OBSTETRICS & GYNECOLOGY
Payer: COMMERCIAL

## 2019-10-10 ENCOUNTER — HOSPITAL ENCOUNTER (OUTPATIENT)
Dept: ULTRASOUND IMAGING | Facility: CLINIC | Age: 29
Discharge: HOME | End: 2019-10-10
Attending: OBSTETRICS & GYNECOLOGY | Admitting: OBSTETRICS & GYNECOLOGY
Payer: COMMERCIAL

## 2019-10-10 DIAGNOSIS — O26.90 PREGNANCY RELATED CONDITION, ANTEPARTUM: ICD-10-CM

## 2019-10-10 DIAGNOSIS — K70.30 ALCOHOLIC CIRRHOSIS OF LIVER WITHOUT ASCITES (H): ICD-10-CM

## 2019-10-10 DIAGNOSIS — O09.90 HIGH-RISK PREGNANCY, UNSPECIFIED TRIMESTER: Primary | ICD-10-CM

## 2019-10-10 PROCEDURE — 76816 OB US FOLLOW-UP PER FETUS: CPT

## 2019-10-11 ENCOUNTER — ANCILLARY PROCEDURE (OUTPATIENT)
Dept: ULTRASOUND IMAGING | Facility: CLINIC | Age: 29
End: 2019-10-11
Attending: OBSTETRICS & GYNECOLOGY
Payer: COMMERCIAL

## 2019-10-11 ENCOUNTER — TELEPHONE (OUTPATIENT)
Dept: MATERNAL FETAL MEDICINE | Facility: CLINIC | Age: 29
End: 2019-10-11

## 2019-10-11 DIAGNOSIS — K70.30 ALCOHOLIC CIRRHOSIS OF LIVER WITHOUT ASCITES (H): ICD-10-CM

## 2019-10-11 NOTE — TELEPHONE ENCOUNTER
Called patient with results of fibrosis US.    From the interpretation there does NOT appear to be evidence of portal HTN.  I will review the US findings with a hepatologist next week, but as of right now the patient can continue care with Dr. White.  MFM will continue to follow fetal growth.    Nathalie Weinstein DO FACOG  Maternal Fetal Medicine Specialist  Pager: [phone]  Mobile: [phone]

## 2019-10-14 DIAGNOSIS — O09.899 SUPERVISION OF OTHER HIGH RISK PREGNANCY, ANTEPARTUM: ICD-10-CM

## 2019-10-14 PROCEDURE — 82952 GTT-ADDED SAMPLES: CPT | Performed by: OBSTETRICS & GYNECOLOGY

## 2019-10-14 PROCEDURE — 82951 GLUCOSE TOLERANCE TEST (GTT): CPT | Performed by: OBSTETRICS & GYNECOLOGY

## 2019-10-14 PROCEDURE — 36415 COLL VENOUS BLD VENIPUNCTURE: CPT | Performed by: OBSTETRICS & GYNECOLOGY

## 2019-10-15 ENCOUNTER — TELEPHONE (OUTPATIENT)
Dept: OBGYN | Facility: CLINIC | Age: 29
End: 2019-10-15

## 2019-10-15 DIAGNOSIS — O24.419 GDM (GESTATIONAL DIABETES MELLITUS): Primary | ICD-10-CM

## 2019-10-15 LAB
GLUCOSE 1H P 100 G GLC PO SERPL-MCNC: 184 MG/DL (ref 60–179)
GLUCOSE 2H P 100 G GLC PO SERPL-MCNC: 165 MG/DL (ref 60–154)
GLUCOSE 3H P 100 G GLC PO SERPL-MCNC: 84 MG/DL (ref 60–139)
GLUCOSE P FAST SERPL-MCNC: 89 MG/DL (ref 60–94)

## 2019-10-24 ENCOUNTER — PRENATAL OFFICE VISIT (OUTPATIENT)
Dept: OBGYN | Facility: CLINIC | Age: 29
End: 2019-10-24
Payer: COMMERCIAL

## 2019-10-24 VITALS
SYSTOLIC BLOOD PRESSURE: 110 MMHG | BODY MASS INDEX: 32.69 KG/M2 | HEART RATE: 107 BPM | WEIGHT: 203.4 LBS | DIASTOLIC BLOOD PRESSURE: 68 MMHG | TEMPERATURE: 98.3 F | OXYGEN SATURATION: 96 % | HEIGHT: 66 IN

## 2019-10-24 DIAGNOSIS — Z23 NEED FOR TDAP VACCINATION: Primary | ICD-10-CM

## 2019-10-24 PROCEDURE — 90471 IMMUNIZATION ADMIN: CPT | Performed by: OBSTETRICS & GYNECOLOGY

## 2019-10-24 PROCEDURE — 99207 ZZC PRENATAL VISIT: CPT | Performed by: OBSTETRICS & GYNECOLOGY

## 2019-10-24 PROCEDURE — 90715 TDAP VACCINE 7 YRS/> IM: CPT | Performed by: OBSTETRICS & GYNECOLOGY

## 2019-10-24 ASSESSMENT — MIFFLIN-ST. JEOR: SCORE: 1659.37

## 2019-10-24 NOTE — PROGRESS NOTES
Clinic Administered Medication Documentation    MEDICATION LIST:   Injectable Medication Documentation    Patient was given tdap. Prior to medication administration, verified patients identity using patient s name and date of birth. Please see MAR and medication order for additional information. Patient instructed to remain in clinic for 15 minutes.      Was entire vial of medication used? Yes  Vial/Syringe: Syringe  Expiration Date:  7/26/2021  Was this medication supplied by the patient? No

## 2019-10-24 NOTE — PROGRESS NOTES
30w2d  No complaints. Starting to feel more tired.  Baby is moving well.   Abd US reassurring, improvement in liver size and blood flow compared to previous pre-pregnancy US.   MFM US showed normal Fetal growth at 49%.  Placenta calcified.  discussed smoking cessation.  Patient trying to cut back.   Tdap shot today.  RR

## 2019-10-30 ENCOUNTER — ALLIED HEALTH/NURSE VISIT (OUTPATIENT)
Dept: EDUCATION SERVICES | Facility: CLINIC | Age: 29
End: 2019-10-30
Payer: COMMERCIAL

## 2019-10-30 DIAGNOSIS — O24.419 GDM (GESTATIONAL DIABETES MELLITUS): Primary | ICD-10-CM

## 2019-10-30 PROCEDURE — G0109 DIAB MANAGE TRN IND/GROUP: HCPCS

## 2019-10-30 RX ORDER — BLOOD-GLUCOSE METER
1 EACH MISCELLANEOUS ONCE
Qty: 1 KIT | Refills: 0 | COMMUNITY
Start: 2019-10-30 | End: 2020-02-11

## 2019-10-30 NOTE — GROUP NOTE
Diabetes Diabetes Self-Management Education & Support  10/30/2019  [place] [facility] [place]  Start and End Time  Start Time: 0800  End Time: 1000    SUBJECTIVE / OBJECTIVE  Cultural Influences/Ethnic Background:  American    Estimated Date of Delivery: Dec 31, 2019    1 hour OGTT  Lab Results   Component Value Date    GLU1 136 (H) 09/24/2019       3 hour OGTT    Fasting  Lab Results   Component Value Date    GLF 89 10/14/2019       1 hour  Lab Results   Component Value Date    GL1 184 (H) 10/14/2019       2 hour  Lab Results   Component Value Date    GL2 165 (H) 10/14/2019       3 hour  Lab Results   Component Value Date    GL3 84 10/14/2019       Lifestyle and Health Behaviors:       Healthy Coping:       Current Management:       ASSESSMENT:  Reviewed target blood glucose values, sharps disposal, diagnosis criteria for GDM and importance of good blood glucose management for health of mom and baby. Patient advised to call if 3 blood glucose elevated before returns next week. Instructed on ketone checking and told to call if unable to get ketones negative.     Discussed carbohydrate sources and impact on blood glucose. Reviewed basics of healthy eating and incorporating a variety of foods into meal plan. Instructed on carbohydrate counting and label reading and recommended patient consume 2-3 CHO for breakfast, 3-4 CHO for lunch and dinner and 1-2 CHO for each snack, 3 snacks a day. This meal plan will provide 11-17 CHO/day so informed patient to call if struggling since may be able to adjust meal plan if needed.  Used food models to help demonstrate portion control and suggested plate method to balance meals. Discussed importance of not going too low in CHO since that may cause liver to produce excess glucose and contribute to elevated blood glucose readings and ketone formation. Encouraged eating breakfast within 1 hour of waking.  To also help prevent against ketone formation, protein was encouraged  with meals and snacks, especially with the night snack. Reviewed benefits of exercising to help lower blood glucose and encouraged 30 minutes a day as tolerated and per MD approval, and to target exercise after meals if feel consumed too many CHO or having problem with BG being elevated after a meal. Pt verbalized understanding of concepts discussed and recommendations provided.    INTERVENTION:  Patient was instructed on Contour Next One meter and was able to provide an accurate return demonstration. Patient's blood glucose reading today was 130 mg/dL.    Gestational Diabetes Self-Management Education & Support    Educational topics covered today:  GDM diagnosis, pathophysiology, Risks and Complications of GDM, Means of controlling GDM, Using a Blood Glucose Monitor, Blood Glucose Goals, Logging and Interpreting Glucose Results, Ketone Testing, When to Call a Diabetes Educator or OB Provider, Healthy Eating During Pregnancy, Counting Carbohydrates, Meal Planning for GDM, and Physical Activity    Educational materials provided today:   Beau Burgos Gestational Diabetes  GDM Log Book  Sharps Disposal  Care After Delivery  Pt verbalized understanding of concepts discussed and recommendations provided today.     PLAN:  Check glucose 4 times daily, before breakfast and 1 hour after each meal.     Check Ketones daily for one week, if negative, reduce testing to once a week.     Follow consistent CHO meal plan, eat CHO and protein/fat at all meals/snacks.    Call/e-mail/Pin or Peghart message diabetes educator if 3 or more blood sugars are above the goal in 1 week or if ketones are positive or with questions/concerns.        Physical activity recommended: 10-15 minutes after meals.    Meal plan: 2-3 carbs at breakfast, 3-4 carbs at lunch, 3-4 carbs at supper, 1-2 carbs at 3 snacks a day.    Diabetes Educator:  Vanesa Hunt RD

## 2019-11-05 ENCOUNTER — PRENATAL OFFICE VISIT (OUTPATIENT)
Dept: OBGYN | Facility: CLINIC | Age: 29
End: 2019-11-05
Payer: COMMERCIAL

## 2019-11-05 VITALS
DIASTOLIC BLOOD PRESSURE: 66 MMHG | OXYGEN SATURATION: 98 % | BODY MASS INDEX: 32.62 KG/M2 | SYSTOLIC BLOOD PRESSURE: 108 MMHG | HEART RATE: 98 BPM | HEIGHT: 66 IN | WEIGHT: 203 LBS

## 2019-11-05 DIAGNOSIS — O09.899 SUPERVISION OF OTHER HIGH RISK PREGNANCY, ANTEPARTUM: Primary | ICD-10-CM

## 2019-11-05 DIAGNOSIS — O24.410 DIET CONTROLLED GESTATIONAL DIABETES MELLITUS (GDM) IN THIRD TRIMESTER: ICD-10-CM

## 2019-11-05 PROCEDURE — 99207 ZZC PRENATAL VISIT: CPT | Performed by: OBSTETRICS & GYNECOLOGY

## 2019-11-05 ASSESSMENT — MIFFLIN-ST. JEOR: SCORE: 1652.55

## 2019-11-05 NOTE — PROGRESS NOTES
32w0d   No complaints.  Baby is moving well.  Has US tomorrow.   Failed GTT so has been checking BG levels for the past 5 days.  Feeling frustrated b/c some of the sugars do not make sense.   Will be seeing D ED again this week.  Reviewed log and actually > 50% are WNL.  RR

## 2019-11-06 ENCOUNTER — HOSPITAL ENCOUNTER (OUTPATIENT)
Dept: ULTRASOUND IMAGING | Facility: CLINIC | Age: 29
Discharge: HOME | End: 2019-11-06
Attending: OBSTETRICS & GYNECOLOGY | Admitting: OBSTETRICS & GYNECOLOGY
Payer: COMMERCIAL

## 2019-11-06 ENCOUNTER — OFFICE VISIT (OUTPATIENT)
Dept: MATERNAL FETAL MEDICINE | Facility: CLINIC | Age: 29
End: 2019-11-06
Attending: OBSTETRICS & GYNECOLOGY
Payer: COMMERCIAL

## 2019-11-06 DIAGNOSIS — O26.90 PREGNANCY RELATED CONDITION, ANTEPARTUM: ICD-10-CM

## 2019-11-06 DIAGNOSIS — Z36.89 ENCOUNTER FOR ULTRASOUND TO ASSESS FETAL GROWTH: Primary | ICD-10-CM

## 2019-11-06 PROCEDURE — 76816 OB US FOLLOW-UP PER FETUS: CPT

## 2019-11-08 ENCOUNTER — ALLIED HEALTH/NURSE VISIT (OUTPATIENT)
Dept: EDUCATION SERVICES | Facility: CLINIC | Age: 29
End: 2019-11-08
Payer: COMMERCIAL

## 2019-11-08 VITALS — WEIGHT: 202.2 LBS | HEIGHT: 68 IN | BODY MASS INDEX: 30.65 KG/M2

## 2019-11-08 DIAGNOSIS — O24.419 GDM (GESTATIONAL DIABETES MELLITUS): Primary | ICD-10-CM

## 2019-11-08 PROCEDURE — G0108 DIAB MANAGE TRN  PER INDIV: HCPCS

## 2019-11-08 ASSESSMENT — MIFFLIN-ST. JEOR: SCORE: 1676.7

## 2019-11-08 NOTE — PROGRESS NOTES
"Diabetes Self-Management Education & Support  Gestational Diabetes Self-Management Education & Support    SUBJECTIVE/OBJECTIVE:  Presents for education related to gestational diabetes.    Accompanied by: Self  Diabetes management related comments/concerns: Says frustrating - not seeing any patterns or trends and is taking this very seriously.  Says ate healthy before pregnancy so not have to change diet alot.  Says controlling portions and doing math for carbs.    Cultural Influences/Ethnic Background:  American    Ht 1.721 m (5' 7.75\")   Wt 91.7 kg (202 lb 3.2 oz)   LMP 2019   BMI 30.97 kg/m      Weight gain 6.2 lbs at 32.5 weeks gestation.    Estimated Date of Delivery: Dec 31, 2019    Blood Glucose/Ketone Log:            Wakes: 6am  B: 7-7:30am  L: 12-1pm  D: 6:30-7pm  HS: 10pm: Not snacking overnight    Beverages: water all day, coffee AM and small diet pop 1-2x/day (<8oz).    Lifestyle and Health Behaviors:  Pre-pregnancy weight (lbs): 196  Exercise:: Yes  Days per week of moderate to strenuous exercise (like a brisk walk): 5  On average, minutes per day of exercise at this level: 10  How intense was your typical exercise? : Light (like stretching or slow walking)  Exercise Minutes per Week: 50  Barrier to exercise: Other(weather)  Meals include: Breakfast, Lunch, Dinner, Snacks  Beverages: Water, Coffee, Diet soda  Cultural/Nondenominational diet restrictions?: No  Biggest challenges to healthy eating: Portion control  Pre-antwon vitamin?: Yes    Healthy Coping:  Emotional response to diabetes: Acceptance, Ready to learn  Informal Support system:: Spouse  Stage of change: ACTION (Actively working towards change)    Current Management:  Taking medications for gestational diabetes?: No  Difficulty affording diabetes management supplies?: No    ASSESSMENT:  Ketones: trace.   Fasting blood glucoses: 78% in target.  After breakfast: 56% in target.  After lunch: 75% in target.  After dinner: 100% in " target.    Reviewed diet recall and blood glucose values.  Saw elevated fasting blood glucose morning after being low in carbohydrate with dinner (about 15 gm) so encouraged Karla to aim for at least 30-45 gm with dinner.  Suspect too little carbohydrate with dinner may have contributed to more overnight glucose production.  Normally has snacks between meals and protein with bedtime snacks.      Seeing most elevated blood glucose after breakfast.  Says oat bread has 13 gm per slice but is wide pan.  Recommended she double check on carbohydrates and if correct, try adding more protein (only has thin layer peanut butter in the morning).  Suggested adding more PB, cheese or egg and see if this helps.  If bread actually providing closer to 45 gm, could try to reduce to only 30 gm carbohydrate.  Also reviewed light activity after meals to improve control.  She verbalizes understanding.    Seeing a few elevated blood glucose after lunch but tends to eat same thing most days (sandwich, cheese and veggies).  Sounds like adequate protein with 3-4 slices thicker turkey so suggested adding a few more carbohydrates.  If bread is only 26 gm for 2 slices and patient active, may do better with more carbohydrate.  Suspect increasing carbohydrate intake will also help get ketones negative.    Incase above changes not enough to bring blood glucose to target, did instruct on insulin use today but not plan to start since some diet changes may help.  Insulin injection technique taught using a Pen for rapid-acting insulin possibly at breakfast. Patient verbalized understanding and was able to perform an accurate return demonstration of injection technique.  Discussed storage, sharps, new needle each use, site selection, action of insulin and hypoglycemia signs, symptoms and treatment.  Did not instruct on Vial and Syringe use nor provide voucher at this time since not appear that NPH is needed right now.  Can always provide for  patient to  later if needed; says not been on insulin in the past so should be eligible for Meenakshi Voucher if NPH is needed.  Pt verbalized understanding of concepts discussed and recommendations provided.           INTERVENTION:  Educational topics covered today:  What to expect after delivery, Insulin Use, Future testing for Type 2 diabetes (2 hour OGTT at 6 week post-partum check-up and annual fasting blood glucose level), Risk of GDM and planning ahead for future pregnancies, Recommended lifestyle interventions for reducing the risk of Type 2 Diabetes, When to Call a Diabetes Educator or OB Provider    Educational Materials provided today:  Beau Preventing Diabetes  Hypoglycemia    PLAN:  Check glucose 4 times daily.  Continue to check ketones daily and ok to reduce to once a week when readings are consistently negative.  Continue with recommended physical activity.  Continue to follow recommended meal plan: 2 carbs at breakfast, 3-4 carbs at lunch, 2-3 carbs at supper, 1-2 carbs at snacks.  Follow consistent CHO meal plan, eat CHO and protein/fat at all meals/snacks.    Call/e-mail/MyChart message diabetes educator if 3 or more blood sugars are above the goal in 1 week or if ketones are positive.    Siomara Coombs RD, LD, CDE   Time Spent: 50 minutes  Encounter Type: Individual    Any diabetes medication dose changes were made via the CDE Protocol and Collaborative Practice Agreement with the patient's referring provider. A copy of this encounter was shared with the provider.

## 2019-11-08 NOTE — PATIENT INSTRUCTIONS
"1. Aim for limiting 30 gm at breakfast (double check bread to make sure 30 gm for 2 slices) and more protein (2oz).  Lunch: try 45 gm carbs  Dinner: at least 30 gm    2. Since higher blood glucose in the morning, if more protein does not help try to move around 10-15 minutes after breakfast.     3. Continue to check for ketones daily until seeing negative results for 1 week.     Taking Insulin:    1. Would take Humalog or NovoLog about 10-15 minutes before breakfast (will let you know amount if it is needed)    - Do a 2 unit \"prime\" before each injection, be sure a stream of insulin comes out of the needle before you give your injection.    - After you inject, hold the needle under the skin to the count of 10 to be sure all of the insulin goes in.     - Rotate injection sites, keeping at least 1 inch apart from last injection site and 2 inches away from belly button or surgical scars.    2. Pen - Use a new pen needle for each injection. Always remove pen needle from the insulin pen after use and do not store insulin pens with the needle on the pen.     3. Store insulin you are not using in the refrigerator (do not freeze). Take new insulin out of the refrigerator a few hours prior to use to bring to room temperature.     4. Once opened Humalog or Novolog can be kept at room temperature for 28 days after opened.. Do not use the opened insulin after this time has passed, even if there is still medicine inside.     5. Always carry your blood sugar meter and a sugar source like glucose tablets with you in case of a low blood sugar. Treat a low blood sugar (less than 70) with 15 grams of carbohydrate (1 carb choice). Wait 15 minutes, recheck blood sugar. If blood sugar is still below 70, repeat the treatment.    Follow-up: Call ([phone]) or send [place] message to educator with blood sugar readings in 1 week (sooner if you have 3 elevated blood glucose- then reach out after 3rd elevated result).     Siomara Coombs RD, " HANK, [facility] [place], [place]   [phone]

## 2019-11-18 ENCOUNTER — PATIENT OUTREACH (OUTPATIENT)
Dept: EDUCATION SERVICES | Facility: CLINIC | Age: 29
End: 2019-11-18
Payer: COMMERCIAL

## 2019-11-18 NOTE — PROGRESS NOTES
GDM BG report; ketones have been trace everyday reported here    Date BB AB AL Annie  11-9 88 100 88 127  10 90 99 115 105  11 100 124 116 126  12 95 98 118 114  13 93 101 125 122  14 93 122 - 129  15 96 136 132 88  16 91 105 131 122  17 94 133 - 93  18 90 100

## 2019-11-18 NOTE — PROGRESS NOTES
Gestational Diabetes Follow-up    Subjective/Objective:    Karla Gordon sent in blood glucose log for review. Last date of communication was: 11/8/2019.    Gestational diabetes is being managed with diet and activity    Taking diabetes medications: no    Estimated Date of Delivery: Dec 31, 2019    BG/Food Log:   Date     BB       AB       AL        Annie  11-9     88        100      88        127  10        90        99        115      105  11        100      124      116      126  12        95        98        118      114  13        93        101      125      122  14        93        122      -           129  15        96        136      132      88  16        91        105      131      122  17        94        133      -           93  18        90        100    Assessment:  Ketones: trace.   Fasting blood glucoses: 80% in target.  After breakfast: 100% in target.  After lunch: 100% in target.  After dinner: 100% in target.    Plan/Response:  No change in plan  Follow-up in 2 weeks.    Vanesa Hunt RD, LD, CDE      Any diabetes medication dose changes were made via the CDE Protocol and Collaborative Practice Agreement with the patient's referring provider. A copy of this encounter was shared with the provider.

## 2019-11-21 ENCOUNTER — PRENATAL OFFICE VISIT (OUTPATIENT)
Dept: OBGYN | Facility: CLINIC | Age: 29
End: 2019-11-21
Payer: COMMERCIAL

## 2019-11-21 VITALS
HEART RATE: 105 BPM | TEMPERATURE: 98.1 F | HEIGHT: 68 IN | OXYGEN SATURATION: 96 % | DIASTOLIC BLOOD PRESSURE: 64 MMHG | BODY MASS INDEX: 30.92 KG/M2 | WEIGHT: 204 LBS | SYSTOLIC BLOOD PRESSURE: 105 MMHG

## 2019-11-21 DIAGNOSIS — Z23 NEED FOR PROPHYLACTIC VACCINATION AND INOCULATION AGAINST INFLUENZA: ICD-10-CM

## 2019-11-21 DIAGNOSIS — O09.899 SUPERVISION OF OTHER HIGH RISK PREGNANCY, ANTEPARTUM: Primary | ICD-10-CM

## 2019-11-21 DIAGNOSIS — O24.410 DIET CONTROLLED GESTATIONAL DIABETES MELLITUS (GDM) IN THIRD TRIMESTER: ICD-10-CM

## 2019-11-21 PROCEDURE — 99207 ZZC PRENATAL VISIT: CPT | Performed by: OBSTETRICS & GYNECOLOGY

## 2019-11-21 PROCEDURE — 90471 IMMUNIZATION ADMIN: CPT | Performed by: OBSTETRICS & GYNECOLOGY

## 2019-11-21 PROCEDURE — 90686 IIV4 VACC NO PRSV 0.5 ML IM: CPT | Performed by: OBSTETRICS & GYNECOLOGY

## 2019-11-21 ASSESSMENT — MIFFLIN-ST. JEOR: SCORE: 1684.87

## 2019-11-21 NOTE — PROGRESS NOTES
Doing well, just tired. Flu shot today.   Good fetal movement.   Blood sugars normal with rare outliers.   Growth US in 2 weeks.  RTC 2 weeks.

## 2019-11-21 NOTE — PROGRESS NOTES
Clinic Administered Medication Documentation    MEDICATION LIST:   Injectable Medication Documentation    Patient was given flu. Prior to medication administration, verified patients identity using patient s name and date of birth. Please see MAR and medication order for additional information. Patient instructed to remain in clinic for 15 minutes and report any adverse reaction to staff immediately .      Was entire vial of medication used? Yes  Vial/Syringe: Syringe  Expiration Date:  6/30/2020  Was this medication supplied by the patient? No

## 2019-12-03 ENCOUNTER — PRENATAL OFFICE VISIT (OUTPATIENT)
Dept: OBGYN | Facility: CLINIC | Age: 29
End: 2019-12-03
Payer: COMMERCIAL

## 2019-12-03 VITALS
DIASTOLIC BLOOD PRESSURE: 71 MMHG | HEIGHT: 68 IN | OXYGEN SATURATION: 96 % | BODY MASS INDEX: 31.31 KG/M2 | HEART RATE: 97 BPM | WEIGHT: 206.6 LBS | TEMPERATURE: 98.8 F | SYSTOLIC BLOOD PRESSURE: 113 MMHG

## 2019-12-03 DIAGNOSIS — O09.899 SUPERVISION OF OTHER HIGH RISK PREGNANCY, ANTEPARTUM: Primary | ICD-10-CM

## 2019-12-03 LAB
HGB BLD-MCNC: 9.9 G/DL (ref 11.7–15.7)
PLATELET # BLD AUTO: 246 10E9/L (ref 150–450)

## 2019-12-03 PROCEDURE — 99207 ZZC PRENATAL VISIT: CPT | Performed by: OBSTETRICS & GYNECOLOGY

## 2019-12-03 PROCEDURE — 85049 AUTOMATED PLATELET COUNT: CPT | Performed by: OBSTETRICS & GYNECOLOGY

## 2019-12-03 PROCEDURE — 36415 COLL VENOUS BLD VENIPUNCTURE: CPT | Performed by: OBSTETRICS & GYNECOLOGY

## 2019-12-03 PROCEDURE — 87653 STREP B DNA AMP PROBE: CPT | Performed by: OBSTETRICS & GYNECOLOGY

## 2019-12-03 PROCEDURE — 00000218 ZZHCL STATISTIC OBHBG - HEMOGLOBIN: Performed by: OBSTETRICS & GYNECOLOGY

## 2019-12-03 PROCEDURE — 87186 SC STD MICRODIL/AGAR DIL: CPT | Performed by: OBSTETRICS & GYNECOLOGY

## 2019-12-03 ASSESSMENT — ANXIETY QUESTIONNAIRES
GAD7 TOTAL SCORE: 2
IF YOU CHECKED OFF ANY PROBLEMS ON THIS QUESTIONNAIRE, HOW DIFFICULT HAVE THESE PROBLEMS MADE IT FOR YOU TO DO YOUR WORK, TAKE CARE OF THINGS AT HOME, OR GET ALONG WITH OTHER PEOPLE: SOMEWHAT DIFFICULT
5. BEING SO RESTLESS THAT IT IS HARD TO SIT STILL: NOT AT ALL
2. NOT BEING ABLE TO STOP OR CONTROL WORRYING: NOT AT ALL
1. FEELING NERVOUS, ANXIOUS, OR ON EDGE: SEVERAL DAYS
7. FEELING AFRAID AS IF SOMETHING AWFUL MIGHT HAPPEN: NOT AT ALL
3. WORRYING TOO MUCH ABOUT DIFFERENT THINGS: NOT AT ALL
6. BECOMING EASILY ANNOYED OR IRRITABLE: NOT AT ALL

## 2019-12-03 ASSESSMENT — MIFFLIN-ST. JEOR: SCORE: 1696.66

## 2019-12-03 ASSESSMENT — PATIENT HEALTH QUESTIONNAIRE - PHQ9
SUM OF ALL RESPONSES TO PHQ QUESTIONS 1-9: 5
5. POOR APPETITE OR OVEREATING: SEVERAL DAYS

## 2019-12-03 NOTE — PROGRESS NOTES
36w0d  No complaints.  Baby is moving well.  Started having cramping about 3 days ago.  Still on and off.   BG levels are pretty well controlled except for some mornings will get as high as 109.   Lunch and dinner glucoses are normal.  GBS and hgb today. Has US with MFM tomorrow.   Baby will see Dr Blas.  Planning on breast feeding but had supply problems first time.  RR

## 2019-12-04 ENCOUNTER — OFFICE VISIT (OUTPATIENT)
Dept: MATERNAL FETAL MEDICINE | Facility: CLINIC | Age: 29
End: 2019-12-04
Attending: OBSTETRICS & GYNECOLOGY
Payer: COMMERCIAL

## 2019-12-04 ENCOUNTER — HOSPITAL ENCOUNTER (OUTPATIENT)
Dept: ULTRASOUND IMAGING | Facility: CLINIC | Age: 29
Discharge: HOME | End: 2019-12-04
Attending: OBSTETRICS & GYNECOLOGY | Admitting: OBSTETRICS & GYNECOLOGY
Payer: COMMERCIAL

## 2019-12-04 DIAGNOSIS — O24.410 DIET CONTROLLED GESTATIONAL DIABETES MELLITUS (GDM) IN THIRD TRIMESTER: Primary | ICD-10-CM

## 2019-12-04 DIAGNOSIS — O26.90 PREGNANCY RELATED CONDITION, ANTEPARTUM: ICD-10-CM

## 2019-12-04 LAB
GP B STREP DNA SPEC QL NAA+PROBE: POSITIVE
SPECIMEN SOURCE: ABNORMAL

## 2019-12-04 PROCEDURE — 76816 OB US FOLLOW-UP PER FETUS: CPT

## 2019-12-04 ASSESSMENT — ANXIETY QUESTIONNAIRES: GAD7 TOTAL SCORE: 2

## 2019-12-09 NOTE — PROGRESS NOTES
"Please see \"Imaging\" tab under \"Chart Review\" for details of today's visit.    Rachael Gill    "

## 2019-12-10 ENCOUNTER — PRENATAL OFFICE VISIT (OUTPATIENT)
Dept: OBGYN | Facility: CLINIC | Age: 29
End: 2019-12-10
Payer: COMMERCIAL

## 2019-12-10 VITALS
TEMPERATURE: 98.7 F | HEIGHT: 68 IN | BODY MASS INDEX: 31.43 KG/M2 | WEIGHT: 207.4 LBS | HEART RATE: 96 BPM | SYSTOLIC BLOOD PRESSURE: 112 MMHG | DIASTOLIC BLOOD PRESSURE: 71 MMHG | OXYGEN SATURATION: 98 %

## 2019-12-10 DIAGNOSIS — O24.410 DIET CONTROLLED GESTATIONAL DIABETES MELLITUS (GDM) IN THIRD TRIMESTER: ICD-10-CM

## 2019-12-10 DIAGNOSIS — O09.899 SUPERVISION OF OTHER HIGH RISK PREGNANCY, ANTEPARTUM: Primary | ICD-10-CM

## 2019-12-10 LAB
BACTERIA SPEC CULT: ABNORMAL
SPECIMEN SOURCE: ABNORMAL

## 2019-12-10 PROCEDURE — 99207 ZZC PRENATAL VISIT: CPT | Performed by: OBSTETRICS & GYNECOLOGY

## 2019-12-10 ASSESSMENT — MIFFLIN-ST. JEOR: SCORE: 1700.29

## 2019-12-10 NOTE — PROGRESS NOTES
37w0d  No complaints.  Lots of ctx's.  Nothing regular, though.  Normal FM.   discussed IOL by due date. MFM US shows normal growth at 27%. Cephalic.    GBS positive, discussed.  RR

## 2019-12-17 ENCOUNTER — PRENATAL OFFICE VISIT (OUTPATIENT)
Dept: OBGYN | Facility: CLINIC | Age: 29
End: 2019-12-17
Payer: COMMERCIAL

## 2019-12-17 VITALS
BODY MASS INDEX: 31.4 KG/M2 | HEART RATE: 101 BPM | SYSTOLIC BLOOD PRESSURE: 114 MMHG | OXYGEN SATURATION: 97 % | TEMPERATURE: 98.4 F | HEIGHT: 68 IN | DIASTOLIC BLOOD PRESSURE: 72 MMHG | WEIGHT: 207.2 LBS

## 2019-12-17 DIAGNOSIS — O09.899 SUPERVISION OF OTHER HIGH RISK PREGNANCY, ANTEPARTUM: Primary | ICD-10-CM

## 2019-12-17 DIAGNOSIS — O24.410 DIET CONTROLLED GESTATIONAL DIABETES MELLITUS (GDM) IN THIRD TRIMESTER: ICD-10-CM

## 2019-12-17 PROCEDURE — 99207 ZZC PRENATAL VISIT: CPT | Performed by: OBSTETRICS & GYNECOLOGY

## 2019-12-17 ASSESSMENT — MIFFLIN-ST. JEOR: SCORE: 1699.38

## 2019-12-17 NOTE — PROGRESS NOTES
38w0d  No complaints.  Baby is moving well.  Reports all BG levels normal since thanksgiving. Did not bring log book.   Patient scheduled for IOL on 12/29.  discussed contraception for afterwards and patient very adamant about no IUD.   Wants oral contraceptive pills.  discussed progesterone pills while breast feeding.   Will follow up with Dr Moore for primary care after delivery.  discussed need to stop smoking and normal liver function prior to taking regular oral contraceptive pills. RR

## 2019-12-29 ENCOUNTER — HOSPITAL ENCOUNTER (INPATIENT)
Facility: CLINIC | Age: 29
LOS: 2 days | Discharge: HOME HEALTH | End: 2019-12-31
Attending: OBSTETRICS & GYNECOLOGY | Admitting: OBSTETRICS & GYNECOLOGY
Payer: COMMERCIAL

## 2019-12-29 ENCOUNTER — ANESTHESIA EVENT (OUTPATIENT)
Dept: OBGYN | Facility: CLINIC | Age: 29
End: 2019-12-29
Payer: COMMERCIAL

## 2019-12-29 ENCOUNTER — ANESTHESIA (OUTPATIENT)
Dept: OBGYN | Facility: CLINIC | Age: 29
End: 2019-12-29
Payer: COMMERCIAL

## 2019-12-29 DIAGNOSIS — F32.A DEPRESSION, UNSPECIFIED DEPRESSION TYPE: ICD-10-CM

## 2019-12-29 DIAGNOSIS — D53.9 MACROCYTIC ANEMIA: ICD-10-CM

## 2019-12-29 DIAGNOSIS — K70.11 ALCOHOLIC HEPATITIS WITH ASCITES (H): ICD-10-CM

## 2019-12-29 DIAGNOSIS — R91.8 OPACITY OF LUNG ON IMAGING STUDY: ICD-10-CM

## 2019-12-29 DIAGNOSIS — I34.0 MILD MITRAL REGURGITATION BY PRIOR ECHOCARDIOGRAM: ICD-10-CM

## 2019-12-29 PROBLEM — O24.419 GESTATIONAL DIABETES: Status: ACTIVE | Noted: 2019-12-29

## 2019-12-29 PROBLEM — Z23 NEED FOR TDAP VACCINATION: Status: RESOLVED | Noted: 2019-10-24 | Resolved: 2019-12-29

## 2019-12-29 LAB
ABO + RH BLD: NORMAL
ABO + RH BLD: NORMAL
ALT SERPL W P-5'-P-CCNC: 21 U/L (ref 0–50)
AMPHETAMINES UR QL SCN: NEGATIVE
AST SERPL W P-5'-P-CCNC: 17 U/L (ref 0–45)
BLD GP AB SCN SERPL QL: NORMAL
BLOOD BANK CMNT PATIENT-IMP: NORMAL
CANNABINOIDS UR QL: NEGATIVE
COCAINE UR QL: NEGATIVE
ERYTHROCYTE [DISTWIDTH] IN BLOOD BY AUTOMATED COUNT: 13.1 % (ref 10–15)
FERRITIN SERPL-MCNC: 37 NG/ML (ref 12–150)
GLUCOSE BLDC GLUCOMTR-MCNC: 100 MG/DL (ref 70–99)
GLUCOSE BLDC GLUCOMTR-MCNC: 111 MG/DL (ref 70–99)
GLUCOSE BLDC GLUCOMTR-MCNC: 73 MG/DL (ref 70–99)
GLUCOSE BLDC GLUCOMTR-MCNC: 78 MG/DL (ref 70–99)
HCT VFR BLD AUTO: 30.5 % (ref 35–47)
HGB BLD-MCNC: 10 G/DL (ref 11.7–15.7)
INR PPP: 0.91 (ref 0.86–1.14)
MCH RBC QN AUTO: 29.7 PG (ref 26.5–33)
MCHC RBC AUTO-ENTMCNC: 32.8 G/DL (ref 31.5–36.5)
MCV RBC AUTO: 91 FL (ref 78–100)
OPIATES UR QL SCN: NEGATIVE
PCP UR QL SCN: NEGATIVE
PLATELET # BLD AUTO: 203 10E9/L (ref 150–450)
RBC # BLD AUTO: 3.37 10E12/L (ref 3.8–5.2)
SPECIMEN EXP DATE BLD: NORMAL
T PALLIDUM AB SER QL: NONREACTIVE
WBC # BLD AUTO: 14.3 10E9/L (ref 4–11)

## 2019-12-29 PROCEDURE — 0HQ9XZZ REPAIR PERINEUM SKIN, EXTERNAL APPROACH: ICD-10-PCS | Performed by: OBSTETRICS & GYNECOLOGY

## 2019-12-29 PROCEDURE — 59400 OBSTETRICAL CARE: CPT | Performed by: OBSTETRICS & GYNECOLOGY

## 2019-12-29 PROCEDURE — 00HU33Z INSERTION OF INFUSION DEVICE INTO SPINAL CANAL, PERCUTANEOUS APPROACH: ICD-10-PCS | Performed by: STUDENT IN AN ORGANIZED HEALTH CARE EDUCATION/TRAINING PROGRAM

## 2019-12-29 PROCEDURE — 12000001 ZZH R&B MED SURG/OB UMMC

## 2019-12-29 PROCEDURE — 84450 TRANSFERASE (AST) (SGOT): CPT | Performed by: STUDENT IN AN ORGANIZED HEALTH CARE EDUCATION/TRAINING PROGRAM

## 2019-12-29 PROCEDURE — 25000125 ZZHC RX 250

## 2019-12-29 PROCEDURE — 86803 HEPATITIS C AB TEST: CPT | Performed by: STUDENT IN AN ORGANIZED HEALTH CARE EDUCATION/TRAINING PROGRAM

## 2019-12-29 PROCEDURE — 86780 TREPONEMA PALLIDUM: CPT | Performed by: STUDENT IN AN ORGANIZED HEALTH CARE EDUCATION/TRAINING PROGRAM

## 2019-12-29 PROCEDURE — 80307 DRUG TEST PRSMV CHEM ANLYZR: CPT | Performed by: OBSTETRICS & GYNECOLOGY

## 2019-12-29 PROCEDURE — 86850 RBC ANTIBODY SCREEN: CPT | Performed by: STUDENT IN AN ORGANIZED HEALTH CARE EDUCATION/TRAINING PROGRAM

## 2019-12-29 PROCEDURE — 10907ZC DRAINAGE OF AMNIOTIC FLUID, THERAPEUTIC FROM PRODUCTS OF CONCEPTION, VIA NATURAL OR ARTIFICIAL OPENING: ICD-10-PCS | Performed by: OBSTETRICS & GYNECOLOGY

## 2019-12-29 PROCEDURE — 25000125 ZZHC RX 250: Performed by: STUDENT IN AN ORGANIZED HEALTH CARE EDUCATION/TRAINING PROGRAM

## 2019-12-29 PROCEDURE — 25000132 ZZH RX MED GY IP 250 OP 250 PS 637: Performed by: STUDENT IN AN ORGANIZED HEALTH CARE EDUCATION/TRAINING PROGRAM

## 2019-12-29 PROCEDURE — 72200001 ZZH LABOR CARE VAGINAL DELIVERY SINGLE

## 2019-12-29 PROCEDURE — 86900 BLOOD TYPING SEROLOGIC ABO: CPT | Performed by: STUDENT IN AN ORGANIZED HEALTH CARE EDUCATION/TRAINING PROGRAM

## 2019-12-29 PROCEDURE — 59025 FETAL NON-STRESS TEST: CPT | Mod: 26 | Performed by: OBSTETRICS & GYNECOLOGY

## 2019-12-29 PROCEDURE — G0499 HEPB SCREEN HIGH RISK INDIV: HCPCS | Performed by: STUDENT IN AN ORGANIZED HEALTH CARE EDUCATION/TRAINING PROGRAM

## 2019-12-29 PROCEDURE — 86901 BLOOD TYPING SEROLOGIC RH(D): CPT | Performed by: STUDENT IN AN ORGANIZED HEALTH CARE EDUCATION/TRAINING PROGRAM

## 2019-12-29 PROCEDURE — 3E0R3BZ INTRODUCTION OF ANESTHETIC AGENT INTO SPINAL CANAL, PERCUTANEOUS APPROACH: ICD-10-PCS | Performed by: STUDENT IN AN ORGANIZED HEALTH CARE EDUCATION/TRAINING PROGRAM

## 2019-12-29 PROCEDURE — 00000146 ZZHCL STATISTIC GLUCOSE BY METER IP

## 2019-12-29 PROCEDURE — 84460 ALANINE AMINO (ALT) (SGPT): CPT | Performed by: STUDENT IN AN ORGANIZED HEALTH CARE EDUCATION/TRAINING PROGRAM

## 2019-12-29 PROCEDURE — 25800030 ZZH RX IP 258 OP 636: Performed by: STUDENT IN AN ORGANIZED HEALTH CARE EDUCATION/TRAINING PROGRAM

## 2019-12-29 PROCEDURE — 85027 COMPLETE CBC AUTOMATED: CPT | Performed by: STUDENT IN AN ORGANIZED HEALTH CARE EDUCATION/TRAINING PROGRAM

## 2019-12-29 PROCEDURE — 25000128 H RX IP 250 OP 636: Performed by: STUDENT IN AN ORGANIZED HEALTH CARE EDUCATION/TRAINING PROGRAM

## 2019-12-29 PROCEDURE — 36415 COLL VENOUS BLD VENIPUNCTURE: CPT | Performed by: STUDENT IN AN ORGANIZED HEALTH CARE EDUCATION/TRAINING PROGRAM

## 2019-12-29 PROCEDURE — 82728 ASSAY OF FERRITIN: CPT | Performed by: STUDENT IN AN ORGANIZED HEALTH CARE EDUCATION/TRAINING PROGRAM

## 2019-12-29 PROCEDURE — 85610 PROTHROMBIN TIME: CPT | Performed by: STUDENT IN AN ORGANIZED HEALTH CARE EDUCATION/TRAINING PROGRAM

## 2019-12-29 RX ORDER — LIDOCAINE HYDROCHLORIDE AND EPINEPHRINE 15; 5 MG/ML; UG/ML
INJECTION, SOLUTION EPIDURAL PRN
Status: DISCONTINUED | OUTPATIENT
Start: 2019-12-29 | End: 2019-12-30 | Stop reason: HOSPADM

## 2019-12-29 RX ORDER — NALOXONE HYDROCHLORIDE 0.4 MG/ML
.1-.4 INJECTION, SOLUTION INTRAMUSCULAR; INTRAVENOUS; SUBCUTANEOUS
Status: DISCONTINUED | OUTPATIENT
Start: 2019-12-29 | End: 2019-12-30

## 2019-12-29 RX ORDER — SODIUM CHLORIDE 9 MG/ML
INJECTION, SOLUTION INTRAVENOUS CONTINUOUS
Status: DISCONTINUED | OUTPATIENT
Start: 2019-12-29 | End: 2019-12-30

## 2019-12-29 RX ORDER — METHYLERGONOVINE MALEATE 0.2 MG/ML
200 INJECTION INTRAVENOUS
Status: DISCONTINUED | OUTPATIENT
Start: 2019-12-29 | End: 2019-12-30

## 2019-12-29 RX ORDER — OXYTOCIN/0.9 % SODIUM CHLORIDE 30/500 ML
100-340 PLASTIC BAG, INJECTION (ML) INTRAVENOUS CONTINUOUS PRN
Status: COMPLETED | OUTPATIENT
Start: 2019-12-29 | End: 2019-12-29

## 2019-12-29 RX ORDER — SERTRALINE HYDROCHLORIDE 25 MG/1
25 TABLET, FILM COATED ORAL DAILY
Status: DISCONTINUED | OUTPATIENT
Start: 2019-12-29 | End: 2019-12-29

## 2019-12-29 RX ORDER — NALBUPHINE HYDROCHLORIDE 10 MG/ML
2.5-5 INJECTION, SOLUTION INTRAMUSCULAR; INTRAVENOUS; SUBCUTANEOUS EVERY 6 HOURS PRN
Status: DISCONTINUED | OUTPATIENT
Start: 2019-12-29 | End: 2019-12-31 | Stop reason: HOSPADM

## 2019-12-29 RX ORDER — SODIUM CHLORIDE, SODIUM LACTATE, POTASSIUM CHLORIDE, CALCIUM CHLORIDE 600; 310; 30; 20 MG/100ML; MG/100ML; MG/100ML; MG/100ML
INJECTION, SOLUTION INTRAVENOUS CONTINUOUS
Status: DISCONTINUED | OUTPATIENT
Start: 2019-12-29 | End: 2019-12-30

## 2019-12-29 RX ORDER — MISOPROSTOL 100 UG/1
25 TABLET ORAL
Status: DISCONTINUED | OUTPATIENT
Start: 2019-12-29 | End: 2019-12-30

## 2019-12-29 RX ORDER — OXYTOCIN/0.9 % SODIUM CHLORIDE 30/500 ML
PLASTIC BAG, INJECTION (ML) INTRAVENOUS
Status: COMPLETED
Start: 2019-12-29 | End: 2019-12-29

## 2019-12-29 RX ORDER — ONDANSETRON 2 MG/ML
4 INJECTION INTRAMUSCULAR; INTRAVENOUS EVERY 6 HOURS PRN
Status: DISCONTINUED | OUTPATIENT
Start: 2019-12-29 | End: 2019-12-30

## 2019-12-29 RX ORDER — FENTANYL CITRATE 50 UG/ML
50-100 INJECTION, SOLUTION INTRAMUSCULAR; INTRAVENOUS
Status: DISCONTINUED | OUTPATIENT
Start: 2019-12-29 | End: 2019-12-30

## 2019-12-29 RX ORDER — EPHEDRINE SULFATE 50 MG/ML
5 INJECTION, SOLUTION INTRAMUSCULAR; INTRAVENOUS; SUBCUTANEOUS
Status: DISCONTINUED | OUTPATIENT
Start: 2019-12-29 | End: 2019-12-31 | Stop reason: HOSPADM

## 2019-12-29 RX ORDER — LIDOCAINE HYDROCHLORIDE 10 MG/ML
INJECTION, SOLUTION EPIDURAL; INFILTRATION; INTRACAUDAL; PERINEURAL
Status: DISCONTINUED
Start: 2019-12-29 | End: 2019-12-29 | Stop reason: WASHOUT

## 2019-12-29 RX ORDER — OXYTOCIN 10 [USP'U]/ML
10 INJECTION, SOLUTION INTRAMUSCULAR; INTRAVENOUS
Status: DISCONTINUED | OUTPATIENT
Start: 2019-12-29 | End: 2019-12-30

## 2019-12-29 RX ORDER — CARBOPROST TROMETHAMINE 250 UG/ML
250 INJECTION, SOLUTION INTRAMUSCULAR
Status: DISCONTINUED | OUTPATIENT
Start: 2019-12-29 | End: 2019-12-30

## 2019-12-29 RX ORDER — IBUPROFEN 800 MG/1
800 TABLET, FILM COATED ORAL
Status: COMPLETED | OUTPATIENT
Start: 2019-12-29 | End: 2019-12-29

## 2019-12-29 RX ORDER — LIDOCAINE 40 MG/G
CREAM TOPICAL
Status: DISCONTINUED | OUTPATIENT
Start: 2019-12-29 | End: 2019-12-30

## 2019-12-29 RX ORDER — DEXTROSE MONOHYDRATE 25 G/50ML
25-50 INJECTION, SOLUTION INTRAVENOUS
Status: DISCONTINUED | OUTPATIENT
Start: 2019-12-29 | End: 2019-12-30

## 2019-12-29 RX ORDER — DEXTROSE, SODIUM CHLORIDE, SODIUM LACTATE, POTASSIUM CHLORIDE, AND CALCIUM CHLORIDE 5; .6; .31; .03; .02 G/100ML; G/100ML; G/100ML; G/100ML; G/100ML
INJECTION, SOLUTION INTRAVENOUS CONTINUOUS PRN
Status: DISCONTINUED | OUTPATIENT
Start: 2019-12-29 | End: 2019-12-30

## 2019-12-29 RX ORDER — OXYTOCIN/0.9 % SODIUM CHLORIDE 30/500 ML
1-24 PLASTIC BAG, INJECTION (ML) INTRAVENOUS CONTINUOUS
Status: DISCONTINUED | OUTPATIENT
Start: 2019-12-29 | End: 2019-12-30

## 2019-12-29 RX ORDER — MISOPROSTOL 100 UG/1
25 TABLET ORAL EVERY 4 HOURS PRN
Status: DISCONTINUED | OUTPATIENT
Start: 2019-12-29 | End: 2019-12-30

## 2019-12-29 RX ORDER — OXYCODONE AND ACETAMINOPHEN 5; 325 MG/1; MG/1
1 TABLET ORAL
Status: DISCONTINUED | OUTPATIENT
Start: 2019-12-29 | End: 2019-12-30

## 2019-12-29 RX ORDER — NALOXONE HYDROCHLORIDE 0.4 MG/ML
.1-.4 INJECTION, SOLUTION INTRAMUSCULAR; INTRAVENOUS; SUBCUTANEOUS
Status: DISCONTINUED | OUTPATIENT
Start: 2019-12-29 | End: 2019-12-29

## 2019-12-29 RX ORDER — MISOPROSTOL 200 UG/1
TABLET ORAL
Status: DISCONTINUED
Start: 2019-12-29 | End: 2019-12-29 | Stop reason: HOSPADM

## 2019-12-29 RX ORDER — PENICILLIN G POTASSIUM 5000000 [IU]/1
5 INJECTION, POWDER, FOR SOLUTION INTRAMUSCULAR; INTRAVENOUS ONCE
Status: COMPLETED | OUTPATIENT
Start: 2019-12-29 | End: 2019-12-29

## 2019-12-29 RX ORDER — NICOTINE POLACRILEX 4 MG
15-30 LOZENGE BUCCAL
Status: DISCONTINUED | OUTPATIENT
Start: 2019-12-29 | End: 2019-12-30

## 2019-12-29 RX ORDER — OXYTOCIN 10 [USP'U]/ML
INJECTION, SOLUTION INTRAMUSCULAR; INTRAVENOUS
Status: DISCONTINUED
Start: 2019-12-29 | End: 2019-12-29 | Stop reason: WASHOUT

## 2019-12-29 RX ORDER — ACETAMINOPHEN 325 MG/1
650 TABLET ORAL EVERY 4 HOURS PRN
Status: DISCONTINUED | OUTPATIENT
Start: 2019-12-29 | End: 2019-12-30

## 2019-12-29 RX ADMIN — Medication 6 ML: at 17:29

## 2019-12-29 RX ADMIN — NICOTINE 1 PATCH: 7 PATCH TRANSDERMAL at 18:11

## 2019-12-29 RX ADMIN — SODIUM CHLORIDE, POTASSIUM CHLORIDE, SODIUM LACTATE AND CALCIUM CHLORIDE: 600; 310; 30; 20 INJECTION, SOLUTION INTRAVENOUS at 15:14

## 2019-12-29 RX ADMIN — Medication 37.5 MG: at 22:01

## 2019-12-29 RX ADMIN — FENTANYL CITRATE 10 ML/HR: 0.05 INJECTION, SOLUTION INTRAMUSCULAR; INTRAVENOUS at 17:30

## 2019-12-29 RX ADMIN — SODIUM CHLORIDE 2.5 MILLION UNITS: 9 INJECTION, SOLUTION INTRAVENOUS at 18:50

## 2019-12-29 RX ADMIN — Medication 25 MCG: at 13:03

## 2019-12-29 RX ADMIN — Medication 25 MCG: at 15:09

## 2019-12-29 RX ADMIN — LIDOCAINE HYDROCHLORIDE,EPINEPHRINE BITARTRATE 3 ML: 15; .005 INJECTION, SOLUTION EPIDURAL; INFILTRATION; INTRACAUDAL; PERINEURAL at 17:20

## 2019-12-29 RX ADMIN — FENTANYL CITRATE: 0.05 INJECTION, SOLUTION INTRAMUSCULAR; INTRAVENOUS at 17:31

## 2019-12-29 RX ADMIN — Medication 340 ML/HR: at 21:36

## 2019-12-29 RX ADMIN — PENICILLIN G POTASSIUM 5 MILLION UNITS: 5000000 POWDER, FOR SOLUTION INTRAMUSCULAR; INTRAPLEURAL; INTRATHECAL; INTRAVENOUS at 15:14

## 2019-12-29 RX ADMIN — IBUPROFEN 800 MG: 800 TABLET, FILM COATED ORAL at 22:00

## 2019-12-29 RX ADMIN — Medication 25 MCG: at 11:00

## 2019-12-29 RX ADMIN — ACETAMINOPHEN 650 MG: 325 TABLET, FILM COATED ORAL at 22:00

## 2019-12-29 RX ADMIN — SODIUM CHLORIDE, POTASSIUM CHLORIDE, SODIUM LACTATE AND CALCIUM CHLORIDE 1000 ML: 600; 310; 30; 20 INJECTION, SOLUTION INTRAVENOUS at 17:00

## 2019-12-29 ASSESSMENT — MIFFLIN-ST. JEOR: SCORE: 1665.03

## 2019-12-29 NOTE — PROGRESS NOTES
"Subjective:   Contractions: not feeling anything  Leakage of fluids: no        Objective:  Patient Vitals for the past 8 hrs:   BP Temp Temp src Resp Height Weight   12/29/19 1234 123/65 98.2  F (36.8  C) Oral 18 -- --   12/29/19 0905 -- -- -- -- 1.689 m (5' 6.5\") 92.5 kg (204 lb)   12/29/19 0826 113/71 98.2  F (36.8  C) Oral 18 -- --     Cervix: jones in place, feels like 3/80/-2  Membranes: intact    Misoprostol 25 mcg placed in posterior fornix    EFM:   160 baseline, moderate variablility, no accels, no decels, Category I  Tocometer: external monitor and frequency q 2-5 minutes    Assessment:/Plan:   Labor: induced with cytotec for   3 doses and jones bulb  Will start PCN now to make sure adequate treatment. Discussed AROM and pitocin after jones out. Plans epidural.     SHAJI PARKS MD    "

## 2019-12-29 NOTE — ANESTHESIA PREPROCEDURE EVALUATION
Anesthesia Pre-Procedure Evaluation    Patient: Karla Gordon   MRN:     [id number] Gender:   female   Age:    29 year old :      1990        Preoperative Diagnosis: * No surgery found *        Past Medical History:   Diagnosis Date     Alcohol use disorder, mild, abuse 2018     Cervical high risk HPV (human papillomavirus) test positive 2019    See problem list.      Dietary folate deficiency anemia 2018     Fatty liver 3/22/2018    3/21/18:  ALT 76,  (4.2 xULN).   18:  Hepatitis B and C negative  Patient also with thrombocytopenia and mild macrocytic anemia  18:  IMPRESSION:     1. Increased hepatic echogenicity as can be seen in intrinsic parenchymal disease such as steatosis. 2. Liver is enlarged, measuring 21.6 cm. 3. Spleen is borderline enlarged, measuring 1.9 cm     Macrocytic anemia 4/3/2018     Thrombocytopenia (H) 3/22/2018    3/21/18:  Platelets noted at 92.   18:  Platelets 92.  Mild macrocytic anemia.  Peripheral smear pending, consider Hematology referral     Tobacco use disorder 3/21/2018      Past Surgical History:   Procedure Laterality Date     ABDOMINAL PARACENTESIS  2019    US Paracentesis with Imaging at [facility].  Fluid removed: 5910 mL     C ANESTH,SHOULDER REPLACEMENT Right 2015    right partial shoulder replacement at age 25     HC TOOTH EXTRACTION W/FORCEP  2015     ORTHOPEDIC SURGERY                 [facility] AN PHYSICAL EXAM    LABS:  CBC:   Lab Results   Component Value Date    WBC 14.3 (H) 2019    WBC 14.4 (H) 2019    HGB 10.0 (L) 2019    HGB 9.9 (L) 2019    HCT 30.5 (L) 2019    HCT 32.1 (L) 2019     2019     2019     BMP:   Lab Results   Component Value Date     2019     (L) 2018    POTASSIUM 4.2 2019    POTASSIUM 4.0 2018    CHLORIDE 104 2019    CHLORIDE 98 2018    CO2 23 2019    CO2 27 2018    BUN 11  "08/20/2019    BUN 2 (L) 12/25/2018    CR 0.41 (L) 08/20/2019    CR 0.39 (L) 12/25/2018    GLC 82 08/20/2019     (H) 12/25/2018     COAGS:   Lab Results   Component Value Date    PTT 31 08/20/2019    INR 0.91 12/29/2019    FIBR 185 (L) 11/17/2018     POC:   Lab Results   Component Value Date     (H) 12/29/2019    HCG Positive (A) 05/06/2019     OTHER:   Lab Results   Component Value Date    LACT 1.2 11/16/2018    SUMANTH 9.4 08/20/2019    MAG 2.4 (H) 11/17/2018    ALBUMIN 3.3 (L) 08/20/2019    PROTTOTAL 7.4 08/20/2019    ALT 21 12/29/2019    AST 17 12/29/2019    ALKPHOS 86 08/20/2019    BILITOTAL 0.2 08/20/2019    TSH 1.51 03/21/2018    CRP 11.0 (H) 11/16/2018     (H) 11/16/2018        Preop Vitals    BP Readings from Last 3 Encounters:   12/29/19 123/65   12/17/19 114/72   12/10/19 112/71    Pulse Readings from Last 3 Encounters:   12/17/19 101   12/10/19 96   12/03/19 97      Resp Readings from Last 3 Encounters:   12/29/19 18   01/18/19 25   12/25/18 16    SpO2 Readings from Last 3 Encounters:   12/17/19 97%   12/10/19 98%   12/03/19 96%      Temp Readings from Last 1 Encounters:   12/29/19 36.8  C (98.2  F) (Oral)    Ht Readings from Last 1 Encounters:   12/29/19 1.689 m (5' 6.5\")      Wt Readings from Last 1 Encounters:   12/29/19 92.5 kg (204 lb)    Estimated body mass index is 32.43 kg/m  as calculated from the following:    Height as of this encounter: 1.689 m (5' 6.5\").    Weight as of this encounter: 92.5 kg (204 lb).     LDA:  Peripheral IV 12/29/19 Left Lower forearm (Active)   Site Assessment WDL except 12/29/2019  4:00 PM   Line Status Infusing 12/29/2019  4:00 PM   Phlebitis Scale 0-->no symptoms 12/29/2019  2:00 PM   Infiltration Scale 0 12/29/2019  2:00 PM   Number of days: 0       Peripheral IV 12/29/19 Right Lower forearm (Active)   Site Assessment WDL 12/29/2019  4:00 PM   Line Status Infusing 12/29/2019  4:00 PM   Phlebitis Scale 0-->no symptoms 12/29/2019  3:30 PM   Infiltration " Scale 0 12/29/2019  3:30 PM   Number of days: 0        Assessment:   ASA SCORE: 2 emergent   H&P: History and physical reviewed and following examination; no interval change.         Plan:   Anes. Type:  Epidural     Epidural Details:  Catheter; Lumbar   Pre-Medication: None   Induction:  N/a   Airway: Native Airway   Access/Monitoring: PIV   Maintenance: N/a     Postop Plan:   Postop Pain: Regional  Postop Sedation/Airway: Not planned  Disposition: Inpatient/Admit     PONV Management: Adult Risk Factors: Female     CONSENT: Direct conversation   Plan and risks discussed with: Patient                      Jameson Boateng MD     ANESTHESIA PREOP EVALUATION    PROCEDURE:     HPI: Karla Gordon is a 29 year old female who presents for above procedure 2/2 labor pain.     PAST MEDICAL HISTORY:    Past Medical History:   Diagnosis Date     Alcohol use disorder, mild, abuse 11/19/2018     Cervical high risk HPV (human papillomavirus) test positive 06/25/2019    See problem list.      Dietary folate deficiency anemia 11/20/2018     Fatty liver 3/22/2018    3/21/18:  ALT 76,  (4.2 xULN).   4/2/18:  Hepatitis B and C negative  Patient also with thrombocytopenia and mild macrocytic anemia  11/16/18:  IMPRESSION:     1. Increased hepatic echogenicity as can be seen in intrinsic parenchymal disease such as steatosis. 2. Liver is enlarged, measuring 21.6 cm. 3. Spleen is borderline enlarged, measuring 1.9 cm     Macrocytic anemia 4/3/2018     Thrombocytopenia (H) 3/22/2018    3/21/18:  Platelets noted at 92.   4/2/18:  Platelets 92.  Mild macrocytic anemia.  Peripheral smear pending, consider Hematology referral     Tobacco use disorder 3/21/2018       PAST SURGICAL HISTORY:    Past Surgical History:   Procedure Laterality Date     ABDOMINAL PARACENTESIS  01/24/2019    US Paracentesis with Imaging at [facility].  Fluid removed: 5910 mL     C ANESTH,SHOULDER REPLACEMENT Right 2015    right partial shoulder replacement  at age 25     HC TOOTH EXTRACTION W/FORCEP       ORTHOPEDIC SURGERY         SOCIAL HISTORY:       Social History     Tobacco Use     Smoking status: Current Every Day Smoker     Packs/day: 0.50     Years: 7.00     Pack years: 3.50     Types: Cigarettes     Smokeless tobacco: Never Used   Substance Use Topics     Alcohol use: Not Currently     Comment: daily, few drinks a day, not during pregnancy       ALLERGIES:     Allergies   Allergen Reactions     Lexapro [Escitalopram] Nausea     Severe nausea; does not wish to take again     Spinach      Mouth gets numb       MEDICATIONS:     Medications Prior to Admission   Medication Sig Dispense Refill Last Dose     BIOTIN PO Take 1 tablet by mouth daily   2019 at Unknown time     FOLIC ACID PO Take 1 mg by mouth daily   2019 at Unknown time     IRON PO    2019 at Unknown time     Multiple Vitamins-Minerals (CENTRUM ULTRA WOMENS PO) Take 1 tablet by mouth daily   2019 at Unknown time     Prenatal Vit-Fe Fumarate-FA (PRENATAL VITAMIN PO)    2019 at Unknown time     sertraline (ZOLOFT) 25 MG tablet   1 2019 at Unknown time     acetone urine (KETOSTIX) test strip Use 1 strip/day with first morning urine until ketones are negative for 7 days in a row, then use 1 strip/week 50 each 1 Taking     blood glucose (CONTOUR NEXT TEST) test strip Use to test blood sugar 4 times daily or as directed. 200 each 3 Taking     blood glucose monitoring (ROBB MICROLET) lancets Use to test blood sugar 4 times daily or as directed. 100 each 3 Taking     [] Blood Glucose Monitoring Suppl (CONTOUR NEXT ONE) KIT 1 each once for 1 dose : [id number], Lot: [id number], Ex: 2020 1 kit 0 Taking     diphenhydrAMINE-acetaminophen (TYLENOL PM)  MG tablet Take 1 tablet by mouth nightly as needed for sleep   Not Taking       No current outpatient medications on file.       Current Facility-Administered Medications Ordered in Epic   Medication Dose  "Route Frequency Provider Last Rate Last Dose     acetaminophen (TYLENOL) tablet 650 mg  650 mg Oral Q4H PRN Schumer, Amy, MD         carboprost (HEMABATE) injection 250 mcg  250 mcg Intramuscular Once PRN Schumer, Amy, MD         dextrose 5% in lactated ringers infusion   Intravenous Continuous PRN Schumer, Amy, MD         glucose gel 15-30 g  15-30 g Oral Q15 Min PRN Schumer, Amy, MD        Or     dextrose 50 % injection 25-50 mL  25-50 mL Intravenous Q15 Min PRN Schumer, Amy, MD        Or     glucagon injection 1 mg  1 mg Subcutaneous Q15 Min PRN Schumer, Amy, MD         fentaNYL (PF) (SUBLIMAZE) injection  mcg   mcg Intravenous Q1H PRN Schumer, Amy, MD         ibuprofen (ADVIL/MOTRIN) tablet 800 mg  800 mg Oral Once PRN Schumer, Amy, MD         insulin 1 units/1 mL saline (NovoLIN, HumuLIN Regular) infusion ADULT/PEDS   Intravenous Continuous PRN Schumer, Amy, MD         lactated ringers BOLUS 1,000 mL  1,000 mL Intravenous Once PRN Schumer, Amy, MD        Or     lactated ringers BOLUS 500 mL  500 mL Intravenous Once PRN Schumer, Amy, MD         lactated ringers BOLUS 500 mL  500 mL Intravenous Once PRN Schumer, Amy, MD         lactated ringers infusion   Intravenous Continuous Schumer, Amy,  mL/hr at 12/29/19 1514       lidocaine 1 % 0.1-20 mL  0.1-20 mL Subcutaneous Once PRN Schumer, Amy, MD         Medication Instructions - cervical ripening and induction medications   Does not apply Continuous PRN Schumer, Amy, MD         Medication Instructions: misoprostol (CYTOTEC)- Nurse to discuss ordering with provider, if needed. Ordered via \"OB misoprostol (CYTOTEC) Postpartum Hemorrhage PANEL\"   Does not apply Continuous PRN Schumer, Amy, MD         methylergonovine (METHERGINE) injection 200 mcg  200 mcg Intramuscular Once PRN Schumer, Amy, MD         misoprostol (cervical ripening) (CYTOTEC) quarter-tab 25 mcg  25 mcg Oral Q2H PRN Schumer, Amy, MD   25 mcg at 12/29/19 1509     misoprostol " (cervical ripening) (CYTOTEC) quarter-tab 25 mcg  25 mcg Vaginal Q4H PRN Evonne Guzman MD         naloxone (NARCAN) injection 0.1-0.4 mg  0.1-0.4 mg Intravenous Q2 Min PRN Schumer, Amy, MD         nicotine (NICODERM CQ) 7 MG/24HR 24 hr patch 1 patch  1 patch Transdermal Daily PRN Schumer, Amy, MD         nicotine Patch in Place   Transdermal Q8H Schumer, Amy, MD   Stopped at 12/29/19 1516     nitrous oxide/oxygen 50/50 blend   Inhalation Continuous PRN Schumer, Amy, MD         ondansetron (ZOFRAN) injection 4 mg  4 mg Intravenous Q6H PRN Schumer, Amy, MD         oxyCODONE-acetaminophen (PERCOCET) 5-325 MG per tablet 1 tablet  1 tablet Oral Once PRN Schumer, Amy, MD         oxytocin (PITOCIN) 30 units in 500 mL 0.9% NaCl infusion  100-340 mL/hr Intravenous Continuous PRN Schumer, Amy, MD         oxytocin (PITOCIN) injection 10 Units  10 Units Intramuscular Once PRN Schumer, Amy, MD         penicillin G potassium injection 2.5 Million Units  2.5 Million Units Intravenous Q4H Schumer, Amy, MD   Stopped at 12/29/19 1346     sertraline (ZOLOFT) tablet 25 mg  25 mg Oral Daily Schumer, Amy, MD         sodium chloride 0.9% infusion   Intravenous Continuous Schumer, Amy, MD   Stopped at 12/29/19 1242     tranexamic acid (CYKLOKAPRON) bolus 1 g vial attach to NaCl 50 or 100 mL bag ADULT  1 g Intravenous Q30 Min PRN Schumer, Amy, MD         No current [facility]-ordered outpatient medications on file.       PHYSICAL EXAM:    Vitals: T 98.2, P Data Unavailable, /65, R 18, SpO2  , Weight   Wt Readings from Last 2 Encounters:   12/29/19 92.5 kg (204 lb)   12/17/19 94 kg (207 lb 3.2 oz)       See doc flowsheet    NPO STATUS: see doc flowsheet    LABS:    BMP:  Recent Labs   Lab Test 08/20/19  1644      POTASSIUM 4.2   CHLORIDE 104   CO2 23   BUN 11   CR 0.41*   GLC 82   SUMANTH 9.4       LFTs:   Recent Labs   Lab Test 12/29/19  1453 08/20/19  1644   PROTTOTAL  --  7.4   ALBUMIN  --  3.3*   BILITOTAL  --  0.2   ALKPHOS   --  86   AST 17 18   ALT 21 19       CBC:   Recent Labs   Lab Test 12/29/19  0900   WBC 14.3*   RBC 3.37*   HGB 10.0*   HCT 30.5*   MCV 91   MCH 29.7   MCHC 32.8   RDW 13.1          Coags:  Recent Labs   Lab Test 12/29/19  1453 08/20/19  1644  11/17/18  0545   INR 0.91 0.93   < > 1.26*   PTT  --  31  --   --    FIBR  --   --   --  185*    < > = values in this interval not displayed.       Imaging:  No orders to display       Jameson Boateng MD  Anesthesiology Staff  Pager [phone]    12/29/2019  4:14 PM

## 2019-12-29 NOTE — H&P
L&D History and Physical   2019  Karla Gordon  [id number]      HPI: Karla Gordon is a 29 year old  at 39w5d by 13w1d US who presented for scheduled labor induction for GDMA1.    She states that she is feeling well today.  She denies headache, vision changes, chest pain, shortness of breath. She denies vaginal bleeding or loss of fluid and is feeling normal fetal movement. She is feeling rare mild contractions     Her pregnancy has been complicated by:  - GDMA1  - GBS positivity  - anemia  - history of EtOH use with hepatic steatosis  - Anxiety/depression  - tobacco use disorder    ROS: See above HPI    OBHX:   OB History    Para Term  AB Living   3 1 1 0 1 1   SAB TAB Ectopic Multiple Live Births   0 1 0 0 1      # Outcome Date GA Lbr Gentry/2nd Weight Sex Delivery Anes PTL Lv   3 Current            2 Term /    M    KATELYN   1 TAB      TAB         Obstetric Comments               Pitocin augmentation.  Pushed for long time.  7 lb 6 oz                                                   PMHx:  History of alcohol use disorder  Hepatic steatosis  GDMA1  Macrocytic anemia  Tobacco use disorder  Anxiety    PSHx:  Past Surgical History:   Procedure Laterality Date     ABDOMINAL PARACENTESIS  2019    US Paracentesis with Imaging at [facility].  Fluid removed: 5910 mL     C ANESTH,SHOULDER REPLACEMENT Right 2015    right partial shoulder replacement at age 25     HC TOOTH EXTRACTION W/FORCEP  2015     ORTHOPEDIC SURGERY         Medications:   PNV  Iron  Zoloft 25 mg      Allergies   Allergen Reactions     Lexapro [Escitalopram] Nausea     Severe nausea; does not wish to take again     Spinach      Mouth gets numb       SocialHX: Reports about 1/2 ppd. No alcohol or drug use.  Social History     Tobacco Use     Smoking status: Current Every Day Smoker     Packs/day: 0.50     Years: 7.00     Pack years: 3.50     Types: Cigarettes     Smokeless tobacco: Never Used  "  Substance Use Topics     Alcohol use: Not Currently     Comment: daily, few drinks a day, not during pregnancy     Drug use: No       ROS: 10-point ROS negative except as indicated in HPI.    Physical Exam:  Vitals:    19 0826 19 0905   BP: 113/71    Resp: 18    Temp: 98.2  F (36.8  C)    TempSrc: Oral    Weight:  92.5 kg (204 lb)   Height:  1.689 m (5' 6.5\")     General: alert, oriented female, resting in bed in NAD  CV: regular rate   Lungs: unlabored breathing  Abdomen: soft, gravid, non-tender, EFW 7#.    SVE: 2/10/-3, medium, posterior    Membranes: intact    Presentation: cephalic by BSUS    FHT: baseline 125 bpm, moderate variability, present accelerations, no decelerations  [place]: uterine irritability, irregular ctx      Prenatal Labs:   Lab Results   Component Value Date    ABO A 2019    RH Pos 2019    AS Neg 2019    HEPBANG Nonreactive 2019    HGB 9.9 (L) 2019       GBS Status:   Lab Results   Component Value Date    GBS Positive (A) 2019       Lab Results   Component Value Date    PAP NIL 2019       Labs:   Results for orders placed or performed during the hospital encounter of 19 (from the past 24 hour(s))   Drug abuse scrn 7 UR (/) (RH, SH, UR)   Result Value Ref Range    Amphetamine Qual Urine Negative NEG^Negative    Cannabinoids Qual Urine Negative NEG^Negative    Cocaine Qual Urine Negative NEG^Negative    Opiates Qualitative Urine Negative NEG^Negative    Pcp Qual Urine Negative NEG^Negative       Assessment: 29 year old  at 39w5d by 13w1d US, here for labor induction with GDMA1.  Doing well.    Plan:    Pregnancy  - Admit to labor and delivery for labor induction. Given low lackey score will start with Mchugh balloon and PV misoprostol. IV pitocin and AROM when able.  - GBS positive; antibiotics PCN once Mhcugh balloon out or if patient is starting to feel regular painful contractions  - Rh positive- rhogam not " indicated; Rubella immune  - Plans breastfeeding; undecided for contraception    Hepatic steatosis  - pt was recommended to follow up with GI but has not  - last ab US (10/11/19) showed no evidence of cirrhosis however possible hepatic steatosis, no focal lesions  - ALT, AST, INR, hepatitis panel now, last LFTs nl  - plan for GI follow up postpartum    Anemia, normocytic  - previously macrocytic and related to folate deficiency  - MCV 91  - T&S in place, IV in place  - possible mixed etiologies now, will add on ferritin now    Fetal well-being  - Category 1 FHT  - EFW 7#    GDMA1  - BG QID during ripening  - BG Q2H in active labor per protocol  - D5LR if needed in active labor with insulin gtt    Tobacco use  - nicotine patch prn    Anxiety/depression  - pta zoloft ord'd  - SW consult postpartum    PNC  -  Posterior placenta. BMI 32       Patient seen and care plan discussed under supervision of Dr. Guzman.    Amy Schumer, MD  Obstetrics and Gyncology, PGY-3  2019 , 10:13 AM      Physician Attestation   I, SHAJI GUZMAN MD, saw this patient with the resident and agree with the resident/fellow's findings and plan of care as documented in the note.      I personally reviewed vital signs.    Key findings: IOL for GDM A1. Mchugh bulb placed and started on oral miso. GBS+ so will need PCN, start sooner rather than later. NST reactive. Anticipate     SHAJI GUZMAN MD  Date of Service (when I saw the patient): 19

## 2019-12-29 NOTE — PROGRESS NOTES
Late entry due to patient care.     [facility]  Labor Progress Note    Subjective:  Patient Karla Gordon    Feeling good overall, hoping induction doesn't take 3 days.     Objective:   Patient Vitals for the past 4 hrs:   BP Temp Temp src Resp   19 1234 123/65 98.2  F (36.8  C) Oral 18     SVE: 2/80/-3, mid, soft, jones balloon placed filled with 60cc     FHT: Baseline 125, mod variability, pos accelerations, no decelerations  [place]: No contractions in 10 minutes    Assessment/Plan:  Ms. Karla Gordno is a 29 year old  at 39w5d by 13w1d US, here for IOL for GDMA1.    - Labor   - Admit to L&D for IOL. Induction with jones balloon, PO miso. S/p 2 doses   - PNC:     - Rh pos. Rubella immune. No intervention indicated.     - Placenta: Posterior   - Fen/GI: Clear liquid diet, IVF in active labor   - SVE: recheck PRN   - Membranes: intact    -Fetal Well Being   - Category 1 FHT. Reactive and reassuring   - Cephalic by BSUS. EFW 7lbs   - Continue EFM and [place]    - GDMA1   - BG QID during ripening   - BG Q2H in active labor   - D5LR if needed in active labor with insulin gtt    - GBS positive   - Start PCN in active labor    - Hepatic steatosis    - Secondary to EtOH use   - Pt recommended to f/up with GI for care but has not   - Last abd US 10/11/19 showed hepatic steatosis, no focal lesions   - ALT, AST, INR, hepatitis panel pending   - F/up GI postpartum    - Anemia, normocytic    - Previously macrocytic, related to folate deficiency   - MCV 91   - T&S, IV In place   - Ferritin pending    - Tobacco use disorder   - Nicotine patch ordered    - APOLINAR/Depression   - PTA zoloft ordered   - PP SW consult      Adriana Balbuena MD PGY1  Obstetrics & Gynecology  19

## 2019-12-29 NOTE — PLAN OF CARE
Pt alert, active, and stable. No signs or symptoms of distress. VSS. Pt sleeping off and on throughout the day. Denies loss of fluid, bleeding, and pain. See flow sheet for FHR interpretation. Plan is for Dr. Guzman to come to pt room at 1500 to place vaginal miso and will start penicillin after. No questions or concerns at this time. Will continue to monitor pt closely.

## 2019-12-30 LAB
GLUCOSE BLDC GLUCOMTR-MCNC: 87 MG/DL (ref 70–99)
HBV SURFACE AG SERPL QL IA: NONREACTIVE
HCV AB SERPL QL IA: NONREACTIVE
HGB BLD-MCNC: 9.3 G/DL (ref 11.7–15.7)

## 2019-12-30 PROCEDURE — 12000001 ZZH R&B MED SURG/OB UMMC

## 2019-12-30 PROCEDURE — 00000146 ZZHCL STATISTIC GLUCOSE BY METER IP

## 2019-12-30 PROCEDURE — 25000132 ZZH RX MED GY IP 250 OP 250 PS 637: Performed by: OBSTETRICS & GYNECOLOGY

## 2019-12-30 PROCEDURE — 36415 COLL VENOUS BLD VENIPUNCTURE: CPT | Performed by: OBSTETRICS & GYNECOLOGY

## 2019-12-30 PROCEDURE — 85018 HEMOGLOBIN: CPT | Performed by: OBSTETRICS & GYNECOLOGY

## 2019-12-30 RX ORDER — IBUPROFEN 600 MG/1
600 TABLET, FILM COATED ORAL EVERY 6 HOURS PRN
Status: DISCONTINUED | OUTPATIENT
Start: 2019-12-30 | End: 2019-12-31 | Stop reason: HOSPADM

## 2019-12-30 RX ORDER — IBUPROFEN 600 MG/1
600 TABLET, FILM COATED ORAL EVERY 6 HOURS PRN
Qty: 60 TABLET | Refills: 0 | Status: SHIPPED | OUTPATIENT
Start: 2019-12-30 | End: 2020-02-11

## 2019-12-30 RX ORDER — AMOXICILLIN 250 MG
1 CAPSULE ORAL 2 TIMES DAILY
Status: DISCONTINUED | OUTPATIENT
Start: 2019-12-30 | End: 2019-12-31 | Stop reason: HOSPADM

## 2019-12-30 RX ORDER — OXYTOCIN/0.9 % SODIUM CHLORIDE 30/500 ML
340 PLASTIC BAG, INJECTION (ML) INTRAVENOUS CONTINUOUS PRN
Status: DISCONTINUED | OUTPATIENT
Start: 2019-12-30 | End: 2019-12-31 | Stop reason: HOSPADM

## 2019-12-30 RX ORDER — AMOXICILLIN 250 MG
1 CAPSULE ORAL DAILY
Qty: 100 TABLET | Refills: 0 | Status: SHIPPED | OUTPATIENT
Start: 2019-12-30 | End: 2020-02-11

## 2019-12-30 RX ORDER — OXYTOCIN 10 [USP'U]/ML
10 INJECTION, SOLUTION INTRAMUSCULAR; INTRAVENOUS
Status: DISCONTINUED | OUTPATIENT
Start: 2019-12-30 | End: 2019-12-31 | Stop reason: HOSPADM

## 2019-12-30 RX ORDER — ACETAMINOPHEN 325 MG/1
650 TABLET ORAL EVERY 4 HOURS PRN
Status: DISCONTINUED | OUTPATIENT
Start: 2019-12-30 | End: 2019-12-31 | Stop reason: HOSPADM

## 2019-12-30 RX ORDER — OXYTOCIN/0.9 % SODIUM CHLORIDE 30/500 ML
100 PLASTIC BAG, INJECTION (ML) INTRAVENOUS CONTINUOUS
Status: DISCONTINUED | OUTPATIENT
Start: 2019-12-30 | End: 2019-12-31 | Stop reason: HOSPADM

## 2019-12-30 RX ORDER — SERTRALINE HYDROCHLORIDE 25 MG/1
37.5 TABLET, FILM COATED ORAL DAILY
Qty: 30 TABLET | Refills: 3 | Status: SHIPPED | OUTPATIENT
Start: 2019-12-30 | End: 2020-12-07

## 2019-12-30 RX ORDER — LANOLIN 100 %
OINTMENT (GRAM) TOPICAL
Status: DISCONTINUED | OUTPATIENT
Start: 2019-12-30 | End: 2019-12-31 | Stop reason: HOSPADM

## 2019-12-30 RX ORDER — BISACODYL 10 MG
10 SUPPOSITORY, RECTAL RECTAL DAILY PRN
Status: DISCONTINUED | OUTPATIENT
Start: 2019-12-31 | End: 2019-12-31 | Stop reason: HOSPADM

## 2019-12-30 RX ORDER — HYDROCORTISONE 2.5 %
CREAM (GRAM) TOPICAL 3 TIMES DAILY PRN
Status: DISCONTINUED | OUTPATIENT
Start: 2019-12-30 | End: 2019-12-31 | Stop reason: HOSPADM

## 2019-12-30 RX ORDER — AMOXICILLIN 250 MG
2 CAPSULE ORAL 2 TIMES DAILY
Status: DISCONTINUED | OUTPATIENT
Start: 2019-12-30 | End: 2019-12-31 | Stop reason: HOSPADM

## 2019-12-30 RX ORDER — ACETAMINOPHEN 325 MG/1
650 TABLET ORAL EVERY 6 HOURS PRN
Qty: 100 TABLET | Refills: 0 | Status: SHIPPED | OUTPATIENT
Start: 2019-12-30

## 2019-12-30 RX ORDER — FERROUS SULFATE 325(65) MG
325 TABLET, DELAYED RELEASE (ENTERIC COATED) ORAL DAILY
Qty: 60 TABLET | Refills: 1 | Status: SHIPPED | OUTPATIENT
Start: 2019-12-30 | End: 2022-05-02

## 2019-12-30 RX ORDER — NALOXONE HYDROCHLORIDE 0.4 MG/ML
.1-.4 INJECTION, SOLUTION INTRAMUSCULAR; INTRAVENOUS; SUBCUTANEOUS
Status: DISCONTINUED | OUTPATIENT
Start: 2019-12-30 | End: 2019-12-31 | Stop reason: HOSPADM

## 2019-12-30 RX ORDER — NICOTINE POLACRILEX 4 MG
15-30 LOZENGE BUCCAL
Status: DISCONTINUED | OUTPATIENT
Start: 2019-12-30 | End: 2019-12-31 | Stop reason: HOSPADM

## 2019-12-30 RX ORDER — DEXTROSE MONOHYDRATE 25 G/50ML
25-50 INJECTION, SOLUTION INTRAVENOUS
Status: DISCONTINUED | OUTPATIENT
Start: 2019-12-30 | End: 2019-12-31 | Stop reason: HOSPADM

## 2019-12-30 RX ADMIN — SENNOSIDES AND DOCUSATE SODIUM 1 TABLET: 8.6; 5 TABLET ORAL at 10:05

## 2019-12-30 RX ADMIN — IBUPROFEN 600 MG: 600 TABLET ORAL at 10:05

## 2019-12-30 RX ADMIN — ACETAMINOPHEN 650 MG: 325 TABLET, FILM COATED ORAL at 20:14

## 2019-12-30 RX ADMIN — IBUPROFEN 600 MG: 600 TABLET ORAL at 04:19

## 2019-12-30 RX ADMIN — ACETAMINOPHEN 650 MG: 325 TABLET, FILM COATED ORAL at 02:01

## 2019-12-30 RX ADMIN — ACETAMINOPHEN 650 MG: 325 TABLET, FILM COATED ORAL at 06:07

## 2019-12-30 RX ADMIN — NICOTINE 1 PATCH: 7 PATCH TRANSDERMAL at 10:59

## 2019-12-30 RX ADMIN — Medication 37.5 MG: at 22:49

## 2019-12-30 RX ADMIN — IBUPROFEN 600 MG: 600 TABLET ORAL at 16:10

## 2019-12-30 RX ADMIN — ACETAMINOPHEN 650 MG: 325 TABLET, FILM COATED ORAL at 16:10

## 2019-12-30 RX ADMIN — ACETAMINOPHEN 650 MG: 325 TABLET, FILM COATED ORAL at 10:05

## 2019-12-30 RX ADMIN — SENNOSIDES AND DOCUSATE SODIUM 2 TABLET: 8.6; 5 TABLET ORAL at 20:10

## 2019-12-30 RX ADMIN — IBUPROFEN 600 MG: 600 TABLET ORAL at 22:49

## 2019-12-30 NOTE — PLAN OF CARE
Data: Karla Gordon transferred to [facility] via wheelchair at 2355. Baby transferred via parent's arms.  Action: Receiving unit notified of transfer: Yes. Patient and family notified of room change. Report given to DAMI Claudio at 3702. Belongings sent to receiving unit. Accompanied by Registered Nurse. Oriented patient to surroundings. Call light within reach. ID bands double-checked with receiving RN.  Response: Patient tolerated transfer and is stable.

## 2019-12-30 NOTE — DISCHARGE SUMMARY
[facility] Discharge Summary    Karla Gordon MRN# [id number]   Age: 29 year old YOB: 1990     Date of Admission:  2019  Date of Discharge::  2019  Admitting Physician:  Evonne Guzman MD  Discharge Physician:  Theresa Mcdaniel MD          Admission Diagnoses:   -IUP at 39w5d  - GDMA1  - GBS positivity  - anemia  - history of EtOH use with hepatic steatosis  - Anxiety/depression  - tobacco use disorder  - mitral regurgitation  - pulmonary nodule          Discharge Diagnosis:   - , now delivered          Procedures:   Procedure(s): -  - Epidural analgesia          Medications Prior to Admission:     Medications Prior to Admission   Medication Sig Dispense Refill Last Dose     BIOTIN PO Take 1 tablet by mouth daily   2019 at Unknown time     FOLIC ACID PO Take 1 mg by mouth daily   2019 at Unknown time     Multiple Vitamins-Minerals (CENTRUM ULTRA WOMENS PO) Take 1 tablet by mouth daily   2019 at Unknown time     Prenatal Vit-Fe Fumarate-FA (PRENATAL VITAMIN PO)    2019 at Unknown time     acetone urine (KETOSTIX) test strip Use 1 strip/day with first morning urine until ketones are negative for 7 days in a row, then use 1 strip/week 50 each 1 Taking     blood glucose (CONTOUR NEXT TEST) test strip Use to test blood sugar 4 times daily or as directed. 200 each 3 Taking     blood glucose monitoring (ROBB MICROLET) lancets Use to test blood sugar 4 times daily or as directed. 100 each 3 Taking     [] Blood Glucose Monitoring Suppl (CONTOUR NEXT ONE) KIT 1 each once for 1 dose SN: [id number], Lot: [id number], Ex: 2020 1 kit 0 Taking     diphenhydrAMINE-acetaminophen (TYLENOL PM)  MG tablet Take 1 tablet by mouth nightly as needed for sleep   Not Taking     [DISCONTINUED] IRON PO    2019 at Unknown time     [DISCONTINUED] sertraline (ZOLOFT) 25 MG tablet   1 2019 at Unknown time             Discharge  Medications:        Review of your medicines      UNREVIEWED medicines. Ask your doctor about these medicines      Dose / Directions   Contour Next One Kit  Used for:  GDM (gestational diabetes mellitus)  Ask about: Should I take this medication?      Dose:  1 each  1 each once for 1 dose SN: [id number], Lot: [id number], Ex: 2020  Quantity:  1 kit  Refills:  0        START taking      Dose / Directions   acetaminophen 325 MG tablet  Commonly known as:  TYLENOL  Used for:   (normal spontaneous vaginal delivery)      Dose:  650 mg  Take 2 tablets (650 mg) by mouth every 6 hours as needed for mild pain Start after Delivery.  Quantity:  100 tablet  Refills:  0     ferrous sulfate 325 (65 Fe) MG EC tablet  Commonly known as:  FE TABS  Used for:  Macrocytic anemia      Dose:  325 mg  Take 1 tablet (325 mg) by mouth daily  Quantity:  60 tablet  Refills:  1     ibuprofen 600 MG tablet  Commonly known as:  ADVIL/MOTRIN  Used for:   (normal spontaneous vaginal delivery)      Dose:  600 mg  Take 1 tablet (600 mg) by mouth every 6 hours as needed for moderate pain Start after delivery  Quantity:  60 tablet  Refills:  0     senna-docusate 8.6-50 MG tablet  Commonly known as:  SENOKOT-S/PERICOLACE  Used for:   (normal spontaneous vaginal delivery)      Dose:  1 tablet  Take 1 tablet by mouth daily Start after delivery.  Quantity:  100 tablet  Refills:  0        CONTINUE these medicines which may have CHANGED, or have new prescriptions. If we are uncertain of the size of tablets/capsules you have at home, strength may be listed as something that might have changed.      Dose / Directions   sertraline 25 MG tablet  Commonly known as:  ZOLOFT  This may have changed:      how much to take    how to take this    when to take this  Used for:  Depression, unspecified depression type      Dose:  37.5 mg  Take 1.5 tablets (37.5 mg) by mouth daily  Quantity:  30 tablet  Refills:  3        CONTINUE these medicines which  have NOT CHANGED      Dose / Directions   acetone urine test strip  Commonly known as:  KETOSTIX  Used for:  GDM (gestational diabetes mellitus)      Use 1 strip/day with first morning urine until ketones are negative for 7 days in a row, then use 1 strip/week  Quantity:  50 each  Refills:  1     BIOTIN PO      Dose:  1 tablet  Take 1 tablet by mouth daily  Refills:  0     blood glucose monitoring lancets  Used for:  GDM (gestational diabetes mellitus)      Use to test blood sugar 4 times daily or as directed.  Quantity:  100 each  Refills:  3     blood glucose test strip  Commonly known as:  Contour Next Test  Used for:  GDM (gestational diabetes mellitus)      Use to test blood sugar 4 times daily or as directed.  Quantity:  200 each  Refills:  3     CENTRUM ULTRA WOMENS PO      Dose:  1 tablet  Take 1 tablet by mouth daily  Refills:  0     diphenhydrAMINE-acetaminophen  MG tablet  Commonly known as:  TYLENOL PM      Dose:  1 tablet  Take 1 tablet by mouth nightly as needed for sleep  Refills:  0     FOLIC ACID PO      Dose:  1 mg  Take 1 mg by mouth daily  Refills:  0     PRENATAL VITAMIN PO      Refills:  0        STOP taking    IRON PO              Where to get your medicines      These medications were sent to [facility] [address]  [address] [address]    Phone:  [phone]     acetaminophen 325 MG tablet    ferrous sulfate 325 (65 Fe) MG EC tablet    ibuprofen 600 MG tablet    senna-docusate 8.6-50 MG tablet    sertraline 25 MG tablet            Consultations:   Anesthesia          Brief Admission History   Karla Gordon is a 29 year old  at 39w5d by 13w1d US who presented for scheduled labor induction for GDMA1.     She states that she is feeling well today.  She denies headache, vision changes, chest pain, shortness of breath. She denies vaginal bleeding or loss of fluid and is feeling normal fetal movement. She is  feeling rare mild contractions         Brief Intrapartum Course:   39w5d arrived for IOL due to GDM a1 and h/o ETOH abuse with fatty liver. Cervix was favorable enough for jones to be placed and she received 2 doses of oral miso and one vaginal miso which put her into labor. Was adequately treated for GBS + with PCN. Epidural was placed and then AROM with clear fluid.  All blood sugars were normal in labor range during labor.         Pushed to deliver viable male infant on December 29, 2019 at 2132 with spontaneous cry.   Anterior shoulder delivered with ease followed by posterior shoulder.   Put on mother's chest where delayed cord clamping was done.     Placenta spontaneous with controlled traction on the cord, intact, 3 vessels and Pitocin IV given with good tone.        Small laceration on posterior vagina bleeding so reapproximated with 4-0 Vicryl and midline lac between urethra and clitoris hemostatic but deep so figure of X 4-0 used to reapproximate skin edges.  Mother and infant in stable condition.  No complications.          Hospital Course:   The patient's hospital course was unremarkable.  On discharge, her pain was well controlled. Vaginal bleeding is similar to peak menstrual flow.  Voiding without difficulty.  Ambulating well and tolerating a normal diet.  No fever.  Breastfeeding well.  Infant is stable.  + bowel movement yet.  She was discharged on post-partum day #2.    Post-partum hemoglobin: 9.3. discharged with PO iron for acute on chronic blood loss iron deficiency anemia, expected, asymptomatic.          Discharge Instructions and Follow-Up:   Discharge diet: Regular   Discharge activity: Pelvic rest for 6 weeks including no sexual intercourse, tampons, or douching.    Discharge follow-up: Follow up with your primary OB for a routine postpartum visit in 6 weeks, GI follow up ordered. Chest CT, ECHO ordered           Discharge Disposition:   Discharged to home in stable condition              Physician Attestation   I, Theresa Mcdaniel, saw and evaluated this patient prior to discharge.  I discussed the patient with the resident/fellow and agree with plan of care as documented in the note.      I personally reviewed vital signs, medications, labs, imaging and exam.    I personally spent 20 minutes on discharge activities.    Theresa Mcdaniel MD  Date of Service (when I saw the patient): 12/31/19

## 2019-12-30 NOTE — PROGRESS NOTES
Clinical Nutrition Services Progress Note - Brief    Patient is a 29 year old female with an admission nutrition risk screen (+) for newly diagnosed/uncontrolled DM.    Patient with GDM. Patient with controlled GDM during pregnancy with diet activity.     Patient not appropriate for RD visit at this time as patient here for delivery. Patient's GDM may resolve post-partum.     Please page or consult RD if patient becomes appropriate for DM diet education during stay prior to discharge.    Follow Up/ Monitoring  No nutrition follow-up warranted at this time. RD to sign off. Please consult if further needs arise.     Maggie Greco RD, LD  Unit pager: [phone]

## 2019-12-30 NOTE — PLAN OF CARE
VSS at this time. Post- partum assessment WDL. Pt. Is up and moving around room independently. Pt. Has voided multiple times since epidural out. Taking ibuprofen and tylenol for pain. Was educated on how to fill out edinburgh and birth certificate. Pt and  bonding well with infant. Pt. Was educated on safe sleep with baby. Will continue to monitor at this time.

## 2019-12-30 NOTE — PROGRESS NOTES
"S; Feeling good, a little sore.  Lochia slowing.  Urinating Ok.  Breastfeeding.    O: /84 (BP Location: Right arm)   Pulse 81   Temp 98  F (36.7  C) (Oral)   Resp 18   Ht 1.689 m (5' 6.5\")   Wt 92.5 kg (204 lb)   LMP 2019   SpO2 98%   Breastfeeding Unknown   BMI 32.43 kg/m       Abd: SNT, fundus firm  Ex: no calf tenderness    Hgb: 9.3  FBS: 87    A/P:  1) PPD#1 S/P  after IOL for GDMA1   Doing well.  Continue routine cares.  She desires discharge as early as possible, but since delivery was almost 10pm, plans to stay overnight and will D/C in am.    SHANEKA CESPEDES MD    "

## 2019-12-30 NOTE — PLAN OF CARE
at .  Baby Girl named IVY, apgars 9/9 at 1 and 5 minutes respectively.  Perineal and periclitoral lacerations repaired. Minimal swelling, ice pack on.  Bleeding and fundus appropriate. Continue with postpartum care.

## 2019-12-30 NOTE — PROGRESS NOTES
[facility]   Postpartum Note    Name:  Karla Gordon  MRN: [id number]    S: Patient doing well.  Pain well controlled.  Tolerating regular diet without nausea or vomiting.  Ambulating without dizziness.  Lochia appropriate.  Voiding spontaneously. Plans to breastfeed.  Denies fevers, chills, headaches, vision changes, chest pain, shortness of breath, increased edema.    O:   Patient Vitals for the past 24 hrs:   BP Temp Temp src Pulse Resp SpO2 Height Weight   12/30/19 0415 115/84 98  F (36.7  C) Oral 81 18 98 % -- --   12/30/19 0010 106/65 98.2  F (36.8  C) Oral 84 16 97 % -- --   12/29/19 2315 110/59 -- -- -- 16 98 % -- --   12/29/19 2306 110/61 -- -- -- 16 -- -- --   12/29/19 2305 -- -- -- -- -- 97 % -- --   12/29/19 2251 113/65 -- -- -- 16 -- -- --   12/29/19 2250 -- -- -- -- -- 98 % -- --   12/29/19 2234 115/61 98.5  F (36.9  C) Oral -- -- 97 % -- --   12/29/19 2215 115/57 -- -- -- 16 97 % -- --   12/29/19 2200 107/62 -- -- -- 16 98 % -- --   12/29/19 2152 105/65 -- -- -- 16 -- -- --   12/29/19 2137 109/74 -- -- -- 16 -- -- --   12/29/19 2115 109/58 -- -- -- 16 97 % -- --   12/29/19 1900 -- 98.3  F (36.8  C) Oral -- -- -- -- --   12/29/19 1836 116/62 -- -- -- 16 -- -- --   12/29/19 1827 116/62 -- -- -- 16 -- -- --   12/29/19 1825 -- -- -- -- -- 96 % -- --   12/29/19 1820 -- -- -- -- -- 96 % -- --   12/29/19 1817 112/63 -- -- -- 16 -- -- --   12/29/19 1810 -- -- -- -- -- 96 % -- --   12/29/19 1808 109/58 -- -- -- 16 -- -- --   12/29/19 1800 -- -- -- -- 16 97 % -- --   12/29/19 1757 109/61 -- -- -- -- -- -- --   12/29/19 1750 -- -- -- -- -- 96 % -- --   12/29/19 1746 113/64 -- -- -- 16 -- -- --   12/29/19 1745 -- -- -- -- -- 97 % -- --   12/29/19 1742 116/67 -- -- -- 16 -- -- --   12/29/19 1736 109/59 -- -- -- 20 -- -- --   12/29/19 1735 -- -- -- -- -- 96 % -- --   12/29/19 1730 111/61 -- -- -- 16 96 % -- --   12/29/19 1728 109/62 -- -- -- 16 -- -- --   12/29/19 1727 111/60  "-- -- -- 20 -- -- --   19 110/66 -- -- -- 20 -- -- --   19 -- -- -- -- -- 97 % -- --   19 110/59 -- -- -- 16 -- -- --   190 120/64 -- -- -- 16 98 % -- --   19 120/64 -- -- -- 16 -- -- --   19 119/66 -- -- -- 20 -- -- --   19 -- -- -- -- -- 98 % -- --   19 128/67 -- -- -- 16 -- -- --   19 -- -- -- -- -- 97 % -- --   19 1234 123/65 98.2  F (36.8  C) Oral -- 18 -- -- --   19 09 -- -- -- -- -- -- 1.689 m (5' 6.5\") 92.5 kg (204 lb)     Gen:  Resting comfortably, NAD  CV:  Regular rate  Pulm:  Non-labored breathing on room air  Abd:  Soft, appropriately ttp, non-distended. Fundus below umbilicus, firm and non-tender.  Ext:  non-tender, trace LE edema b/l    I/O last 3 completed shifts:  In: -   Out: 928 [Urine:350; Blood:578]    Hgb:   Hemoglobin   Date Value Ref Range Status   2019 9.3 (L) 11.7 - 15.7 g/dL Final       Assessment/Plan:  29 year old  on PPD #1 s/p .  Pregnancy complicated by GDMA1, GBS positivity, anemia, history of EtOH use with hepatitic steatosis, pulmonary lung nodule, anxiety, depression, and tobacco use disorder.    - Routine postpartum management  Pain: Well-controlled with ibuprofen and tylenol  Hgb: 10.0> ml> 9.3. Will discharge home with iron supplements.   GI:  PRN bowel regimen.   :  Voiding spontaneously, no issues.   Rh: Positive, no rhogam  Rubella: immune  Feed: Plans to breast feed    Hepatic steatosis  - last ab US (10/11/19) showed no evidence of cirrhosis however possible hepatic steatosis, no focal lesions  - ALT, AST, INR nl, hepatitis panel in process  - plan for GI follow up postpartum     Anemia, normocytic  - previously macrocytic and related to folate deficiency  - MCV 91  - ferritin nl     GDMA1  - did not require insulin gtt  - FBG 87    Tobacco use  - nicotine patch prn     Anxiety/depression  - pta zoloft ord'd  - SW consult " postpartum    Dispo: Anticipate dc home PPD#1-2    Holger Shaw MD  Ob/Gyn Resident, PGY-1  12/30/19 9:04 AM

## 2019-12-30 NOTE — LACTATION NOTE
This note was copied from a baby's chart.  Consult for GDM, 2nd baby breastfeeding well.     Mom smokes cigarettes, history of tinnitus, fatty liver, lymphadenopathy, alcoholic hepatitis with ascites & paracentesis 2019.  Karla denies breast changes during either of her pregnancies. She  with her first child but reports she never made enough, began supplementing with formula after the first week or so, switched to all formula at 6-8 weeks. She would like to breastfeed but concerned about supply, knows that she will need to use formula again. She feels latch is going well but she is now getting sore.     Discussed supply and demand and impact of early days for maximizing supply. Offered options of hand expressing frequently and pumping whenever she's up to it after feedings to stimulate supply. Reviewed anatomy of breast and infant mouth for feedings, importance of good latch for comfort, milk transfer and resulting supply. Jackelyn was sleepy, mom attempted latch during visit but infant disinterested. Encouraged frequent skin to skin when awake, hand expressing at least every 2-3 hours and pumping as able during the first 5 days at least for extra stimulation. Reviewed normal  weight loss, signs getting enough and breastfeeding low with who and when to call if not meeting goals, breastfeeding resource list with LC options near their [facility].

## 2019-12-30 NOTE — PLAN OF CARE
Patient arrived to [facility] unit via wheelchair at 0000,with belongings, accompanied by spouse/ significant other, with infant in arms. Received report from Shelton REY RN  and checked bands. Unit and room orientation completd. Call light given; no concerns present at this time. Continue with plan of care.

## 2019-12-30 NOTE — PROGRESS NOTES
Subjective:   Contractions: very comfortable  Leakage of fluids: no        Objective:  Patient Vitals for the past 8 hrs:   BP Temp Temp src Resp SpO2   19 1800 -- -- -- 16 97 %   19 175 109/61 -- -- -- --   19 1750 -- -- -- -- 96 %   19 1746 113/64 -- -- 16 --   19 1745 -- -- -- -- 97 %   19 174 116/67 -- -- 16 --   19 1736 109/59 -- -- 20 --   19 1735 -- -- -- -- 96 %   19 1730 111/61 -- -- 16 96 %   19 1728 109/62 -- -- 16 --   19 1727 111/60 -- -- 20 --   19 1726 110/66 -- -- 20 --   19 1725 -- -- -- -- 97 %   19 1722 110/59 -- -- 16 --   19 1720 120/64 -- -- 16 98 %   19 1718 120/64 -- -- 16 --   19 1716 119/66 -- -- 20 --   19 1715 -- -- -- -- 98 %   19 1714 128/67 -- -- 16 --   19 171 -- -- -- -- 97 %   19 1234 123/65 98.2  F (36.8  C) Oral 18 --     Cervix: 7/70/-2  Membranes: AROM, clear    EFM:   120 baseline, moderate variablility, + accels, no decels, Category I  Tocometer: external monitor and frequency q 2 minutes    Assessment:/Plan:   Labor: induced with cytotec for 3 doses  Now laboring on her own. Has received one dose PCN and next one is in 15 min. Will recheck cervix in few hours. Discussed pitocin if needed. Anticipate .    SHAJI PARKS MD

## 2019-12-30 NOTE — PLAN OF CARE
Vital signs and assessments WDL. Pain managed with ibuprofen and tylenol. Up independently in room. Positive bonding observed. Independent with breastfeeding.

## 2019-12-30 NOTE — L&D DELIVERY NOTE
Delivery Summary    Karla Gordon MRN# [id number]   Age: 29 year old YOB: 1990     ASSESSMENT & PLAN: Vaginal Delivery Summary    39w5d arrived for IOL due to GDM a1 and h/o ETOH abuse with fatty liver. Cervix was favorable enough for jones to be placed and she received 2 doses of oral miso and one vaginal miso which put her into labor. Was adequately treated for GBS + with PCN. Epidural was placed and then AROM with clear fluid.  All blood sugars were normal in labor range during labor.         Pushed to deliver viable male infant on 2019 at 2132 with spontaneous cry.   Anterior shoulder delivered with ease followed by posterior shoulder.   Put on mother's chest where delayed cord clamping was done.    Placenta spontaneous with controlled traction on the cord, intact, 3 vessels and Pitocin IV given with good tone.       Small laceration on posterior vagina bleeding so reapproximated with 4-0 Vicryl and midline lac between urethra and clitoris hemostatic but deep so figure of X 4-0 used to reapproximate skin edges.  Mother and infant in stable condition.  No complications.    SHAJI PARKS MD           Labor Event Times    Labor onset date:  19 Onset time:   6:45 PM   Dilation complete date:  19 Complete time:   9:22 PM   Start pushing date/time:  2019      Labor Length    1st Stage (hrs):  2 (min):  37   2nd Stage (hrs):  0 (min):  10   3rd Stage (hrs):  0 (min):  2      Labor Events     labor?:  No   steroids:  None  Labor Type:  Induction  Predominate monitoring during 1st stage:  continuous electronic fetal monitoring     Antibiotics received during labor?:  Yes  Reason for Antibiotics:  GBS  Antibiotics received for GBS:  Penicillin  Antibiotics Given (GBS):  Greater than 4 hours prior to delivery     Rupture date/time: 19 1845   Rupture type:  Artificial Rupture of Membranes  Fluid color:  Clear     Induction:   Mechanical ripening agent, Misoprostol  Induction date/time:     Cervical ripening date/time:        Additional Management:  GDM, liver diseasse  1:1 continuous labor support provided by?:  RN Labor partogram used?:  no      Delivery/Placenta Date and Time    Delivery Date:  12/29/19 Delivery Time:   9:32 PM   Placenta Date/Time:  12/29/2019  9:34 PM  Oxytocin given at the time of delivery:  after delivery of baby     Vaginal Counts     Initial count performed by 2 team members:   Two Team Members   Dami Jimenez MD       Needles Suture [place] Spong[employer] Instruments   Initial counts 2 0 5    Added to count 0 1 0    Final counts 2 1 5    Placed during labor Accounted for at the end of labor   No NA   No NA   No NA    Final count performed by 2 team members:   Two Team Members   DAMI Jimenez MD      Final count correct?:  Yes         Apgars    Living status:  Living   1 Minute 5 Minute 10 Minute 15 Minute 20 Minute   Skin color: 1  1       Heart rate: 2  2       Reflex irritability: 2  2       Muscle tone: 2  2       Respiratory effort: 2  2       Total: 9  9       Apgars assigned by:  HERBERT HARRINGTON RN     Cord    Vessels:  3 Vessels Complications:  None   Cord Blood Disposition:  Lab Gases Sent?:  No      Skin to Skin and Feeding Plan    Skin to skin initiation date/time: 1/12/1841    Skin to skin with:  Mother  Skin to skin end date/time:     How do you plan to feed your baby:  Breastfeeding     Labor Events and Shoulder Dystocia    Shoulder dystocia present?:  Neg             Delivery (Maternal) (Provider to Complete) (101795)    Episiotomy:  None  Perineal lacerations:  1st Repaired?:  Yes      Blood Loss  Mother: Karla Gordon #[id number]   Start of Mother's Information    IO Blood Loss  12/29/19 1845 - 12/29/19 2220    Delivery QBL (mL) Hospital Encounter 400 mL    Total  400 mL         End of Mother's Information  Mother: Tammy Gordonhlchristiano Hart #[id number]         Delivery  - Provider to Complete ([id number])    Delivering clinician:  Evonne Guzman MD  Attempted Delivery Types (Choose all that apply):  Spontaneous Vaginal Delivery  Delivery Type (Choose the 1 that will go to the Birth History):  Vaginal, Spontaneous   Other personnel:   Provider Role   Dina Cantu, RN Registered Nurse         Placenta    Delayed Cord Clamping:  Done  Date/Time:  12/29/2019  9:34 PM  Removal:  Spontaneous  Disposition:  Hospital disposal     Anesthesia    Method:  Epidural  Cervical dilation at placement:  4-7          Presentation and Position    Presentation:  Vertex   Occiput Anterior           EVONNE GUZMAN MD

## 2019-12-30 NOTE — ANESTHESIA PROCEDURE NOTES
Epidural Procedure Note    Staff:     Anesthesiologist:  Jameson Boateng MD  Location: OB     Procedure start time:  12/29/2019 5:00 PM     Procedure end time:  12/29/2019 5:30 PM   Pre-procedure checklist:   patient identified, IV checked, site marked, risks and benefits discussed, informed consent, monitors and equipment checked, pre-op evaluation, at physician/surgeon's request and post-op pain management      Correct Patient: Yes      Correct Position: Yes      Correct Site: Yes      Correct Procedure: Yes      Correct Laterality:  Yes    Site Marked:  Yes  Procedure:     Procedure:  Epidural catheter    ASA:  2 and Emergent    Diagnosis:  Labor pain    Position:  Sitting    Sterile Prep: chloraprep, mask, sterile gloves and patient draped      Insertion site:  L3-4    Local skin infiltration:  1% lidocaine    amount (mL):  3    Approach:  Midline    Needle gauge (G):  17    Needle Length (in):  3.5    Block Needle Type:  Touhy    Injection Technique:  LORT saline    KENDY at (cm):  6    Attempts:  1    Redirects:  1    Catheter gauge (G):  19    Catheter threaded easily: Yes      Threaded to cm at skin:  11    Paresthesias:  No    Aspiration negative for Heme or CSF: Yes      Test dose (mL):  3     Local anesthetic:  Lidocaine 1.5% w/ 1:200,000 epinephrine    Test dose time:  17:20    Test dose negative for signs of intravascular, subdural or intrathecal injection: Yes

## 2019-12-30 NOTE — CONSULTS
Diabetes Education  Received consult request to see this 29 year old female.  She was diagnosed with gestational diabetes during her pregnancy.  She delivered on 12/29/19.  Her fasting glucose this morning was 87 mg/dL.    Patient received diabetes education on 10/30/19, and 11/8/19.  Her glucoses were controlled with diet and activity.    No educational needs identified at this point.    She should have a 2 hour glucose tolerance test at her 6 week postpartum visit.  Will sign off.  Viola Pathak MS, APRN, CNS, CDE, CDTC  [phone]

## 2019-12-31 VITALS
SYSTOLIC BLOOD PRESSURE: 116 MMHG | BODY MASS INDEX: 32.02 KG/M2 | HEIGHT: 67 IN | HEART RATE: 78 BPM | TEMPERATURE: 98.1 F | WEIGHT: 204 LBS | DIASTOLIC BLOOD PRESSURE: 75 MMHG | RESPIRATION RATE: 18 BRPM | OXYGEN SATURATION: 98 %

## 2019-12-31 NOTE — PLAN OF CARE
Pt's vitals & PP assessments are within normal limits. Denies pain at present. Breast & formula feeding with minimal . Positive bonding with infant observed.

## 2019-12-31 NOTE — PLAN OF CARE
Data: Vital signs within normal limits. Postpartum checks within normal limits - see flow record. Patient eating and drinking normally. Patient able to empty bladder independently and is up ambulating. No apparent signs of infection. Perineum  healing well. Patient performing self cares and is able to care for infant.  Action: Patient medicated during the shift for cramping. See MAR. Patient reassessed within 1 hour after each medication and pain was improved - patient stated she was comfortable. Patient education done about pain control, discharge, breast pump, latch and positioning. See flow record.  Response: Positive attachment behaviors observed with infant. Support persons are present.   Plan: Anticipate discharge on Tuesday.

## 2019-12-31 NOTE — PROGRESS NOTES
Post Partum Progress Note  PPD#2    Subjective:  She is resting comfortably in bed this morning. She has no complaints. Pain is improving and well controlled on current medication regimen. She is tolerating PO intake. Lochia present and similar to menses.  She is voiding without difficulty. She has passed flatus and has had a BM. She is ambulating without dizziness or difficulty.  She denies headache, changes in vision, nausea/vomiting, chest pain, shortness of breath, RUQ pain, or worsening edema.  Plans to breast feed.    Objective:  Vitals:    19 1000 19 1600 19 2240 19 0101   BP: 120/77 119/79 114/71 112/84   BP Location:       Pulse: 81 84     Resp: 16 16 18 18   Temp: 98.2  F (36.8  C) 98.1  F (36.7  C) 98  F (36.7  C) 98.2  F (36.8  C)   TempSrc: Oral Oral Oral Oral   SpO2:       Weight:       Height:           General: NAD, resting comfortably  CV: Regular rate, well perfused.   Pulm: Normal respiratory effort.  Abd: Soft, non-tender, non-distended. Fundus is firm and below the umbilicus.    Ext: trace lower extremity edema bilaterally. No calf tenderness.    Assessment/Plan:  Karla Gordon is a 29 year old  female who is PPD#2 s/p . Pregnancy complicated by GDMA1, GBS positivity, anemia, history of EtOH use with hepatitic steatosis, pulmonary lung nodule, anxiety, depression, and tobacco use disorder.    - Encourage routine post-operative goals including ambulation and incentive spirometry  - PNC: Rh positive. Rubella immune. No intervention indicated.  - Pain: controlled on oral medications  - Heme: Hgb 10.0>>9.3g.  Will discharge home with iron.  - GI: stool softeners as needed. +flatus and BM  - : Voiding spontaneously.  - Infant: Stable   - Feeding: Plans on breastfeeding.  - BC: Will discuss with primary OB    Discharge to home later today.    Savannah Santa MD  Obstetrics and Gyncology, PGY-1  2019 , 07:47      Physician Attestation    I, Theresa Mcdaniel MD, personally examined and evaluated this patient.  I discussed the patient with the resident/fellow and care team, and agree with the assessment and plan of care as documented in the note of 19.      I personally reviewed vital signs, medications, labs and exam.    Key findings: 29 year old  on PPD 2 s/p uncomplicated . Doing well postpartum, meeting discharge criteria. Discharge to home today. Plan mood check in 2 weeks, postpartum visit in 6 weeks. Discussed follow up with GI for liver issues, echo for history of mitral regurg, 2h gtt for gestational DM, repeat chest CT to follow lung nodule. History of depression and alcohol abuse. Feels mood is stable. Reviewed discharge instructions including activity restrictions, pelvic rest and follow up plan. Reviewed signs of excessive bleeding, infection, postpartum pre-eclampsia, mastitis, DVT and postpartum depression - instructed patient to call if concerned about any of these or other concerns. Patient to follow up in 6 weeks for routine postpartum visit, undecided for contraception. All questions answered.    Theresa Mcdaniel MD  Date of Service (when I saw the patient): 19

## 2019-12-31 NOTE — PLAN OF CARE
Data: Vital signs within normal limits. Postpartum checks within normal limits - see flow record. Patient eating and drinking normally. Patient able to empty bladder independently and is up ambulating. No apparent signs of infection. perineum healing well. Patient performing self cares and is able to care for infant.  Action: Patient declines pain medication, states adequate pain control. Patient education done about discharge medications, discharge instructions, and discharge follow up plan. Patient states she understands and has no further questions at this time. See flow record.  Response: Positive attachment behaviors observed with infant. Support persons not present.   Plan: Anticipate discharge this morning.

## 2020-02-11 ENCOUNTER — PRENATAL OFFICE VISIT (OUTPATIENT)
Dept: OBGYN | Facility: CLINIC | Age: 30
End: 2020-02-11
Payer: COMMERCIAL

## 2020-02-11 VITALS
WEIGHT: 204.8 LBS | TEMPERATURE: 98.7 F | HEIGHT: 67 IN | HEART RATE: 95 BPM | BODY MASS INDEX: 32.15 KG/M2 | SYSTOLIC BLOOD PRESSURE: 117 MMHG | DIASTOLIC BLOOD PRESSURE: 78 MMHG | OXYGEN SATURATION: 97 %

## 2020-02-11 DIAGNOSIS — R59.1 LYMPHADENOPATHY: ICD-10-CM

## 2020-02-11 DIAGNOSIS — K70.9 LIVER DISEASE, CHRONIC, DUE TO ALCOHOL (H): ICD-10-CM

## 2020-02-11 PROCEDURE — 99207 ZZC POST PARTUM EXAM: CPT | Performed by: OBSTETRICS & GYNECOLOGY

## 2020-02-11 ASSESSMENT — ANXIETY QUESTIONNAIRES
6. BECOMING EASILY ANNOYED OR IRRITABLE: NOT AT ALL
7. FEELING AFRAID AS IF SOMETHING AWFUL MIGHT HAPPEN: NOT AT ALL
GAD7 TOTAL SCORE: 4
3. WORRYING TOO MUCH ABOUT DIFFERENT THINGS: SEVERAL DAYS
1. FEELING NERVOUS, ANXIOUS, OR ON EDGE: SEVERAL DAYS
5. BEING SO RESTLESS THAT IT IS HARD TO SIT STILL: NOT AT ALL
IF YOU CHECKED OFF ANY PROBLEMS ON THIS QUESTIONNAIRE, HOW DIFFICULT HAVE THESE PROBLEMS MADE IT FOR YOU TO DO YOUR WORK, TAKE CARE OF THINGS AT HOME, OR GET ALONG WITH OTHER PEOPLE: SOMEWHAT DIFFICULT
2. NOT BEING ABLE TO STOP OR CONTROL WORRYING: SEVERAL DAYS

## 2020-02-11 ASSESSMENT — PATIENT HEALTH QUESTIONNAIRE - PHQ9
5. POOR APPETITE OR OVEREATING: SEVERAL DAYS
SUM OF ALL RESPONSES TO PHQ QUESTIONS 1-9: 5

## 2020-02-11 ASSESSMENT — MIFFLIN-ST. JEOR: SCORE: 1668.66

## 2020-02-11 NOTE — PROGRESS NOTES
"Chief Complaint   Patient presents with     Post Partum Exam       Initial /78 (BP Location: Left arm, Patient Position: Sitting, Cuff Size: Adult Large)   Pulse 95   Temp 98.7  F (37.1  C) (Oral)   Ht 1.689 m (5' 6.5\")   Wt 92.9 kg (204 lb 12.8 oz)   SpO2 97%   Breastfeeding No   BMI 32.56 kg/m   Estimated body mass index is 32.56 kg/m  as calculated from the following:    Height as of this encounter: 1.689 m (5' 6.5\").    Weight as of this encounter: 92.9 kg (204 lb 12.8 oz).  BP completed using cuff size: large    Questioned patient about current smoking habits.  Pt. currently smokes.  Advised about smoking cessation.          The following HM Due: NONE      The following patient reported/Care Every where data was sent to:  P ABSTRACT QUALITY INITIATIVES [14032]  n/a      n/a and patient has appointment for today        SUBJECTIVE:  Karla Gordon is a 29 year old female  here for a postpartum visit.  She had a  on 19  delivering a healthy baby girl weighing 6 lbs 6 oz at term.    IOL which went well.     delivery complications:  No  breast feeding:  No, milk supply was not that good and stopped a couple weeks ago.   bladder problems:  No  bowel problems/hemorrhoids:  No  episiotomy/laceration/incision healed? Yes  vaginal flow:  None, no period yet  contraception:  oral contraceptives  emotional adjustment:  doing well, happy and tired --- very tired.  Not sure if it is normal.  Baby is a good sleeper.   back to work:  Not for several months.     PHQ-9 SCORE 2019 12/3/2019 2020   PHQ-9 Total Score 5 5 5     APOLINAR-7 SCORE 2019 12/3/2019 2020   Total Score 5 2 4       Lab Results   Component Value Date    PAP NIL 2019    + HR HPV        OBJECTIVE:  Blood pressure 117/78, pulse 95, temperature 98.7  F (37.1  C), temperature source Oral, height 1.689 m (5' 6.5\"), weight 92.9 kg (204 lb 12.8 oz), SpO2 97 %, not currently breastfeeding.   General - " pleasant female in no acute distress.  Breast - no nodularity, asymmetry or nipple discharge bilaterally.  Abdomen - soft, nontender, nondistended, no hepatosplenomegaly.  Pelvic - EG: normal adult female, BUS: within normal limits, Vagina: well rugated, no discharge, Cervix: no lesions or CMT, Uterus: firm, normal sized and nontender, Adnexae: no masses or tenderness.  Rectovaginal - deferred.    ASSESSMENT:  normal postpartum exam  Encounter Diagnoses   Name Primary?     Routine postpartum follow-up Yes     Gestational diabetes mellitus (GDM), delivered      Liver disease, chronic, due to alcohol (H)      Lymphadenopathy            PLAN:   Reviewed her CTS prior to her pregnancy from her visit to ED in January 2019.    Abnormal CTS with plan for repeat CTS in 6 months.  Since she was pregnant, the repeat CTS was never done so she will be referred back to Millie Moore for follow up not that she is delivered.      Discussed contraception and she will need to have liver evaluated prior to initiating oral contraceptive pills.  If eval is normal, we can prescribe oral contraceptive pills for her.  Until we can get her started on oral contraceptive pills', she will use condoms for contraception.     May resume normal activities without restrictions    Pap smear was not  done today -- she will be due in June for repeat pap smear d/t HR HPV    labs -- future order for 2 hr GTT non pregnant.      Leslye White MD

## 2020-02-11 NOTE — Clinical Note
Peterson Pinto, This patient saw you a yr ago for liver disease d/t alcohol use.  She has been sober just went thru a successful pregnancy.  She is 6 wks PP now and needs repeat CTS per your note from last yr to follow up on lymphadenopathy.  She will be seeing you next week and would like to review this with you.  She was hoping to start on oral contraceptive pills but I need to know that her liver is fine before I prescribe them.  Thanks!!Leslye

## 2020-02-12 ASSESSMENT — ANXIETY QUESTIONNAIRES: GAD7 TOTAL SCORE: 4

## 2020-02-17 ENCOUNTER — ANCILLARY PROCEDURE (OUTPATIENT)
Dept: GENERAL RADIOLOGY | Facility: CLINIC | Age: 30
End: 2020-02-17
Attending: NURSE PRACTITIONER
Payer: COMMERCIAL

## 2020-02-17 ENCOUNTER — OFFICE VISIT (OUTPATIENT)
Dept: FAMILY MEDICINE | Facility: CLINIC | Age: 30
End: 2020-02-17
Payer: COMMERCIAL

## 2020-02-17 ENCOUNTER — DOCUMENTATION ONLY (OUTPATIENT)
Dept: OBGYN | Facility: CLINIC | Age: 30
End: 2020-02-17

## 2020-02-17 VITALS
DIASTOLIC BLOOD PRESSURE: 70 MMHG | TEMPERATURE: 97.9 F | BODY MASS INDEX: 32.43 KG/M2 | RESPIRATION RATE: 22 BRPM | HEART RATE: 88 BPM | OXYGEN SATURATION: 99 % | SYSTOLIC BLOOD PRESSURE: 122 MMHG | WEIGHT: 206.6 LBS | HEIGHT: 67 IN

## 2020-02-17 DIAGNOSIS — I34.0 MILD MITRAL REGURGITATION BY PRIOR ECHOCARDIOGRAM: ICD-10-CM

## 2020-02-17 DIAGNOSIS — M25.511 RIGHT SHOULDER PAIN, UNSPECIFIED CHRONICITY: ICD-10-CM

## 2020-02-17 DIAGNOSIS — K76.9 LIVER LESION: ICD-10-CM

## 2020-02-17 DIAGNOSIS — R91.8 PULMONARY NODULES: ICD-10-CM

## 2020-02-17 DIAGNOSIS — M25.511 RIGHT SHOULDER PAIN, UNSPECIFIED CHRONICITY: Primary | ICD-10-CM

## 2020-02-17 DIAGNOSIS — R59.1 LYMPHADENOPATHY: ICD-10-CM

## 2020-02-17 PROCEDURE — 73030 X-RAY EXAM OF SHOULDER: CPT | Mod: RT

## 2020-02-17 PROCEDURE — 99213 OFFICE O/P EST LOW 20 MIN: CPT | Performed by: NURSE PRACTITIONER

## 2020-02-17 ASSESSMENT — MIFFLIN-ST. JEOR: SCORE: 1676.82

## 2020-02-17 ASSESSMENT — PAIN SCALES - GENERAL: PAINLEVEL: MODERATE PAIN (4)

## 2020-02-17 NOTE — PATIENT INSTRUCTIONS
[facility]     Discharged by : Millie Moore NP  Paper scripts provided to patient :  none     If you have any questions regarding your visit please contact your care team:     Team Sherry              Clinic Hours Telephone Number     Dr. Wilfredo Moore, [place]   7am-7pm  Monday - Thursday   7am-5pm  Fridays  [phone]   (Appointment scheduling available 24/7)     RN Line  [phone] option 2     Urgent Care - [place] - 11am-9pm Monday-Friday Saturday-Sunday- 9am-5pm     [place] -   5pm-9pm Monday-Friday Saturday-Sunday- 9am-5pm    [phone] - [place]    [phone] - [place]     For a Price Quote for your services, please call our Consumer Price Line at [phone].     What options do I have for visits at the clinic other than the traditional office visit?     To expand how we care for you, many of our providers are utilizing electronic visits (e-visits) and telephone visits, when medically appropriate, for interactions with their patients rather than a visit in the clinic. We also offer nurse visits for many medical concerns. Just like any other service, we will bill your insurance company for this type of visit based on time spent on the phone with your provider. Not all insurance companies cover these visits. Please check with your medical insurance if this type of visit is covered. You will be responsible for any charges that are not paid by your insurance.     E-visits via [employer]Fi[employer]: generally incur a $45.00 fee.     Telephone visits:  Time spent on the phone: *charged based on time that is spent on the phone in increments of 10 minutes. Estimated cost:   5-10 mins $30.00   11-20 mins. $59.00   21-30 mins. $85.00       Use [employer]FiK[employer]o (secure email communication and access to your chart) to send your primary care provider a message or make an appointment. Ask someone on your Team how to sign up for  Jusp.     As always, Thank you for trusting us with your health care needs!      [facility]:    Scheduling Appointments  Bruce Kruger [facility]  Call: [phone]    Wilfred Collazo [facility]  Call: [phone]    [facility]  Call: [phone]    For Gastroenterology referrals   [facility]   Clinics and Surgery Center, 4th Floor   [address] [place] [address]   Appointments: [phone]    WHERE TO GO FOR CARE?    Clinic    Make an appointment if you:       Are sick (cold, cough, flu, sore throat, earache or in pain).       Have a small injury (sprain, small cut, burn or broken bone).       Need a physical exam, Pap smear, vaccine or prescription refill.       Have questions about your health or medicines.    To reach us:      Call [phone] ([phone]). Open 24 hours every day. (For counseling services, call [phone].)    Log into Jusp at [email or web address]. (Visit [email or web address] to create an account.) Hospital emergency room    An emergency is a serious or life- threatening problem that must be treated right away.    Call [phone] or get to the hospital if you have:      Very bad or sudden:            - Chest pain or pressure         - Bleeding         - Head or belly pain         - Dizziness or trouble seeing, walking or                          Speaking      Problems breathing      Blood in your vomit or you are coughing up blood      A major injury (knocked out, loss of a finger or limb, rape, broken bone protruding from skin)    A mental health crisis. (Or call the Mental Health Crisis line at 1-968.396.4586 or Suicide Prevention Hotline at 1-774.384.3873.)    Open 24 hours every day. You don't need an appointment.     Urgent care    Visit urgent care for sickness or small injuries when the clinic is closed. You don't need an appointment. To check hours or find an urgent care near you,  visit [email or web address]. Online care    Get online care from [facility] for more than 70 common problems, like colds, allergies and infections. Open 24 hours every day at:   [email or web address]   Need help deciding?    For advice about where to be seen, you may call your clinic and ask to speak with a nurse. We're here for you 24 hours every day.         If you are deaf or hard of hearing, please let us know. We provide many free services including sign language interpreters, oral interpreters, TTYs, telephone amplifiers, note takers and written materials.            Procedure to be scheduled at [facility]    Comments for the Cardiologist 1 year follow up mild mitral regurgitation on 1/18/2019 ECHO for new murmur

## 2020-02-17 NOTE — PROGRESS NOTES
[facility] will be sharing updates with you on Maternal Child Health Referral requests for home care services.  This is for care coordination purposes and alert you to referral status.  We received the referral for  Karla Gordon; MRN [id number] and want to update you:      Home visit for postpartum/  assessment and education offered to patient.  Patient declined visit.  Advised to follow up with Primary Care Providers for mom and baby.      Sincerely [facility]  Sheree Bah  [phone]

## 2020-02-17 NOTE — PROGRESS NOTES
Subjective     Karla Gordon is a 29 year old female who presents to clinic today for the following health issues:    HPI   Shoulder Pain    Onset: years     Description:   Location: right shoulder- Had surgery in 2015 to repair (partial right shoulder replacement)  Character: Dull ache and Stabbing    Intensity: moderate    Progression of Symptoms: worse- hears a cracking sound at times.    Accompanying Signs & Symptoms:  Other symptoms: none    History:   Previous similar pain: YES- reason for surgery years ago       Precipitating factors:   Trauma or overuse: YES- accident - Fx shoulder in 2013.  Had shoulder fracture and humerus fractures.  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    Alleviating factors:  Improved by: stretching and acetaminophen    Therapies Tried and outcome: some relief     Was supposed to get an x-ray yearly per her previous Orthopedic recommendation (previously went to an Orthopedic in [place]).  Has not had an x-ray for a few years.  Wants to get an x-ray today and to reestablish care with Orthopedic.  Her previous Orthopedic had told her she would likely develop arthritis.   Dull ache pain.  Sometimes a sharp stabbing pain.  Stiff shoulder.  Reduced internal rotation.    Patient Active Problem List   Diagnosis     Numbness and tingling of foot     Tinnitus, bilateral     Plugged feeling in ear, bilateral     Tobacco use disorder     Thrombocytopenia (H)     Fatty liver     Macrocytic anemia     Alcohol use disorder, moderate, dependence (H)     Dietary folate deficiency anemia     Alcoholic hepatitis with ascites     Liver lesion     Opacity of lung on imaging study     Lymphadenopathy     Mild mitral regurgitation by prior echocardiogram     Cervical high risk HPV (human papillomavirus) test positive     Gestational diabetes     Past Surgical History:   Procedure Laterality Date     ABDOMINAL PARACENTESIS  01/24/2019    US  Paracentesis with Imaging at [facility].  Fluid removed: 5910 mL     C ANESTH,SHOULDER REPLACEMENT Right 2015    right partial shoulder replacement at age 25     HC TOOTH EXTRACTION W/FORCEP  2015     ORTHOPEDIC SURGERY         Social History     Tobacco Use     Smoking status: Current Every Day Smoker     Packs/day: 0.50     Years: 7.00     Pack years: 3.50     Types: Cigarettes     Smokeless tobacco: Never Used   Substance Use Topics     Alcohol use: Not Currently     Comment: daily, few drinks a day, not during pregnancy     Family History   Problem Relation Age of Onset     Cancer Mother 62        bladder cancer     Hypertension Mother      Psoriasis Father      No Known Problems Brother      No Known Problems Sister      No Known Problems Son      Coronary Artery Disease Paternal Grandmother      Coronary Artery Disease Paternal Grandfather      Diabetes No family hx of      Hyperlipidemia No family hx of      Breast Cancer No family hx of      Colon Cancer No family hx of          Current Outpatient Medications   Medication Sig Dispense Refill     acetaminophen (TYLENOL) 325 MG tablet Take 2 tablets (650 mg) by mouth every 6 hours as needed for mild pain Start after Delivery. 100 tablet 0     BIOTIN PO Take 1 tablet by mouth daily       diphenhydrAMINE-acetaminophen (TYLENOL PM)  MG tablet Take 1 tablet by mouth nightly as needed for sleep       ferrous sulfate (FE TABS) 325 (65 Fe) MG EC tablet Take 1 tablet (325 mg) by mouth daily 60 tablet 1     FOLIC ACID PO Take 1 mg by mouth daily       Multiple Vitamins-Minerals (CENTRUM ULTRA WOMENS PO) Take 1 tablet by mouth daily       Prenatal Vit-Fe Fumarate-FA (PRENATAL VITAMIN PO)        sertraline (ZOLOFT) 25 MG tablet Take 1.5 tablets (37.5 mg) by mouth daily 30 tablet 3     Allergies   Allergen Reactions     Lexapro [Escitalopram] Nausea     Severe nausea; does not wish to take again     Spinach      Mouth gets numb     BP Readings from Last 3 Encounters:  "  02/17/20 122/70   02/11/20 117/78   12/31/19 116/75    Wt Readings from Last 3 Encounters:   02/17/20 93.7 kg (206 lb 9.6 oz)   02/11/20 92.9 kg (204 lb 12.8 oz)   12/29/19 92.5 kg (204 lb)                    Reviewed and updated as needed this visit by Provider  Tobacco  Allergies  Meds  Problems  Med Hx  Surg Hx  Fam Hx         Review of Systems   ROS COMP: Constitutional, HEENT, cardiovascular, pulmonary, musculoskeletal and neurologic systems are negative, except as otherwise noted.      Objective    /70 (BP Location: Right arm, Patient Position: Chair, Cuff Size: Adult Large)   Pulse 88   Temp 97.9  F (36.6  C) (Oral)   Resp 22   Ht 1.689 m (5' 6.5\")   Wt 93.7 kg (206 lb 9.6 oz)   SpO2 99%   BMI 32.85 kg/m    Body mass index is 32.85 kg/m .  Physical Exam   GENERAL: healthy, alert, no distress and over weight  CV:  No murmur appreciated today  MS: right shoulder- tenderness anteriorly and laterally, reduced internal rotation due to pain.  Equal strength bilateral upper extremities  PSYCH: mentation appears normal, affect normal/bright    Diagnostic Test Results:  Right Shoulder X-ray:  Await radiologist's reading.  No obvious concerns per my reading.        Assessment & Plan     1. Right shoulder pain, unspecified chronicity  Patient desires to reestablish care with Orthopedic as she has been lost to follow [place].  - XR Shoulder Right G/E 3 Views; Future  - Orthopedic & Spine  Referral; Future    2. Pulmonary nodules  Patient due for follow up imaging to check stability.  - CT Chest Abdomen Pelvis w/o Contrast; Future    3. Liver lesion  Patient is due for follow up imaging to check stability.  - CT Chest Abdomen Pelvis w/o Contrast; Future    4. Lymphadenopathy  Patient is due for follow up imaging to check for resolution.  - CT Chest Abdomen Pelvis w/o Contrast; Future    5. Mild mitral regurgitation by prior echocardiogram  Patient due for 1 " "year follow up echo and she was provided with the phone number for scheduling.       Tobacco Cessation:   reports that she has been smoking cigarettes. She has a 3.50 pack-year smoking history. She has never used smokeless tobacco.  Tobacco Cessation Action Plan: Information offered: Patient not interested at this time      BMI:   Estimated body mass index is 32.85 kg/m  as calculated from the following:    Height as of this encounter: 1.689 m (5' 6.5\").    Weight as of this encounter: 93.7 kg (206 lb 9.6 oz).           No follow-ups on file.    Millie Moore NP  [facility]      "

## 2020-02-19 ENCOUNTER — ANCILLARY PROCEDURE (OUTPATIENT)
Dept: CT IMAGING | Facility: CLINIC | Age: 30
End: 2020-02-19
Attending: NURSE PRACTITIONER
Payer: COMMERCIAL

## 2020-02-19 DIAGNOSIS — K76.9 LIVER LESION: ICD-10-CM

## 2020-02-19 DIAGNOSIS — R91.8 PULMONARY NODULES: ICD-10-CM

## 2020-02-19 DIAGNOSIS — R59.1 LYMPHADENOPATHY: ICD-10-CM

## 2020-02-20 ENCOUNTER — PRE VISIT (OUTPATIENT)
Dept: ORTHOPEDICS | Facility: CLINIC | Age: 30
End: 2020-02-20

## 2020-02-20 NOTE — TELEPHONE ENCOUNTER
Patient returned call to clinic.  Patient had surgery completed in 2015 in [facility] in [place] through [facility] - Dr. Ghosh.    Patient notified she may need to come early for additional x-ray views.

## 2020-02-20 NOTE — TELEPHONE ENCOUNTER
Right shoulder pain, XR 2/17/20, S/p right shoulder ?hemiarthroplasty, sx: 2015 (accident with fx 2013), ref'd by PCP, need op notes    Called and left VM with CB to find out where Pt had surgery so we can try to get op notes.    Called [facility] ([phone]), left detailed VM to get records. They called back, sent fax requesting records, they will fax us all related records.    Matthew Wells RN

## 2020-02-24 ENCOUNTER — OFFICE VISIT (OUTPATIENT)
Dept: ORTHOPEDICS | Facility: CLINIC | Age: 30
End: 2020-02-24
Payer: COMMERCIAL

## 2020-02-24 ENCOUNTER — ANCILLARY PROCEDURE (OUTPATIENT)
Dept: GENERAL RADIOLOGY | Facility: CLINIC | Age: 30
End: 2020-02-24
Attending: ORTHOPAEDIC SURGERY
Payer: COMMERCIAL

## 2020-02-24 VITALS
SYSTOLIC BLOOD PRESSURE: 119 MMHG | OXYGEN SATURATION: 97 % | HEART RATE: 88 BPM | DIASTOLIC BLOOD PRESSURE: 75 MMHG | HEIGHT: 66 IN | BODY MASS INDEX: 32.98 KG/M2 | WEIGHT: 205.2 LBS

## 2020-02-24 DIAGNOSIS — M25.511 RIGHT SHOULDER PAIN, UNSPECIFIED CHRONICITY: ICD-10-CM

## 2020-02-24 DIAGNOSIS — Z96.611 HISTORY OF ARTHROPLASTY OF RIGHT SHOULDER: Primary | ICD-10-CM

## 2020-02-24 PROBLEM — K76.0 FATTY LIVER: Status: ACTIVE | Noted: 2018-03-22

## 2020-02-24 PROBLEM — R91.1 LUNG NODULE: Status: ACTIVE | Noted: 2019-01-16

## 2020-02-24 PROCEDURE — 99203 OFFICE O/P NEW LOW 30 MIN: CPT | Performed by: ORTHOPAEDIC SURGERY

## 2020-02-24 PROCEDURE — 73030 X-RAY EXAM OF SHOULDER: CPT | Mod: RT | Performed by: RADIOLOGY

## 2020-02-24 ASSESSMENT — PAIN SCALES - GENERAL: PAINLEVEL: MILD PAIN (3)

## 2020-02-24 ASSESSMENT — MIFFLIN-ST. JEOR: SCORE: 1666.5

## 2020-02-24 NOTE — PROGRESS NOTES
"CHIEF CONCERN:  Right shoulder pain    HISTORY OF PRESENT ILLNESS:  Karla is a pleasant 29 year old right hand dominant female who presents for evaluation of her right shoulder. At age 21 or 22 she sustained a proximal humerus fracture. This was treated nonoperatively. 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  This surgery was performed at [facility] in [facility]. She has had intermittent soreness and stiffness since the surgery however, pain and stiffness have been more constant for the last 4 months.  She hears cracking/popping sounds in the shoulder and feels a sensation of \"sliding\" of movement of the implant.  She also has intermittent sharp pain in the anterior shoulder.  She rates her pain is a 3 on a scale of 1-10 today.      Past Medical History:   Diagnosis Date     Alcohol use disorder, mild, abuse 11/19/2018     Cervical high risk HPV (human papillomavirus) test positive 06/25/2019    See problem list.      Dietary folate deficiency anemia 11/20/2018     Fatty liver 3/22/2018    3/21/18:  ALT 76,  (4.2 xULN).   4/2/18:  Hepatitis B and C negative  Patient also with thrombocytopenia and mild macrocytic anemia  11/16/18:  IMPRESSION:     1. Increased hepatic echogenicity as can be seen in intrinsic parenchymal disease such as steatosis. 2. Liver is enlarged, measuring 21.6 cm. 3. Spleen is borderline enlarged, measuring 1.9 cm     Macrocytic anemia 4/3/2018     Thrombocytopenia (H) 3/22/2018    3/21/18:  Platelets noted at 92.   4/2/18:  Platelets 92.  Mild macrocytic anemia.  Peripheral smear pending, consider Hematology referral     Tobacco use disorder 3/21/2018       Past Surgical History:   Procedure Laterality Date     ABDOMINAL PARACENTESIS  01/24/2019    US Paracentesis with Imaging at [facility].  Fluid removed: 5910 mL     C ANESTH,SHOULDER REPLACEMENT Right 2015    right partial shoulder replacement at age 25     HC " TOOTH EXTRACTION W/FORCEP  2015     ORTHOPEDIC SURGERY         Current Outpatient Medications   Medication Sig Dispense Refill     acetaminophen (TYLENOL) 325 MG tablet Take 2 tablets (650 mg) by mouth every 6 hours as needed for mild pain Start after Delivery. 100 tablet 0     BIOTIN PO Take 1 tablet by mouth daily       diphenhydrAMINE-acetaminophen (TYLENOL PM)  MG tablet Take 1 tablet by mouth nightly as needed for sleep       ferrous sulfate (FE TABS) 325 (65 Fe) MG EC tablet Take 1 tablet (325 mg) by mouth daily 60 tablet 1     FOLIC ACID PO Take 1 mg by mouth daily       Multiple Vitamins-Minerals (CENTRUM ULTRA WOMENS PO) Take 1 tablet by mouth daily       Prenatal Vit-Fe Fumarate-FA (PRENATAL VITAMIN PO)        sertraline (ZOLOFT) 25 MG tablet Take 1.5 tablets (37.5 mg) by mouth daily 30 tablet 3          Allergies   Allergen Reactions     Lexapro [Escitalopram] Nausea     Severe nausea; does not wish to take again     Spinach      Mouth gets numb       SOCIAL HISTORY:    Social History     Socioeconomic History     Marital status:      Spouse name: Not on file     Number of children: Not on file     Years of education: Not on file     Highest education level: Not on file   Occupational History     Not on file   Social Needs     Financial resource strain: Not on file     Food insecurity:     Worry: Not on file     Inability: Not on file     Transportation needs:     Medical: Not on file     Non-medical: Not on file   Tobacco Use     Smoking status: Current Every Day Smoker     Packs/day: 0.50     Years: 7.00     Pack years: 3.50     Types: Cigarettes     Smokeless tobacco: Never Used   Substance and Sexual Activity     Alcohol use: Not Currently     Comment: daily, few drinks a day, not during pregnancy     Drug use: No     Sexual activity: Yes     Partners: Male     Birth control/protection: None   Lifestyle     Physical activity:     Days per week: Not on file     Minutes per session: Not on  file     Stress: Not on file   Relationships     Social connections:     Talks on phone: Not on file     Gets together: Not on file     Attends Latter day service: Not on file     Active member of club or organization: Not on file     Attends meetings of clubs or organizations: Not on file     Relationship status: Not on file     Intimate partner violence:     Fear of current or ex partner: Not on file     Emotionally abused: Not on file     Physically abused: Not on file     Forced sexual activity: Not on file   Other Topics Concern     Parent/sibling w/ CABG, MI or angioplasty before 65F 55M? Not Asked   Social History Narrative     Not on file       FAMILY HISTORY: Reviewed in EMR      REVIEW OF SYSTEMS: Positive for that noted in past medical history and history of present illness and otherwise reviewed in EMR     PHYSICAL EXAM:    Adult female in no acute distress. Articulates and communicates with normal affect.  Respirations even and unlabored  Focused upper extremity exam: Skin intact. No erythema. Sensation intact all dermatomes into the hand to light touch. EPL, FPL, and Intrinsics intact. Right shoulder active motion is FE to 175, ER at side to 60, and IR to T10 with pain. Left shoulder active motion is FE to 180, ER to 80, and IR to T8.  Negative Neer and Funez on right. No pain on palpation over the AC joint. No pain on palpation over the long head of the biceps. FE and ER strength on R are 5/5.    IMAGING:  Right shoulder XR demonstrate shoulder hemiarthroplasty components in place. Components in good position. There is decreased joint space suggestive of chondral thinning/loss on the glenoid.    ASSESSMENT:    1. Status post right shoulder hemiarthroplasty  2. Right glenoid chondral loss    PLAN:  I discussed the imaging with the patient outlining that there is evidence for progression of chondral wear on the glenoid. We discussed the non-operative treatment options and desire to defer conversion to  TSA as long as nonoperative measures adequately manage her symptoms. We discussed the option of a GH US guided cortisone injection. She would like to reserve that option until or if she develops a pain flare. We will re-evalauate her chondral wear with new XR in 1 year otherwise.     Yissel Prince MD

## 2020-02-24 NOTE — NURSING NOTE
"Karla Haileykristi Gordon's chief complaint for this visit includes:  Chief Complaint   Patient presents with     Right Shoulder - Pain     history of AVN of the right humeral head - Right shoulder hemiarthroplasty DOS: 7/1/2015     PCP: Millie Moore    Referring Provider:  Millie Moore NP  [address] [place] [address]    /75 (BP Location: Left arm, Patient Position: Sitting, Cuff Size: Adult Regular)   Pulse 88   Ht 1.683 m (5' 6.25\")   Wt 93.1 kg (205 lb 3.2 oz)   SpO2 97%   BMI 32.87 kg/m    Mild Pain (3)     Do you need any medication refills at today's visit? No    Preeti Dougherty CMA        "

## 2020-02-24 NOTE — LETTER
"    2/24/2020         RE: Karla Gordon  [address]  [place] [address]        Dear Colleague,    Thank you for referring your patient, Karla Gordon, to the [facility]. Please see a copy of my visit note below.    CHIEF CONCERN:  Right shoulder pain    HISTORY OF PRESENT ILLNESS:  Karla is a pleasant 29 year old right hand dominant female who presents for evaluation of her right shoulder. At age 21 or 22 she sustained a proximal humerus fracture. This was treated nonoperatively. 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  This surgery was performed at [facility] in [facility]. She has had intermittent soreness and stiffness since the surgery however, pain and stiffness have been more constant for the last 4 months.  She hears cracking/popping sounds in the shoulder and feels a sensation of \"sliding\" of movement of the implant.  She also has intermittent sharp pain in the anterior shoulder.  She rates her pain is a 3 on a scale of 1-10 today.      Past Medical History:   Diagnosis Date     Alcohol use disorder, mild, abuse 11/19/2018     Cervical high risk HPV (human papillomavirus) test positive 06/25/2019    See problem list.      Dietary folate deficiency anemia 11/20/2018     Fatty liver 3/22/2018    3/21/18:  ALT 76,  (4.2 xULN).   4/2/18:  Hepatitis B and C negative  Patient also with thrombocytopenia and mild macrocytic anemia  11/16/18:  IMPRESSION:     1. Increased hepatic echogenicity as can be seen in intrinsic parenchymal disease such as steatosis. 2. Liver is enlarged, measuring 21.6 cm. 3. Spleen is borderline enlarged, measuring 1.9 cm     Macrocytic anemia 4/3/2018     Thrombocytopenia (H) 3/22/2018    3/21/18:  Platelets noted at 92.   4/2/18:  Platelets 92.  Mild macrocytic anemia.  Peripheral smear pending, consider Hematology referral     Tobacco use disorder " 3/21/2018       Past Surgical History:   Procedure Laterality Date     ABDOMINAL PARACENTESIS  01/24/2019    US Paracentesis with Imaging at [facility].  Fluid removed: 5910 mL     C ANESTH,SHOULDER REPLACEMENT Right 2015    right partial shoulder replacement at age 25     HC TOOTH EXTRACTION W/FORCEP  2015     ORTHOPEDIC SURGERY         Current Outpatient Medications   Medication Sig Dispense Refill     acetaminophen (TYLENOL) 325 MG tablet Take 2 tablets (650 mg) by mouth every 6 hours as needed for mild pain Start after Delivery. 100 tablet 0     BIOTIN PO Take 1 tablet by mouth daily       diphenhydrAMINE-acetaminophen (TYLENOL PM)  MG tablet Take 1 tablet by mouth nightly as needed for sleep       ferrous sulfate (FE TABS) 325 (65 Fe) MG EC tablet Take 1 tablet (325 mg) by mouth daily 60 tablet 1     FOLIC ACID PO Take 1 mg by mouth daily       Multiple Vitamins-Minerals (CENTRUM ULTRA WOMENS PO) Take 1 tablet by mouth daily       Prenatal Vit-Fe Fumarate-FA (PRENATAL VITAMIN PO)        sertraline (ZOLOFT) 25 MG tablet Take 1.5 tablets (37.5 mg) by mouth daily 30 tablet 3          Allergies   Allergen Reactions     Lexapro [Escitalopram] Nausea     Severe nausea; does not wish to take again     Spinach      Mouth gets numb       SOCIAL HISTORY:    Social History     Socioeconomic History     Marital status:      Spouse name: Not on file     Number of children: Not on file     Years of education: Not on file     Highest education level: Not on file   Occupational History     Not on file   Social Needs     Financial resource strain: Not on file     Food insecurity:     Worry: Not on file     Inability: Not on file     Transportation needs:     Medical: Not on file     Non-medical: Not on file   Tobacco Use     Smoking status: Current Every Day Smoker     Packs/day: 0.50     Years: 7.00     Pack years: 3.50     Types: Cigarettes     Smokeless tobacco: Never Used   Substance and Sexual Activity     Alcohol  use: Not Currently     Comment: daily, few drinks a day, not during pregnancy     Drug use: No     Sexual activity: Yes     Partners: Male     Birth control/protection: None   Lifestyle     Physical activity:     Days per week: Not on file     Minutes per session: Not on file     Stress: Not on file   Relationships     Social connections:     Talks on phone: Not on file     Gets together: Not on file     Attends Christian service: Not on file     Active member of club or organization: Not on file     Attends meetings of clubs or organizations: Not on file     Relationship status: Not on file     Intimate partner violence:     Fear of current or ex partner: Not on file     Emotionally abused: Not on file     Physically abused: Not on file     Forced sexual activity: Not on file   Other Topics Concern     Parent/sibling w/ CABG, MI or angioplasty before 65F 55M? Not Asked   Social History Narrative     Not on file       FAMILY HISTORY: Reviewed in EMR      REVIEW OF SYSTEMS: Positive for that noted in past medical history and history of present illness and otherwise reviewed in EMR     PHYSICAL EXAM:    Adult female in no acute distress. Articulates and communicates with normal affect.  Respirations even and unlabored  Focused upper extremity exam: Skin intact. No erythema. Sensation intact all dermatomes into the hand to light touch. EPL, FPL, and Intrinsics intact. Right shoulder active motion is FE to 175, ER at side to 60, and IR to T10 with pain. Left shoulder active motion is FE to 180, ER to 80, and IR to T8.  Negative Neer and Funez on right. No pain on palpation over the AC joint. No pain on palpation over the long head of the biceps. FE and ER strength on R are 5/5.    IMAGING:  Right shoulder XR demonstrate shoulder hemiarthroplasty components in place. Components in good position. There is decreased joint space suggestive of chondral thinning/loss on the glenoid.    ASSESSMENT:    1. Status post right  shoulder hemiarthroplasty  2. Right glenoid chondral loss    PLAN:  I discussed the imaging with the patient outlining that there is evidence for progression of chondral wear on the glenoid. We discussed the non-operative treatment options and desire to defer conversion to TSA as long as nonoperative measures adequately manage her symptoms. We discussed the option of a GH US guided cortisone injection. She would like to reserve that option until or if she develops a pain flare. We will re-evalauate her chondral wear with new XR in 1 year otherwise.     Yissel Prince MD      Again, thank you for allowing me to participate in the care of your patient.        Sincerely,        Yissel Prince MD

## 2020-02-24 NOTE — PATIENT INSTRUCTIONS
Thanks for coming today.  [facility]  [address] [place] [address]    To schedule future appointments in Ortho Clinic, you may call [phone].    To schedule ordered imaging by your provider:   Call Central [facility] [phone]    To schedule an injection ordered by your provider:  Call [facility] Injection scheduling line: [phone]  [employer] available online at:  [email or web address]    Please call if any further questions or concerns ([phone]).  Clinic hours 8 am to 5 pm.    Return to clinic (call) if symptoms worsen or fail to improve.

## 2020-04-15 ENCOUNTER — MYC MEDICAL ADVICE (OUTPATIENT)
Dept: FAMILY MEDICINE | Facility: CLINIC | Age: 30
End: 2020-04-15

## 2020-04-17 ENCOUNTER — VIRTUAL VISIT (OUTPATIENT)
Dept: FAMILY MEDICINE | Facility: CLINIC | Age: 30
End: 2020-04-17
Payer: COMMERCIAL

## 2020-04-17 DIAGNOSIS — F41.1 GENERALIZED ANXIETY DISORDER: Primary | ICD-10-CM

## 2020-04-17 DIAGNOSIS — Z30.016 ENCOUNTER FOR INITIAL PRESCRIPTION OF TRANSDERMAL PATCH HORMONAL CONTRACEPTIVE DEVICE: ICD-10-CM

## 2020-04-17 PROCEDURE — 99442 ZZC PHYSICIAN TELEPHONE EVALUATION 11-20 MIN: CPT | Performed by: NURSE PRACTITIONER

## 2020-04-17 RX ORDER — HYDROXYZINE HYDROCHLORIDE 25 MG/1
25 TABLET, FILM COATED ORAL 2 TIMES DAILY PRN
Qty: 30 TABLET | Refills: 3 | Status: SHIPPED | OUTPATIENT
Start: 2020-04-17 | End: 2020-09-01

## 2020-04-17 RX ORDER — NORELGESTROMIN AND ETHINYL ESTRADIOL 35; 150 UG/MG; UG/MG
PATCH TRANSDERMAL
Qty: 9 PATCH | Refills: 3 | Status: SHIPPED | OUTPATIENT
Start: 2020-04-17 | End: 2021-03-11

## 2020-04-17 ASSESSMENT — ANXIETY QUESTIONNAIRES
3. WORRYING TOO MUCH ABOUT DIFFERENT THINGS: NEARLY EVERY DAY
7. FEELING AFRAID AS IF SOMETHING AWFUL MIGHT HAPPEN: NOT AT ALL
2. NOT BEING ABLE TO STOP OR CONTROL WORRYING: MORE THAN HALF THE DAYS
1. FEELING NERVOUS, ANXIOUS, OR ON EDGE: NEARLY EVERY DAY
6. BECOMING EASILY ANNOYED OR IRRITABLE: SEVERAL DAYS
GAD7 TOTAL SCORE: 13
5. BEING SO RESTLESS THAT IT IS HARD TO SIT STILL: SEVERAL DAYS

## 2020-04-17 ASSESSMENT — PATIENT HEALTH QUESTIONNAIRE - PHQ9
SUM OF ALL RESPONSES TO PHQ QUESTIONS 1-9: 7
5. POOR APPETITE OR OVEREATING: NEARLY EVERY DAY

## 2020-04-17 NOTE — PROGRESS NOTES
"Karla Gordon is a 29 year old female who is being evaluated via a billable telephone visit.      The patient has been notified of following:     \"This telephone visit will be conducted via a call between you and your physician/provider. We have found that certain health care needs can be provided without the need for a physical exam.  This service lets us provide the care you need with a short phone conversation.  If a prescription is necessary we can send it directly to your pharmacy.  If lab work is needed we can place an order for that and you can then stop by our lab to have the test done at a later time.    Telephone visits are billed at different rates depending on your insurance coverage. During this emergency period, for some insurers they may be billed the same as an in-person visit.  Please reach out to your insurance provider with any questions.    If during the course of the call the physician/provider feels a telephone visit is not appropriate, you will not be charged for this service.\"    Patient has given verbal consent for Telephone visit?  Yes    How would you like to obtain your AVS? MyChart    Subjective     Karla Gordon is a 29 year old female who presents to clinic today for the following health issues:    Depression Followup    How are you doing with your depression since your last visit? Worsened and would like to discuss dose increase of Zoloft today.  She also would like to discuss Hydroxyzine as needed for anxiety.  She has used Hydroxyzine in the past with good effect.    Are you having other symptoms that might be associated with depression? No    Have you had a significant life event? Yes     Are you feeling anxious or having panic attacks?   Yes:  anxiety, no panic attacks yet but some physical symptoms but she has been able to calm herself down     Do you have any concerns with your use of alcohol or other drugs? No   With COVID-19 pandemic she is more on edge.  " Hard to sleep as well.  Her infant daughter sleeps through the night but she is not able to sleep well.  's work is shut down and on unemployment currently.    No longer on the oral birth control pill due to it caused headaches and heavy bleeding for 2 weeks so she stopped.  Ten years ago she used the patch and she would like to try the patch again.    Social History     Tobacco Use     Smoking status: Current Every Day Smoker     Packs/day: 0.50     Years: 7.00     Pack years: 3.50     Types: Cigarettes     Smokeless tobacco: Never Used   Substance Use Topics     Alcohol use: Not Currently     Comment: daily, few drinks a day, not during pregnancy     Drug use: No     PHQ 12/3/2019 2/11/2020 4/17/2020   PHQ-9 Total Score 5 5 7   Q9: Thoughts of better off dead/self-harm past 2 weeks Not at all Not at all Not at all     APOLINAR-7 SCORE 12/3/2019 2/11/2020 4/17/2020   Total Score 2 4 13     Last PHQ-9 4/17/2020   1.  Little interest or pleasure in doing things 0   2.  Feeling down, depressed, or hopeless 1   3.  Trouble falling or staying asleep, or sleeping too much 3   4.  Feeling tired or having little energy 2   5.  Poor appetite or overeating 0   6.  Feeling bad about yourself 0   7.  Trouble concentrating 0   8.  Moving slowly or restless 1   Q9: Thoughts of better off dead/self-harm past 2 weeks 0   PHQ-9 Total Score 7   Difficulty at work, home, or with people -     APOLINAR-7  4/17/2020   1. Feeling nervous, anxious, or on edge 3   2. Not being able to stop or control worrying 2   3. Worrying too much about different things 3   4. Trouble relaxing 3   5. Being so restless that it is hard to sit still 1   6. Becoming easily annoyed or irritable 1   7. Feeling afraid, as if something awful might happen 0   APOLINAR-7 Total Score 13   If you checked any problems, how difficult have they made it for you to do your work, take care of things at home, or get along with other people? -     Suicide Assessment Five-step  Evaluation and Treatment (SAFE-T)      Patient Active Problem List   Diagnosis     Numbness and tingling of foot     Tinnitus, bilateral     Plugged feeling in ear, bilateral     Tobacco use disorder     Thrombocytopenia (H)     Fatty liver     Macrocytic anemia     Alcohol use disorder, moderate, dependence (H)     Dietary folate deficiency anemia     Alcoholic hepatitis with ascites     Liver lesion     Lung nodule     Lymphadenopathy     Mild mitral regurgitation by prior echocardiogram     Cervical high risk HPV (human papillomavirus) test positive     Gestational diabetes     Past Surgical History:   Procedure Laterality Date     ABDOMINAL PARACENTESIS  01/24/2019    US Paracentesis with Imaging at [facility].  Fluid removed: 5910 mL     C ANESTH,SHOULDER REPLACEMENT Right 2015    right partial shoulder replacement at age 25     HC TOOTH EXTRACTION W/FORCEP  2015     ORTHOPEDIC SURGERY         Social History     Tobacco Use     Smoking status: Current Every Day Smoker     Packs/day: 0.50     Years: 7.00     Pack years: 3.50     Types: Cigarettes     Smokeless tobacco: Never Used   Substance Use Topics     Alcohol use: Not Currently     Comment: daily, few drinks a day, not during pregnancy     Family History   Problem Relation Age of Onset     Cancer Mother 62        bladder cancer     Hypertension Mother      Psoriasis Father      No Known Problems Brother      No Known Problems Sister      No Known Problems Son      Coronary Artery Disease Paternal Grandmother      Coronary Artery Disease Paternal Grandfather      Diabetes No family hx of      Hyperlipidemia No family hx of      Breast Cancer No family hx of      Colon Cancer No family hx of          Current Outpatient Medications   Medication Sig Dispense Refill     acetaminophen (TYLENOL) 325 MG tablet Take 2 tablets (650 mg) by mouth every 6 hours as needed for mild pain Start after Delivery. 100 tablet 0     BIOTIN PO Take 1 tablet by mouth daily        diphenhydrAMINE-acetaminophen (TYLENOL PM)  MG tablet Take 1 tablet by mouth nightly as needed for sleep       ferrous sulfate (FE TABS) 325 (65 Fe) MG EC tablet Take 1 tablet (325 mg) by mouth daily 60 tablet 1     FOLIC ACID PO Take 1 mg by mouth daily              Multiple Vitamins-Minerals (CENTRUM ULTRA WOMENS PO) Take 1 tablet by mouth daily              Prenatal Vit-Fe Fumarate-FA (PRENATAL VITAMIN PO)        sertraline (ZOLOFT) 25 MG tablet Take 1.5 tablets (37.5 mg) by mouth daily 30 tablet 3            Allergies   Allergen Reactions     Lexapro [Escitalopram] Nausea     Severe nausea; does not wish to take again     Spinach      Mouth gets numb     BP Readings from Last 3 Encounters:   02/24/20 119/75   02/17/20 122/70   02/11/20 117/78    Wt Readings from Last 3 Encounters:   02/24/20 93.1 kg (205 lb 3.2 oz)   02/17/20 93.7 kg (206 lb 9.6 oz)   02/11/20 92.9 kg (204 lb 12.8 oz)                    Reviewed and updated as needed this visit by Provider  Tobacco  Allergies  Meds  Problems  Med Hx  Surg Hx  Fam Hx         Review of Systems   ROS COMP: Constitutional, HEENT, cardiovascular, pulmonary, gi and gu systems are negative, except as otherwise noted.       Objective   Reported vitals:  There were no vitals taken for this visit.   Constitutional:  alert and cooperative.  Patient is speaking in full sentences.  No signs of distress.  Psychiatric: Alert and oriented times 3; coherent speech, normal rate and volume, able to articulate logical thoughts, able to abstract reason, no tangential thoughts, mood appropriate        Assessment/Plan:  1. Generalized anxiety disorder  Uncontrolled.  - Increase to sertraline (ZOLOFT) 50 MG tablet; Take 1 tablet (50 mg) by mouth daily  Dispense: 30 tablet; Refill: 5  - Start hydrOXYzine (ATARAX) 25 MG tablet; Take 1 tablet (25 mg) by mouth 2 times daily as needed for anxiety (sleep) May also take at bedtime as needed for sleep  Dispense: 30 tablet; Refill:  3  - Therapeutic listening provided.    2. Encounter for initial prescription of transdermal patch hormonal contraceptive device  Discussed with patient the indication and use of medication(s), risks/benefits, and potential adverse side effects.  Patient/guardian verbalized understanding and agreement with the plan.  - norelgestromin-ethinyl estradiol (ORTHO EVRA) 150-35 MCG/24HR patch; Remove old patch and apply new patch onto the skin once a week for 3 weeks (21 days). Do not wear patch week 4 (days 22-28), then repeat.  Dispense: 9 patch; Refill: 3  I cautioned patient about risk of clots (ie: DVT, PE, strokes, etc) with hormonal contraception and concurrent smoking and patient verbalized understanding of risks.    Return in about 4 weeks (around 5/15/2020) for if symptoms worsen or fail to improve.      Phone call duration:  14 minutes    .[email or web address]

## 2020-04-18 ASSESSMENT — ANXIETY QUESTIONNAIRES: GAD7 TOTAL SCORE: 13

## 2020-05-18 ENCOUNTER — MEDICAL CORRESPONDENCE (OUTPATIENT)
Dept: HEALTH INFORMATION MANAGEMENT | Facility: CLINIC | Age: 30
End: 2020-05-18

## 2020-06-25 ENCOUNTER — TRANSFERRED RECORDS (OUTPATIENT)
Dept: HEALTH INFORMATION MANAGEMENT | Facility: CLINIC | Age: 30
End: 2020-06-25

## 2020-07-10 ENCOUNTER — VIRTUAL VISIT (OUTPATIENT)
Dept: FAMILY MEDICINE | Facility: CLINIC | Age: 30
End: 2020-07-10
Payer: COMMERCIAL

## 2020-07-10 ENCOUNTER — MYC MEDICAL ADVICE (OUTPATIENT)
Dept: FAMILY MEDICINE | Facility: CLINIC | Age: 30
End: 2020-07-10

## 2020-07-10 DIAGNOSIS — F10.239 ALCOHOL DEPENDENCE WITH WITHDRAWAL WITH COMPLICATION (H): Primary | ICD-10-CM

## 2020-07-10 PROCEDURE — 99213 OFFICE O/P EST LOW 20 MIN: CPT | Mod: 95 | Performed by: INTERNAL MEDICINE

## 2020-07-10 RX ORDER — LORAZEPAM 1 MG/1
1 TABLET ORAL EVERY 8 HOURS PRN
Qty: 10 TABLET | Refills: 0 | Status: SHIPPED | OUTPATIENT
Start: 2020-07-10 | End: 2020-08-07

## 2020-07-10 NOTE — TELEPHONE ENCOUNTER
Patient read MyChart and scheduled with Dr. Mccabe today, so closing encounter.    Edilma Rodriguez RN

## 2020-07-10 NOTE — TELEPHONE ENCOUNTER
MyChart sent and will leave encounter open until read in case of response, then may close.     Edilma Rodriguez RN

## 2020-07-10 NOTE — PROGRESS NOTES
"Karla Gordon is a 29 year old female who is being evaluated via a billable telephone visit.      The patient has been notified of following:     \"This telephone visit will be conducted via a call between you and your physician/provider. We have found that certain health care needs can be provided without the need for a physical exam.  This service lets us provide the care you need with a short phone conversation.  If a prescription is necessary we can send it directly to your pharmacy.  If lab work is needed we can place an order for that and you can then stop by our lab to have the test done at a later time.    Telephone visits are billed at different rates depending on your insurance coverage. During this emergency period, for some insurers they may be billed the same as an in-person visit.  Please reach out to your insurance provider with any questions.    If during the course of the call the physician/provider feels a telephone visit is not appropriate, you will not be charged for this service.\"    Patient has given verbal consent for Telephone visit?  Yes    What phone number would you like to be contacted at? [phone]    How would you like to obtain your AVS? Reedhart    Subjective     Karla Gordon is a 29 year old female who presents via phone visit today for the following health issues:    HPI  Patient would like to request Lorazepam prescription. See [employer] message sent by pt today.  Has a history of alcohol abuse and had a slip up withdrawal symptoms  Helping about it   4 days .  1 bottle a day, hard liquor  Sponsor aware of what's happened                  Patient Active Problem List   Diagnosis     Numbness and tingling of foot     Tinnitus, bilateral     Plugged feeling in ear, bilateral     Tobacco use disorder     Thrombocytopenia (H)     Fatty liver     Macrocytic anemia     Alcohol use disorder, moderate, dependence (H)     Dietary folate deficiency anemia     Alcoholic " hepatitis with ascites     Liver lesion     Lung nodule     Lymphadenopathy     Mild mitral regurgitation by prior echocardiogram     Cervical high risk HPV (human papillomavirus) test positive     Gestational diabetes     Past Surgical History:   Procedure Laterality Date     ABDOMINAL PARACENTESIS  01/24/2019    US Paracentesis with Imaging at [facility].  Fluid removed: 5910 mL     C ANESTH,SHOULDER REPLACEMENT Right 2015    right partial shoulder replacement at age 25     HC TOOTH EXTRACTION W/FORCEP  2015     ORTHOPEDIC SURGERY         Social History     Tobacco Use     Smoking status: Current Every Day Smoker     Packs/day: 0.50     Years: 7.00     Pack years: 3.50     Types: Cigarettes     Smokeless tobacco: Never Used   Substance Use Topics     Alcohol use: Not Currently     Comment: daily, few drinks a day, not during pregnancy     Family History   Problem Relation Age of Onset     Cancer Mother 62        bladder cancer     Hypertension Mother      Psoriasis Father      No Known Problems Brother      No Known Problems Sister      No Known Problems Son      Coronary Artery Disease Paternal Grandmother      Coronary Artery Disease Paternal Grandfather      Diabetes No family hx of      Hyperlipidemia No family hx of      Breast Cancer No family hx of      Colon Cancer No family hx of          Current Outpatient Medications   Medication Sig Dispense Refill     acetaminophen (TYLENOL) 325 MG tablet Take 2 tablets (650 mg) by mouth every 6 hours as needed for mild pain Start after Delivery. 100 tablet 0     BIOTIN PO Take 1 tablet by mouth daily       diphenhydrAMINE-acetaminophen (TYLENOL PM)  MG tablet Take 1 tablet by mouth nightly as needed for sleep       ferrous sulfate (FE TABS) 325 (65 Fe) MG EC tablet Take 1 tablet (325 mg) by mouth daily 60 tablet 1     FOLIC ACID PO Take 1 mg by mouth daily       hydrOXYzine (ATARAX) 25 MG tablet Take 1 tablet (25 mg) by mouth 2 times daily as needed for anxiety  (sleep) May also take at bedtime as needed for sleep 30 tablet 3     LORazepam (ATIVAN) 1 MG tablet Take 1 tablet (1 mg) by mouth every 8 hours as needed for anxiety or withdrawal 10 tablet 0     Multiple Vitamins-Minerals (CENTRUM ULTRA WOMENS PO) Take 1 tablet by mouth daily       norelgestromin-ethinyl estradiol (ORTHO EVRA) 150-35 MCG/24HR patch Remove old patch and apply new patch onto the skin once a week for 3 weeks (21 days). Do not wear patch week 4 (days 22-28), then repeat. 9 patch 3     sertraline (ZOLOFT) 50 MG tablet Take 1 tablet (50 mg) by mouth daily 30 tablet 5     Prenatal Vit-Fe Fumarate-FA (PRENATAL VITAMIN PO)        sertraline (ZOLOFT) 25 MG tablet Take 1.5 tablets (37.5 mg) by mouth daily (Patient not taking: Reported on 7/10/2020) 30 tablet 3     Allergies   Allergen Reactions     Lexapro [Escitalopram] Nausea     Severe nausea; does not wish to take again     Spinach      Mouth gets numb     Recent Labs   Lab Test 12/29/19  1453 08/20/19  1644 12/25/18  2047  03/21/18  0942   LDL  --   --   --   --  76   HDL  --   --   --   --  94   TRIG  --   --   --   --  206*   ALT 21 19 108*   < > 76*   CR  --  0.41* 0.39*   < > 0.53   GFRESTIMATED  --  >90 >90   < > >90   GFRESTBLACK  --  >90 >90   < > >90   POTASSIUM  --  4.2 4.0   < > 4.6   TSH  --   --   --   --  1.51    < > = values in this interval not displayed.      BP Readings from Last 3 Encounters:   02/24/20 119/75   02/17/20 122/70   02/11/20 117/78    Wt Readings from Last 3 Encounters:   02/24/20 93.1 kg (205 lb 3.2 oz)   02/17/20 93.7 kg (206 lb 9.6 oz)   02/11/20 92.9 kg (204 lb 12.8 oz)                    Reviewed and updated as needed this visit by Provider         Review of Systems   Constitutional, HEENT, cardiovascular, pulmonary, gi and gu systems are negative, except as otherwise noted.       Objective   Reported vitals:  There were no vitals taken for this visit.   healthy, alert and no distress  PSYCH: Alert and oriented times  3; coherent speech, normal   rate and volume, able to articulate logical thoughts, able   to abstract reason, no tangential thoughts, no hallucinations   or delusions  Her affect is normal  RESP: No cough, no audible wheezing, able to talk in full sentences  Remainder of exam unable to be completed due to telephone visits    Diagnostic Test Results:  Labs reviewed in Epic  No results found for any visits on 07/10/20.        Assessment/Plan:  1. Alcohol dependence with withdrawal with complication (H)    - LORazepam (ATIVAN) 1 MG tablet; Take 1 tablet (1 mg) by mouth every 8 hours as needed for anxiety or withdrawal  Dispense: 10 tablet; Refill: 0    No follow-ups on file.    Patient with 4 days recurrence of heavy drinking   overall risk of withdrawal is very low, however, did have dangerous withdrawal in the past.  OK for lorazepam over the weekend  Has AA sponsor and family member to monitor progress       Phone call duration:  15 minutes    Calin Mccabe MD

## 2020-07-14 DIAGNOSIS — H53.40 VISUAL FIELD DEFECT: Primary | ICD-10-CM

## 2020-07-22 ENCOUNTER — MYC MEDICAL ADVICE (OUTPATIENT)
Dept: FAMILY MEDICINE | Facility: CLINIC | Age: 30
End: 2020-07-22

## 2020-07-24 DIAGNOSIS — F10.239 ALCOHOL DEPENDENCE WITH WITHDRAWAL WITH COMPLICATION (H): ICD-10-CM

## 2020-07-27 NOTE — TELEPHONE ENCOUNTER
Routing refill request to provider for review/approval because:  Drug not on the [facility] refill protocol     Leena Rosenbaum RN, BSN, PHN  [facility]: [place]

## 2020-07-28 RX ORDER — LORAZEPAM 1 MG/1
1 TABLET ORAL EVERY 8 HOURS PRN
Qty: 10 TABLET | Refills: 0 | OUTPATIENT
Start: 2020-07-28

## 2020-07-28 NOTE — TELEPHONE ENCOUNTER
Rx refused.  Was sent by Dr. Mccabe on 7/10, intended to be a short, one time supply.  Patient has follow up scheduled on 7/31

## 2020-08-06 DIAGNOSIS — F10.239 ALCOHOL DEPENDENCE WITH WITHDRAWAL WITH COMPLICATION (H): ICD-10-CM

## 2020-08-07 RX ORDER — LORAZEPAM 1 MG/1
1 TABLET ORAL EVERY 8 HOURS PRN
Qty: 10 TABLET | Refills: 0 | Status: SHIPPED | OUTPATIENT
Start: 2020-08-07 | End: 2021-01-19

## 2020-08-07 NOTE — TELEPHONE ENCOUNTER
Requested Prescriptions   Pending Prescriptions Disp Refills    LORazepam (ATIVAN) 1 MG tablet 10 tablet 0     Sig: Take 1 tablet (1 mg) by mouth every 8 hours as needed for anxiety or withdrawal       There is no refill protocol information for this order        Routing refill request to provider for review/approval because:  Drug not on the [facility] refill protocol   Rossy Olivo RN,BSN  [facility]

## 2020-10-19 DIAGNOSIS — F41.1 GENERALIZED ANXIETY DISORDER: ICD-10-CM

## 2020-11-16 ENCOUNTER — ALLIED HEALTH/NURSE VISIT (OUTPATIENT)
Dept: NURSING | Facility: CLINIC | Age: 30
End: 2020-11-16
Payer: COMMERCIAL

## 2020-11-16 DIAGNOSIS — Z23 NEED FOR PROPHYLACTIC VACCINATION AND INOCULATION AGAINST INFLUENZA: Primary | ICD-10-CM

## 2020-11-16 PROCEDURE — 90471 IMMUNIZATION ADMIN: CPT

## 2020-11-16 PROCEDURE — 99207 PR NO CHARGE NURSE ONLY: CPT

## 2020-11-16 PROCEDURE — 90686 IIV4 VACC NO PRSV 0.5 ML IM: CPT

## 2020-12-07 ENCOUNTER — OFFICE VISIT (OUTPATIENT)
Dept: FAMILY MEDICINE | Facility: CLINIC | Age: 30
End: 2020-12-07
Payer: COMMERCIAL

## 2020-12-07 VITALS
HEIGHT: 66 IN | SYSTOLIC BLOOD PRESSURE: 124 MMHG | DIASTOLIC BLOOD PRESSURE: 66 MMHG | HEART RATE: 78 BPM | OXYGEN SATURATION: 96 % | WEIGHT: 227.4 LBS | BODY MASS INDEX: 36.55 KG/M2 | RESPIRATION RATE: 16 BRPM

## 2020-12-07 DIAGNOSIS — R91.1 LUNG NODULE: ICD-10-CM

## 2020-12-07 DIAGNOSIS — Z12.4 SCREENING FOR MALIGNANT NEOPLASM OF CERVIX: Primary | ICD-10-CM

## 2020-12-07 DIAGNOSIS — D52.0 DIETARY FOLATE DEFICIENCY ANEMIA: ICD-10-CM

## 2020-12-07 DIAGNOSIS — D53.9 MACROCYTIC ANEMIA: ICD-10-CM

## 2020-12-07 DIAGNOSIS — K76.9 LIVER LESION: ICD-10-CM

## 2020-12-07 DIAGNOSIS — R59.1 LYMPHADENOPATHY: ICD-10-CM

## 2020-12-07 LAB
BASOPHILS # BLD AUTO: 0 10E9/L (ref 0–0.2)
BASOPHILS NFR BLD AUTO: 0.1 %
DIFFERENTIAL METHOD BLD: ABNORMAL
EOSINOPHIL # BLD AUTO: 0.3 10E9/L (ref 0–0.7)
EOSINOPHIL NFR BLD AUTO: 1.9 %
ERYTHROCYTE [DISTWIDTH] IN BLOOD BY AUTOMATED COUNT: 14.1 % (ref 10–15)
FOLATE SERPL-MCNC: 70.5 NG/ML
HCT VFR BLD AUTO: 34.5 % (ref 35–47)
HGB BLD-MCNC: 11.1 G/DL (ref 11.7–15.7)
LYMPHOCYTES # BLD AUTO: 2.3 10E9/L (ref 0.8–5.3)
LYMPHOCYTES NFR BLD AUTO: 17.5 %
MCH RBC QN AUTO: 30.5 PG (ref 26.5–33)
MCHC RBC AUTO-ENTMCNC: 32.2 G/DL (ref 31.5–36.5)
MCV RBC AUTO: 95 FL (ref 78–100)
MONOCYTES # BLD AUTO: 0.4 10E9/L (ref 0–1.3)
MONOCYTES NFR BLD AUTO: 3.2 %
NEUTROPHILS # BLD AUTO: 10.3 10E9/L (ref 1.6–8.3)
NEUTROPHILS NFR BLD AUTO: 77.3 %
PLATELET # BLD AUTO: 251 10E9/L (ref 150–450)
RBC # BLD AUTO: 3.64 10E12/L (ref 3.8–5.2)
WBC # BLD AUTO: 13.3 10E9/L (ref 4–11)

## 2020-12-07 PROCEDURE — 87624 HPV HI-RISK TYP POOLED RSLT: CPT | Performed by: NURSE PRACTITIONER

## 2020-12-07 PROCEDURE — G0145 SCR C/V CYTO,THINLAYER,RESCR: HCPCS | Performed by: NURSE PRACTITIONER

## 2020-12-07 PROCEDURE — 85025 COMPLETE CBC W/AUTO DIFF WBC: CPT | Performed by: NURSE PRACTITIONER

## 2020-12-07 PROCEDURE — 82728 ASSAY OF FERRITIN: CPT | Performed by: NURSE PRACTITIONER

## 2020-12-07 PROCEDURE — 82746 ASSAY OF FOLIC ACID SERUM: CPT | Performed by: NURSE PRACTITIONER

## 2020-12-07 PROCEDURE — 36415 COLL VENOUS BLD VENIPUNCTURE: CPT | Performed by: NURSE PRACTITIONER

## 2020-12-07 PROCEDURE — 99213 OFFICE O/P EST LOW 20 MIN: CPT | Performed by: NURSE PRACTITIONER

## 2020-12-07 ASSESSMENT — MIFFLIN-ST. JEOR: SCORE: 1758.23

## 2020-12-07 NOTE — PROGRESS NOTES
"Subjective     Karla Gordon is a 30 year old female who presents to clinic today for the following health issues:    HPI         Pap smear and HPV screen    Bonnie Tapia, medical assistant    Patient is here to follow-up and get a repeat Pap smear due to history of abnormal Pap 6/25/2019 that showed NIL pap + HR HPV.  She was due for her repeat Pap 6/2020 but has not presented to get this done until today.  She uses the contraceptive patch.    She has history of microcytic anemia and folate deficiency.  She is willing to get labs rechecked today to monitor this.    She also has history of lung nodule, a liver lesion, and mediastinal/hilar lymphadenopathy for which she is due for follow-up.    States she is currently alcohol free.    Review of Systems   Constitutional, HEENT, cardiovascular, pulmonary, gi and gu systems are negative, except as otherwise noted.      Objective    /66 (BP Location: Right arm)   Pulse 78   Resp 16   Ht 1.676 m (5' 6\")   Wt 103.1 kg (227 lb 6.4 oz)   SpO2 96%   BMI 36.70 kg/m    Body mass index is 36.7 kg/m .  Physical Exam   GENERAL: healthy, alert, no distress and obese  RESP: lungs clear to auscultation - no rales, rhonchi or wheezes  CV: regular rates and rhythm, normal S1 S2, no S3 or S4 and no murmur, click or rub   (female): normal female external genitalia, normal urethral meatus, vaginal mucosa, normal cervix/adnexa/uterus without masses or discharge  PSYCH: mentation appears normal, affect normal/bright, judgement and insight intact and appearance well groomed        CT CHEST, ABDOMEN AND PELVIS WITHOUT CONTRAST  2/19/2020 3:45 PM     TECHNIQUE: CT scan obtained of the chest, abdomen, and pelvis without  IV contrast. Radiation dose for this scan was reduced using automated  exposure control, adjustment of the mA and/or kV according to patient  size, or iterative reconstruction technique.     COMPARISON:  Outside CT abdomen and pelvis on " 1/3/2019.     FINDINGS:  Chest/mediastinum: No cardiomegaly. Trace pericardial effusion.  Multiple mildly enlarged mediastinal and hilar lymph nodes including  for example 1.0 cm short axis right pretracheal lymph node (series 3  image 97) and 0.9 cm short axis right hilar lymph node (series 3 image  128).     Lung/pleura: No pleural effusion or pneumothorax. Mild upper lobe  predominant emphysematous changes. There is a 7 mm nodule along the  right minor fissure (series 4 image 162), not significantly changed as  compared to 1/13/2019, likely represents an intrafissural lymph node.  A few other pulmonary nodules, for example 2 mm nodule in the right  upper lobe (series 4 image 88).     Abdomen/pelvis: Mild surface nodularity of the liver. The previously  seen hypoattenuating lesion in the right hepatic lobe near the dome is  not well seen on this noncontrast exam. The gallbladder is  unremarkable. No splenomegaly. Small splenules along the anterior  aspect of the spleen. No adrenal nodules. No evidence of kidney stones  or hydronephrosis. There is a 3.9 cm right adnexal hypoattenuating  cyst (series 3 image 532), likely physiological. No significant free  fluid in the abdomen or pelvis. No free peritoneal portal or venous  gas. The appendix is visualized and appears normal. Multiple prominent  retroperitoneal lymph nodes but not enlarged by size criteria, not  significantly changed as compared to 1/13/2019 exam.     Bones and soft tissue: No suspicious osseous lesion.                                                                 IMPRESSION:   1. There is 7 mm nodule along the right minor fissure, likely  intrafissural lymph nodes, not significantly changed as compared to  1/13/2019 exam. In addition there are a few small pulmonary nodules  including 2 mm nodule in the right upper lobe (no priors to compare  to); as per Fleischner society criteria for low risk patient no  routine follow-up is recommended and for  "high-risk patient optional  chest CT in 12 months can be considered.   2. Multiple mildly enlarged mediastinal and hilar lymph nodes, for  example 1.0 cm short axis right pretracheal lymph node, indeterminate,  likely reactive.  3. Multiple prominent but not enlarged retroperitoneal lymph nodes,  not significantly changed as compared to 1/13/2019 exam, likely  reactive.  4. The previously seen indeterminate right hepatic lobe lesion is not  well seen on this noncontrast exam.  5. 3.9 cm right adnexal cyst, could be physiological, can be further  evaluated with pelvic ultrasound if clinically warranted.     TOMMIE LEWIS MD    Assessment & Plan     Screening for malignant neoplasm of cervix    - HPV High Risk Types DNA Cervical  - Pap imaged thin layer screen with HPV - recommended age 30 - 65 years (select HPV order below)    Macrocytic anemia    - CBC with platelets and differential  - Ferritin    Dietary folate deficiency anemia    - Folate    Lung nodule    - CT Chest Abdomen w/o & w Contrast; Future    Liver lesion    - CT Chest Abdomen w/o & w Contrast; Future    Lymphadenopathy    - CT Chest Abdomen w/o & w Contrast; Future    Follow up CT ordered and patient will call to schedule; states she likely will not get until after the holidays.     BMI:   Estimated body mass index is 36.7 kg/m  as calculated from the following:    Height as of this encounter: 1.676 m (5' 6\").    Weight as of this encounter: 103.1 kg (227 lb 6.4 oz).            Return in about 6 months (around 6/7/2021) for Routine Visit.    Millie Moore NP  [facility]    "

## 2020-12-07 NOTE — PATIENT INSTRUCTIONS
[facility]     Discharged by : Gale Tapia CMA    If you have any questions regarding your visit please contact your care team:     Team Sherry              Clinic Hours Telephone Number     Dr. Wilfredo Moore, CNP   7am-7pm  Monday - Thursday   7am-5pm  Fridays  [phone]   (Appointment scheduling available 24/7)     RN Line  [phone] option 2     Urgent Care - [place] and [place] [facility] [facility] - 11am-9pm Monday-Friday Saturday-Sunday- 9am-5pm     [place] -   5pm-9pm Monday-Friday Saturday-Sunday- 9am-5pm    [phone] - [facility] [place]    [phone] - [place]     For a Price Quote for your services, please call our [employer] Price Line at [phone].     What options do I have for visits at the clinic other than the traditional office visit?     To expand how we care for you, many of our providers are utilizing electronic visits (e-visits) and telephone visits, when medically appropriate, for interactions with their patients rather than a visit in the clinic. We also offer nurse visits for many medical concerns. Just like any other service, we will bill your insurance company for this type of visit based on time spent on the phone with your provider. Not all insurance companies cover these visits. Please check with your medical insurance if this type of visit is covered. You will be responsible for any charges that are not paid by your insurance.     E-visits via [employer]: generally incur a $45.00 fee.     Telephone visits:  Time spent on the phone: *charged based on time that is spent on the phone in increments of 10 minutes. Estimated cost:   5-10 mins $30.00   11-20 mins. $59.00   21-30 mins. $85.00       Use WhoisEDIt (secure email communication and access to your chart) to send your primary care provider a message or make an appointment. Ask someone on your Team how to sign up for WhoisEDIt.     As always, Thank you for trusting  us with your health care needs!      [facility]:    Scheduling Appointments  [facility]  Call: [phone]    [facility]  Call: [phone]    [facility]  Call: [phone]    For Gastroenterology referrals   [facility]   Clinics and Surgery Center, 4th Floor   [address] [place] [address]   Appointments: [phone]    WHERE TO GO FOR CARE?    Clinic    Make an appointment if you:       Are sick (cold, cough, flu, sore throat, earache or in pain).       Have a small injury (sprain, small cut, burn or broken bone).       Need a physical exam, Pap smear, vaccine or prescription refill.       Have questions about your health or medicines.    To reach us:      Call [phone] ([phone]). Open 24 hours every day. (For counseling services, call [phone].)    Log into [employer] at [employer]. (Visit [email or web address] to create an account.) Hospital emergency room    An emergency is a serious or life- threatening problem that must be treated right away.    Call 995 or get to the hospital if you have:      Very bad or sudden:            - Chest pain or pressure         - Bleeding         - Head or belly pain         - Dizziness or trouble seeing, walking or                          Speaking      Problems breathing      Blood in your vomit or you are coughing up blood      A major injury (knocked out, loss of a finger or limb, rape, broken bone protruding from skin)    A mental health crisis. (Or call the Mental Health Crisis line at 1-353.134.7315 or Suicide Prevention Hotline at 1-689.651.3039.)    Open 24 hours every day. You don't need an appointment.     Urgent care    Visit urgent care for sickness or small injuries when the clinic is closed. You don't need an appointment. To check hours or find an urgent care near you, visit [email or web address]. Online care    Get  online care from [facility] for more than 70 common problems, like colds, allergies and infections. Open 24 hours every day at:   [email or web address]   Need help deciding?    For advice about where to be seen, you may call your clinic and ask to speak with a nurse. We're here for you 24 hours every day.         If you are deaf or hard of hearing, please let us know. We provide many free services including sign language interpreters, oral interpreters, TTYs, telephone amplifiers, note takers and written materials.

## 2020-12-08 LAB — FERRITIN SERPL-MCNC: 91 NG/ML (ref 12–150)

## 2020-12-09 LAB
COPATH REPORT: NORMAL
PAP: NORMAL

## 2020-12-11 LAB
FINAL DIAGNOSIS: NORMAL
HPV HR 12 DNA CVX QL NAA+PROBE: NEGATIVE
HPV16 DNA SPEC QL NAA+PROBE: NEGATIVE
HPV18 DNA SPEC QL NAA+PROBE: NEGATIVE
SPECIMEN DESCRIPTION: NORMAL
SPECIMEN SOURCE CVX/VAG CYTO: NORMAL

## 2020-12-14 ENCOUNTER — PATIENT OUTREACH (OUTPATIENT)
Dept: FAMILY MEDICINE | Facility: CLINIC | Age: 30
End: 2020-12-14

## 2020-12-14 NOTE — TELEPHONE ENCOUNTER
6/25/19: NIL Pap, + HR HPV (not 16 or 18) result. Plan cotest in 1 year.   7/2/19 Pt advised.  11/2/20 Reminder MyChart  12/7/20 NIL pap, Neg HPV. Plan cotest in 1 year due bef 12/7/21.

## 2021-01-10 NOTE — PROGRESS NOTES
"Please see \"Imaging\" tab under \"Chart Review\" for details of today's US.    Nathalie Weinstein, DO    "
no

## 2021-01-19 ENCOUNTER — OFFICE VISIT (OUTPATIENT)
Dept: FAMILY MEDICINE | Facility: CLINIC | Age: 31
End: 2021-01-19
Payer: COMMERCIAL

## 2021-01-19 VITALS
TEMPERATURE: 97.8 F | BODY MASS INDEX: 35.02 KG/M2 | WEIGHT: 217 LBS | OXYGEN SATURATION: 97 % | HEART RATE: 113 BPM | DIASTOLIC BLOOD PRESSURE: 82 MMHG | SYSTOLIC BLOOD PRESSURE: 122 MMHG

## 2021-01-19 DIAGNOSIS — M76.51 PATELLAR TENDINITIS OF RIGHT KNEE: Primary | ICD-10-CM

## 2021-01-19 PROCEDURE — 99213 OFFICE O/P EST LOW 20 MIN: CPT | Performed by: FAMILY MEDICINE

## 2021-01-19 ASSESSMENT — PAIN SCALES - GENERAL: PAINLEVEL: NO PAIN (0)

## 2021-01-19 NOTE — PATIENT INSTRUCTIONS
Motrin 600-800 mg (3-4 tabs) three times a day with food for 7 days     Due execises above     Let me know if not better 2-3 weeks    Carline Blas D.O.

## 2021-01-19 NOTE — PROGRESS NOTES
Assessment & Plan       ICD-10-CM    1. Patellar tendinitis of right knee  M76.51      The patient was given exercises to do for her patellar tendinitis.  She was also instructed to take 6 to 800 mg ibuprofen 3 times a day for a week.  She should see vast improvement in the next 2 to 3 weeks.  If she does not I would refer her to physical therapy    20 minutes spent on the date of the encounter doing chart review, patient visit and documentation          Tobacco Cessation:   reports that she has been smoking cigarettes. She has a 3.50 pack-year smoking history. She has never used smokeless tobacco.  Tobacco Cessation Action Plan: Information offered: Patient not interested at this time          No follow-ups on file.    Carline Blas DO  [facility]    Denise Acosta is a 30 year old who presents to clinic today for the following health issues  accompanied by her :    HPI   Musculoskeletal problem/pain  Onset/Duration: 2 months   Description  Location: knee - right  Joint Swelling: no  Redness: no  Pain: YES- when patient puts pressure on it   Warmth: no  Intensity:  moderate  Progression of Symptoms:  same  Accompanying signs and symptoms:   Fevers: no  Numbness/tingling/weakness: no  History  Trauma to the area: YES  Recent illness:  no  Previous similar problem: no  Previous evaluation:  no  Precipitating or alleviating factors:  Aggravating factors include: sitting on the floor playing with kids, kneeling, crawling into bed the wrong way   Therapies tried and outcome: acetaminophen      Review of Systems   Constitutional, HEENT, cardiovascular, pulmonary, gi and gu systems are negative, except as otherwise noted.      Objective    /82 (BP Location: Right arm, Patient Position: Sitting, Cuff Size: Adult Large)   Pulse 113   Temp 97.8  F (36.6  C) (Oral)   Wt 98.4 kg (217 lb)   SpO2 97%   BMI 35.02 kg/m    Body mass index is 35.02 kg/m .  Physical Exam   GENERAL:  healthy, alert and no distress  MS:  knees bilaterally full painless ROM, slight joint line tenderness, no tenderness over collateral ligaments, negative anterior and posterior drawer tests, full stability lateral and medial stress, hips full painless ROM, tenderness over the inferior patella and insertion of patellar tendon onto the patella    SKIN: no suspicious lesions or rashes  NEURO: Normal strength and tone, mentation intact and speech normal  PSYCH: mentation appears normal, affect normal/bright

## 2021-01-20 PROBLEM — R87.810 CERVICAL HIGH RISK HPV (HUMAN PAPILLOMAVIRUS) TEST POSITIVE: Status: ACTIVE | Noted: 2019-06-25

## 2021-02-18 ENCOUNTER — MYC MEDICAL ADVICE (OUTPATIENT)
Dept: FAMILY MEDICINE | Facility: CLINIC | Age: 31
End: 2021-02-18

## 2021-04-14 ENCOUNTER — OFFICE VISIT (OUTPATIENT)
Dept: FAMILY MEDICINE | Facility: CLINIC | Age: 31
End: 2021-04-14
Payer: COMMERCIAL

## 2021-04-14 VITALS
SYSTOLIC BLOOD PRESSURE: 106 MMHG | TEMPERATURE: 98.1 F | HEART RATE: 95 BPM | BODY MASS INDEX: 35.55 KG/M2 | WEIGHT: 221.2 LBS | OXYGEN SATURATION: 97 % | DIASTOLIC BLOOD PRESSURE: 66 MMHG | HEIGHT: 66 IN | RESPIRATION RATE: 22 BRPM

## 2021-04-14 DIAGNOSIS — M54.50 BILATERAL LOW BACK PAIN WITHOUT SCIATICA, UNSPECIFIED CHRONICITY: ICD-10-CM

## 2021-04-14 DIAGNOSIS — K76.9 LIVER LESION: ICD-10-CM

## 2021-04-14 DIAGNOSIS — R91.1 LUNG NODULE: ICD-10-CM

## 2021-04-14 DIAGNOSIS — R10.9 BILATERAL FLANK PAIN: Primary | ICD-10-CM

## 2021-04-14 DIAGNOSIS — R59.1 LYMPHADENOPATHY: ICD-10-CM

## 2021-04-14 PROCEDURE — 99214 OFFICE O/P EST MOD 30 MIN: CPT | Performed by: NURSE PRACTITIONER

## 2021-04-14 RX ORDER — CYCLOBENZAPRINE HCL 5 MG
5 TABLET ORAL 3 TIMES DAILY PRN
Qty: 20 TABLET | Refills: 0 | Status: SHIPPED | OUTPATIENT
Start: 2021-04-14 | End: 2022-05-02

## 2021-04-14 ASSESSMENT — ANXIETY QUESTIONNAIRES
1. FEELING NERVOUS, ANXIOUS, OR ON EDGE: SEVERAL DAYS
3. WORRYING TOO MUCH ABOUT DIFFERENT THINGS: SEVERAL DAYS
6. BECOMING EASILY ANNOYED OR IRRITABLE: NOT AT ALL
GAD7 TOTAL SCORE: 2
5. BEING SO RESTLESS THAT IT IS HARD TO SIT STILL: NOT AT ALL
2. NOT BEING ABLE TO STOP OR CONTROL WORRYING: NOT AT ALL
IF YOU CHECKED OFF ANY PROBLEMS ON THIS QUESTIONNAIRE, HOW DIFFICULT HAVE THESE PROBLEMS MADE IT FOR YOU TO DO YOUR WORK, TAKE CARE OF THINGS AT HOME, OR GET ALONG WITH OTHER PEOPLE: NOT DIFFICULT AT ALL
7. FEELING AFRAID AS IF SOMETHING AWFUL MIGHT HAPPEN: NOT AT ALL

## 2021-04-14 ASSESSMENT — PAIN SCALES - GENERAL: PAINLEVEL: NO PAIN (0)

## 2021-04-14 ASSESSMENT — PATIENT HEALTH QUESTIONNAIRE - PHQ9
SUM OF ALL RESPONSES TO PHQ QUESTIONS 1-9: 2
5. POOR APPETITE OR OVEREATING: NOT AT ALL

## 2021-04-14 ASSESSMENT — MIFFLIN-ST. JEOR: SCORE: 1730.11

## 2021-04-14 NOTE — PROGRESS NOTES
"    Assessment & Plan     Bilateral flank pain    - CT Abdomen Pelvis w/o Contrast; Future  - UA with Microscopic reflex to Culture; Future    Bilateral low back pain without sciatica, unspecified chronicity    - cyclobenzaprine (FLEXERIL) 5 MG tablet; Take 1 tablet (5 mg) by mouth 3 times daily as needed for muscle spasms    Liver lesion    - CT Abdomen Pelvis w/o Contrast; Future    Lung nodule    - CT Abdomen Pelvis w/o Contrast; Future    Lymphadenopathy    - CT Abdomen Pelvis w/o Contrast; Future    Atypical presentation of back pain today, could be kidney stones and imaging recommended for evaluation.  Patient may trial muscle relaxant to see if this improves her symptoms.  She also is in need of CT for follow up of liver lesion, lung nodule, and lymphadenopathy.   scheduled patient for CT on 4/19/21 and patient is aware.             BMI:   Estimated body mass index is 35.7 kg/m  as calculated from the following:    Height as of this encounter: 1.676 m (5' 6\").    Weight as of this encounter: 100.3 kg (221 lb 3.2 oz).         Return in about 5 days (around 4/19/2021) for abd/pelvic CT as scheduled.    Millie Moore NP  [facility]    Denise Acosta is a 30 year old who presents for the following health issues     HPI     Back Pain  Onset/Duration: 1 month  Description:   Location of pain: middle of back both  Character of pain: gnawing, \"like a Laser\"  Pain radiation: none  New numbness or weakness in legs, not attributed to pain: no   Intensity: At its worst 9/10, moderate, severe  Progression of Symptoms: same and intermittent  History:   Have to lye down, and wait for it to go away,   Specific cause: none  Pain interferes with job: YES  History of back problems: no prior back problems  Any previous MRI or X-rays: None  Sees a specialist for back pain: No  Alleviating factors:   Improved by: icy hot gel, , acetaminophen (Tylenol) and NSAIDs /Ibu,. Not " "much relief    Precipitating factors:  Worsened by: it doesn't not matter, can happen any time of day.  The pain has even woken her up at night.  Therapies tried and outcome: icy hot gel , acetaminophen (Tylenol) and NSAIDs- no relief     Accompanying Signs & Symptoms:  Risk of Fracture: None  Risk of Cauda Equina: None  Risk of Infection: None  Risk of Cancer: None  Risk of Ankylosing Spondylitis: Onset at age <35, male, AND morning back stiffness  no     She denies urinary symptoms of dysuria, hematuria, frequency.  Denies constipation, did have some loose stools.  No vomiting.    Patient's [employer] message from 4/1/21 coped here:  \"I've been having a really intense, intermittent pain in the entire middle of my back for almost two weeks now. I haven't done anything to pull a muscle and I can't think of anything I'm doing that would aggravate my back so much. The pain is so intense it makes me nauseous, shaky, and sweaty and it typically lasts between 15 minutes to an hour. I've tried stretching and some yoga but nothing seems to be helping, including tylenol and ibuprofen. I don't know what's going on but I'd love any insight or advice because I'm at a loss.\"    At last visit with me 12/7/2020 I recommended a follow-up CT for  liver lesion, mediastinal and hilar lymphadenopathy, retroperitoneal lymphadenopathy, lung nodue  But she never followed up to get the CT    Review of Systems   Constitutional, HEENT, cardiovascular, pulmonary, musculoskeletal, neurologici, gi and gu systems are negative, except as otherwise noted.      Objective    /66 (BP Location: Right arm)   Pulse 95   Temp 98.1  F (36.7  C) (Oral)   Resp 22   Ht 1.676 m (5' 6\")   Wt 100.3 kg (221 lb 3.2 oz)   SpO2 97%   BMI 35.70 kg/m    Body mass index is 35.7 kg/m .  Physical Exam   GENERAL: healthy, alert and no distress  BACK: no CVA tenderness, no paralumbar tenderness  Back-   No tenderness and Range of motion not limited by pain "   MS:  Full ROM bilateral hips  PSYCH: mentation appears normal, affect normal/bright

## 2021-04-15 ASSESSMENT — ANXIETY QUESTIONNAIRES: GAD7 TOTAL SCORE: 2

## 2021-04-16 DIAGNOSIS — R10.9 BILATERAL FLANK PAIN: ICD-10-CM

## 2021-04-16 LAB
ALBUMIN UR-MCNC: NEGATIVE MG/DL
APPEARANCE UR: CLEAR
BACTERIA #/AREA URNS HPF: ABNORMAL /HPF
BILIRUB UR QL STRIP: NEGATIVE
COLOR UR AUTO: YELLOW
GLUCOSE UR STRIP-MCNC: NEGATIVE MG/DL
HGB UR QL STRIP: ABNORMAL
KETONES UR STRIP-MCNC: NEGATIVE MG/DL
LEUKOCYTE ESTERASE UR QL STRIP: ABNORMAL
NITRATE UR QL: NEGATIVE
NON-SQ EPI CELLS #/AREA URNS LPF: ABNORMAL /LPF
PH UR STRIP: 6 PH (ref 5–7)
RBC #/AREA URNS AUTO: ABNORMAL /HPF
SOURCE: ABNORMAL
SP GR UR STRIP: 1.01 (ref 1–1.03)
UROBILINOGEN UR STRIP-ACNC: 0.2 EU/DL (ref 0.2–1)
WBC #/AREA URNS AUTO: ABNORMAL /HPF

## 2021-04-16 PROCEDURE — 81001 URINALYSIS AUTO W/SCOPE: CPT | Performed by: NURSE PRACTITIONER

## 2021-04-19 ENCOUNTER — ANCILLARY PROCEDURE (OUTPATIENT)
Dept: CT IMAGING | Facility: CLINIC | Age: 31
End: 2021-04-19
Attending: NURSE PRACTITIONER
Payer: COMMERCIAL

## 2021-04-19 DIAGNOSIS — R10.9 BILATERAL FLANK PAIN: ICD-10-CM

## 2021-04-19 DIAGNOSIS — R59.1 LYMPHADENOPATHY: ICD-10-CM

## 2021-04-19 DIAGNOSIS — K76.9 LIVER LESION: ICD-10-CM

## 2021-04-19 DIAGNOSIS — R91.1 LUNG NODULE: ICD-10-CM

## 2021-04-19 PROCEDURE — 74176 CT ABD & PELVIS W/O CONTRAST: CPT | Mod: TC | Performed by: RADIOLOGY

## 2021-04-25 ENCOUNTER — HEALTH MAINTENANCE LETTER (OUTPATIENT)
Age: 31
End: 2021-04-25

## 2021-04-30 DIAGNOSIS — R59.0 HILAR LYMPHADENOPATHY: ICD-10-CM

## 2021-04-30 DIAGNOSIS — R59.0 MEDIASTINAL LYMPHADENOPATHY: Primary | ICD-10-CM

## 2021-04-30 DIAGNOSIS — K76.9 LIVER LESION: ICD-10-CM

## 2021-04-30 DIAGNOSIS — R91.1 LUNG NODULE: ICD-10-CM

## 2021-05-25 ENCOUNTER — RECORDS - HEALTHEAST (OUTPATIENT)
Dept: ADMINISTRATIVE | Facility: CLINIC | Age: 31
End: 2021-05-25

## 2021-05-26 ENCOUNTER — RECORDS - HEALTHEAST (OUTPATIENT)
Dept: ADMINISTRATIVE | Facility: CLINIC | Age: 31
End: 2021-05-26

## 2021-08-24 ENCOUNTER — OFFICE VISIT (OUTPATIENT)
Dept: FAMILY MEDICINE | Facility: CLINIC | Age: 31
End: 2021-08-24
Payer: COMMERCIAL

## 2021-08-24 VITALS
DIASTOLIC BLOOD PRESSURE: 60 MMHG | SYSTOLIC BLOOD PRESSURE: 128 MMHG | BODY MASS INDEX: 35.42 KG/M2 | TEMPERATURE: 98.3 F | HEIGHT: 66 IN | OXYGEN SATURATION: 99 % | WEIGHT: 220.4 LBS | HEART RATE: 103 BPM | RESPIRATION RATE: 25 BRPM

## 2021-08-24 DIAGNOSIS — F10.220 ALCOHOL DEPENDENCE WITH UNCOMPLICATED INTOXICATION (H): ICD-10-CM

## 2021-08-24 DIAGNOSIS — L60.0 INGROWING TOENAIL: Primary | ICD-10-CM

## 2021-08-24 DIAGNOSIS — D69.6 THROMBOCYTOPENIA (H): ICD-10-CM

## 2021-08-24 DIAGNOSIS — K70.9 LIVER DISEASE, CHRONIC, DUE TO ALCOHOL (H): ICD-10-CM

## 2021-08-24 PROCEDURE — 99213 OFFICE O/P EST LOW 20 MIN: CPT | Performed by: NURSE PRACTITIONER

## 2021-08-24 ASSESSMENT — MIFFLIN-ST. JEOR: SCORE: 1726.48

## 2021-08-24 NOTE — PATIENT INSTRUCTIONS
Please call to schedule your chest/abdomen CT with Contrast before 2022  [facility] (xray, mammogram, bone density, and ultrasound) [phone]    [facility]     Discharged by : Millie Moore, CRISTI, APRN, CNP   Paper scripts provided to patient : none     If you have any questions regarding your visit please contact your care team:     Team Sherry              Clinic Hours Telephone Number     [address]   7am-7pm  Monday - Thursday   7am-5pm    [phone]   (Appointment scheduling available )     RN Line  [phone] option 2     Urgent [facility] - [place] - 11am-9pm Monday--- 9am-5pm     [place] -   5pm-9pm Monday--- 9am-5pm    [phone] - [place]    [phone] - [place]     For a Price Quote for your services, please call our Consumer Price Line at [phone].     What options do I have for visits at the clinic other than the traditional office visit?     To expand how we care for you, many of our providers are utilizing electronic visits (e-visits) and telephone visits, when medically appropriate, for interactions with their patients rather than a visit in the clinic. We also offer nurse visits for many medical concerns. Just like any other service, we will bill your insurance company for this type of visit based on time spent on the phone with your provider. Not all insurance companies cover these visits. Please check with your medical insurance if this type of visit is covered. You will be responsible for any charges that are not paid by your insurance.     E-visits via [employer]: generally incur a $45.00 fee.     Telephone visits:  Time spent on the phone: *charged based on time that is spent on the phone in increments of 10 minutes. Estimated cost:   5-10 mins $30.00   11-20 mins. $59.00   21-30 mins.  $85.00       Use Aspida (secure email communication and access to your chart) to send your primary care provider a message or make an appointment. Ask someone on your Team how to sign up for Aspida.     As always, Thank you for trusting us with your health care needs!      [facility]:    Scheduling Appointments  [facility]  Call: [phone]    [facility]  Call: [phone]    [facility]  Call: [phone]    For Gastroenterology referrals   [facility]   Clinics and Surgery Center, 4th Floor   [address] [place] [address]   Appointments: [phone]    WHERE TO GO FOR CARE?    Clinic    Make an appointment if you:       Are sick (cold, cough, flu, sore throat, earache or in pain).       Have a small injury (sprain, small cut, burn or broken bone).       Need a physical exam, Pap smear, vaccine or prescription refill.       Have questions about your health or medicines.    To reach us:      Call [phone] ([phone]). Open 24 hours every day. (For counseling services, call [phone].)    Log into Aspida at [email or web address]. (Visit [email or web address] to create an account.) Hospital emergency room    An emergency is a serious or life- threatening problem that must be treated right away.    Call 376 or get to the hospital if you have:      Very bad or sudden:            - Chest pain or pressure         - Bleeding         - Head or belly pain         - Dizziness or trouble seeing, walking or                          Speaking      Problems breathing      Blood in your vomit or you are coughing up blood      A major injury (knocked out, loss of a finger or limb, rape, broken bone protruding from skin)    A mental health crisis. (Or call the Mental Health Crisis line at 1-564.901.7713 or Suicide Prevention Hotline at 1-102.359.1400.)    Open 24 hours every day. You don't need  an appointment.     Urgent care    Visit urgent care for sickness or small injuries when the clinic is closed. You don't need an appointment. To check hours or find an urgent care near you, visit [email or web address]. Online care    Get online care from [facility] for more than 70 common problems, like colds, allergies and infections. Open 24 hours every day at:   [email or web address]   Need help deciding?    For advice about where to be seen, you may call your clinic and ask to speak with a nurse. We're here for you 24 hours every day.         If you are deaf or hard of hearing, please let us know. We provide many free services including sign language interpreters, oral interpreters, TTYs, telephone amplifiers, note takers and written materials.

## 2021-08-24 NOTE — PROGRESS NOTES
"    Assessment & Plan     Ingrowing toenail    - Orthopedic  Referral; Future    Liver disease, chronic, due to alcohol (H)  Alcohol dependence with uncomplicated intoxication (H)  Thrombocytopenia (H)  The above 3 diagnoses are currently not a problem.  But they are a previous problem that is Known that I take into account for their medical decisions, no current exacerbations or new concerns.               BMI:   Estimated body mass index is 35.57 kg/m  as calculated from the following:    Height as of this encounter: 1.676 m (5' 6\").    Weight as of this encounter: 100 kg (220 lb 6.4 oz).       Return in about 3 months (around 11/24/2021) for call to schedule Chest/Abdomen CT with contrast.    Millie Moore NP  [facility]    Denise Diaz is a 30 year old who presents for the following health issues     HPI     Ingrown toenails, both big toe's  Minimal pain.  She has been dealing with this for many years.  She soaks her feet about once per month and her  works on pulling out the ingrowing toenail with a tool.  She has also tried over-the-counter product that help prevent growth of the toenail-this has not been helpful.  She would like to get a referral to podiatry to discuss long-term management.    Review of Systems   Constitutional, HEENT, cardiovascular, pulmonary, gi and gu systems are negative, except as otherwise noted.      Objective    /60 (BP Location: Right arm)   Pulse 103   Temp 98.3  F (36.8  C) (Oral)   Resp 25   Ht 1.676 m (5' 6\")   Wt 100 kg (220 lb 6.4 oz)   SpO2 99%   BMI 35.57 kg/m    Body mass index is 35.57 kg/m .  Physical Exam   GENERAL: healthy, alert and no distress  PSYCH: mentation appears normal, affect normal/bright  Feet: Bilateral great toenails appear to be ingrowing.  No erythema, swelling, or purulent drainage.            "

## 2021-09-02 ENCOUNTER — OFFICE VISIT (OUTPATIENT)
Dept: PODIATRY | Facility: CLINIC | Age: 31
End: 2021-09-02
Attending: NURSE PRACTITIONER
Payer: COMMERCIAL

## 2021-09-02 VITALS
BODY MASS INDEX: 35.51 KG/M2 | DIASTOLIC BLOOD PRESSURE: 70 MMHG | HEART RATE: 90 BPM | WEIGHT: 220 LBS | SYSTOLIC BLOOD PRESSURE: 110 MMHG

## 2021-09-02 DIAGNOSIS — L60.0 INGROWING TOENAIL: ICD-10-CM

## 2021-09-02 PROCEDURE — 99203 OFFICE O/P NEW LOW 30 MIN: CPT | Performed by: PODIATRIST

## 2021-09-02 NOTE — LETTER
9/2/2021         RE: Karla Gordon  [address]  [place] [place] [place] [address]        Dear Colleague,    Thank you for referring your patient, Karla Gordon, to the [facility]. Please see a copy of my visit note below.    Subjective:    Pt is seen today in consult from Millie Moore w/ the c/c of a painful ingrown right and left great nail both border.  This has been problematic for many years.  Her mother had the same problem.. negativehistory of drainage from the site. This is slowly getting worse.  Aggravated by activity and relieved by rest.  Has tried soaking which has not helped.   denies history of trauma to the area.  Patient works as a .  She has history of smoking.  History of alcohol abuse.    ROS:  A 10-point review of systems was performed and is positive for that noted in the HPI and as seen above.  All other areas are negative.          Allergies   Allergen Reactions     Lexapro [Escitalopram] Nausea     Severe nausea; does not wish to take again     Spinach      Mouth gets numb       Current Outpatient Medications   Medication Sig Dispense Refill     acetaminophen (TYLENOL) 325 MG tablet Take 2 tablets (650 mg) by mouth every 6 hours as needed for mild pain Start after Delivery. 100 tablet 0     BIOTIN PO Take 1 tablet by mouth daily       cyclobenzaprine (FLEXERIL) 5 MG tablet Take 1 tablet (5 mg) by mouth 3 times daily as needed for muscle spasms 20 tablet 0     diphenhydrAMINE-acetaminophen (TYLENOL PM)  MG tablet Take 1 tablet by mouth nightly as needed for sleep       ferrous sulfate (FE TABS) 325 (65 Fe) MG EC tablet Take 1 tablet (325 mg) by mouth daily 60 tablet 1     FOLIC ACID PO Take 1 mg by mouth daily       hydrOXYzine (ATARAX) 25 MG tablet Take 1 tablet (25 mg) by mouth 2 times daily as needed for anxiety (sleep) May also take at bedtime as needed for sleep 30 tablet 3     Multiple Vitamins-Minerals (CENTRUM ULTRA  WOMENS PO) Take 1 tablet by mouth daily       Prenatal Vit-Fe Fumarate-FA (PRENATAL VITAMIN PO)        sertraline (ZOLOFT) 50 MG tablet Take 1 tablet (50 mg) by mouth daily 30 tablet 1     XULANE 150-35 MCG/24HR patch REMOVE OLD PATCH AND APPLY NEW PATCH ON SKIN ONCE A WEEK FOR 3 WEEKS, THEN REMOVE FOR 1 WEEK; THEN REPEAT 9 patch 0       Patient Active Problem List   Diagnosis     Numbness and tingling of foot     Tinnitus, bilateral     Tobacco use disorder     Thrombocytopenia (H)     Fatty liver     Macrocytic anemia     Alcohol use disorder, moderate, dependence (H)     Dietary folate deficiency anemia     Alcoholic hepatitis with ascites     Liver lesion     Lung nodule     Lymphadenopathy     Mild mitral regurgitation by prior echocardiogram     Cervical high risk HPV (human papillomavirus) test positive     Gestational diabetes     Liver disease, chronic, due to alcohol (H)       Past Medical History:   Diagnosis Date     Alcohol use disorder, mild, abuse 11/19/2018     Cervical high risk HPV (human papillomavirus) test positive 06/25/2019    See problem list.      Dietary folate deficiency anemia 11/20/2018     Fatty liver 3/22/2018    3/21/18:  ALT 76,  (4.2 xULN).   4/2/18:  Hepatitis B and C negative  Patient also with thrombocytopenia and mild macrocytic anemia  11/16/18:  IMPRESSION:     1. Increased hepatic echogenicity as can be seen in intrinsic parenchymal disease such as steatosis. 2. Liver is enlarged, measuring 21.6 cm. 3. Spleen is borderline enlarged, measuring 1.9 cm     Macrocytic anemia 4/3/2018     Thrombocytopenia (H) 3/22/2018    3/21/18:  Platelets noted at 92.   4/2/18:  Platelets 92.  Mild macrocytic anemia.  Peripheral smear pending, consider Hematology referral     Tobacco use disorder 3/21/2018       Past Surgical History:   Procedure Laterality Date     ABDOMINAL PARACENTESIS  01/24/2019    US Paracentesis with Imaging at [facility].  Fluid removed: 5910 mL     C ANESTH,SHOULDER  REPLACEMENT Right 2015    right partial shoulder replacement at age 25     HC TOOTH EXTRACTION W/FORCEP  2015     ORTHOPEDIC SURGERY         Family History   Problem Relation Age of Onset     Cancer Mother 62        bladder cancer     Hypertension Mother      Psoriasis Father      No Known Problems Brother      No Known Problems Sister      No Known Problems Son      Coronary Artery Disease Paternal Grandmother      Coronary Artery Disease Paternal Grandfather      Diabetes No family hx of      Hyperlipidemia No family hx of      Breast Cancer No family hx of      Colon Cancer No family hx of        Social History     Tobacco Use     Smoking status: Current Every Day Smoker     Packs/day: 0.50     Years: 7.00     Pack years: 3.50     Types: Cigarettes     Smokeless tobacco: Never Used   Substance Use Topics     Alcohol use: Not Currently     Comment: daily, few drinks a day, not during pregnancy         Exam:    Vitals: /70   Pulse 90   Wt 99.8 kg (220 lb)   BMI 35.51 kg/m    BMI: Body mass index is 35.51 kg/m .  Height: Data Unavailable    Constitutional/ general:  Pt is in no apparent distress, appears well-nourished.  Cooperative with history and physical exam.     Psych:  The patient answered questions appropriately.  Normal affect.  Seems to have reasonable expectations, in terms of treatment.     Eyes:  Visual scanning/ tracking without deficit.     Lungs:  Non labored breathing, non labored speech. No cough.  No audible wheezing. Even, quiet breathing.       Vascular:  positive pedal pulses bilaterally for both the DP and PT arteries.  CFT < 3 sec.  negative ankle edema.  positive pedal hair growth.    Neuro:  Alert and oriented x 3. Coordinated gait.  Light touch sensation is intact to the L4, L5, S1 distributions. No obvious deficits.  No evidence of neurological-based weakness, spasticity, or contracture in the lower extremities.     Derm: Normal texture and turgor.  No erythema, ecchymosis, or  cyanosis.      Musculoskeletal:    Lower extremity muscle strength is normal.  Patient is ambulatory without an assistive device or brace.  Normal arch with weightbearing.  No forefoot or rear foot deformities noted.   Normal ROM all fore foot and rearfoot joints.  No equinus.  right and left great toe nail both border shows soft tissue impingement with localized erythema.   negative active drainage/purulence at this time.  No sinus tracts.  No nailbed masses or exostosis.  No pain with range of motion of IPJ or MTPJ.  No ascending cellulitis.    ASSESSMENT:    Onychocryptosis with paronychia right and left great both border.    Discussed etiology and treatment options in detail w/ the pt.  The potential causes and nature of an ingrown toenail were discussed with the patient.  We reviewed the natural history/prognosis of the condition and potential risks if no treatment is provided.      Treatment options discussed included conservative management (oral antibiotics, soaking of foot, adequate width shoes)  as well as surgical management (partial or total nail removal).  The pros and cons of both forms of treatment were reviewed.  Handout given to patient.      After thorough discussion and answering all questions, the patient elected to have permanent removal of affected nail border.  Risk complications and efficacy discussed with patient.  We will set this up for 1 hour appointment in the future.  Thank you for allowing me participate in the care of this patient.      Calin Whiting DPM, FACFAS          Again, thank you for allowing me to participate in the care of your patient.        Sincerely,        Calin Whiting DPM

## 2021-09-02 NOTE — PROGRESS NOTES
Subjective:    Pt is seen today in consult from Millie Moore w/ the c/c of a painful ingrown right and left great nail both border.  This has been problematic for many years.  Her mother had the same problem.. negativehistory of drainage from the site. This is slowly getting worse.  Aggravated by activity and relieved by rest.  Has tried soaking which has not helped.   denies history of trauma to the area.  Patient works as a .  She has history of smoking.  History of alcohol abuse.    ROS:  A 10-point review of systems was performed and is positive for that noted in the HPI and as seen above.  All other areas are negative.          Allergies   Allergen Reactions     Lexapro [Escitalopram] Nausea     Severe nausea; does not wish to take again     Spinach      Mouth gets numb       Current Outpatient Medications   Medication Sig Dispense Refill     acetaminophen (TYLENOL) 325 MG tablet Take 2 tablets (650 mg) by mouth every 6 hours as needed for mild pain Start after Delivery. 100 tablet 0     BIOTIN PO Take 1 tablet by mouth daily       cyclobenzaprine (FLEXERIL) 5 MG tablet Take 1 tablet (5 mg) by mouth 3 times daily as needed for muscle spasms 20 tablet 0     diphenhydrAMINE-acetaminophen (TYLENOL PM)  MG tablet Take 1 tablet by mouth nightly as needed for sleep       ferrous sulfate (FE TABS) 325 (65 Fe) MG EC tablet Take 1 tablet (325 mg) by mouth daily 60 tablet 1     FOLIC ACID PO Take 1 mg by mouth daily       hydrOXYzine (ATARAX) 25 MG tablet Take 1 tablet (25 mg) by mouth 2 times daily as needed for anxiety (sleep) May also take at bedtime as needed for sleep 30 tablet 3     Multiple Vitamins-Minerals (CENTRUM ULTRA WOMENS PO) Take 1 tablet by mouth daily       Prenatal Vit-Fe Fumarate-FA (PRENATAL VITAMIN PO)        sertraline (ZOLOFT) 50 MG tablet Take 1 tablet (50 mg) by mouth daily 30 tablet 1     XULANE 150-35 MCG/24HR patch REMOVE OLD PATCH AND APPLY NEW PATCH ON SKIN ONCE A WEEK FOR 3  WEEKS, THEN REMOVE FOR 1 WEEK; THEN REPEAT 9 patch 0       Patient Active Problem List   Diagnosis     Numbness and tingling of foot     Tinnitus, bilateral     Tobacco use disorder     Thrombocytopenia (H)     Fatty liver     Macrocytic anemia     Alcohol use disorder, moderate, dependence (H)     Dietary folate deficiency anemia     Alcoholic hepatitis with ascites     Liver lesion     Lung nodule     Lymphadenopathy     Mild mitral regurgitation by prior echocardiogram     Cervical high risk HPV (human papillomavirus) test positive     Gestational diabetes     Liver disease, chronic, due to alcohol (H)       Past Medical History:   Diagnosis Date     Alcohol use disorder, mild, abuse 11/19/2018     Cervical high risk HPV (human papillomavirus) test positive 06/25/2019    See problem list.      Dietary folate deficiency anemia 11/20/2018     Fatty liver 3/22/2018    3/21/18:  ALT 76,  (4.2 xULN).   4/2/18:  Hepatitis B and C negative  Patient also with thrombocytopenia and mild macrocytic anemia  11/16/18:  IMPRESSION:     1. Increased hepatic echogenicity as can be seen in intrinsic parenchymal disease such as steatosis. 2. Liver is enlarged, measuring 21.6 cm. 3. Spleen is borderline enlarged, measuring 1.9 cm     Macrocytic anemia 4/3/2018     Thrombocytopenia (H) 3/22/2018    3/21/18:  Platelets noted at 92.   4/2/18:  Platelets 92.  Mild macrocytic anemia.  Peripheral smear pending, consider Hematology referral     Tobacco use disorder 3/21/2018       Past Surgical History:   Procedure Laterality Date     ABDOMINAL PARACENTESIS  01/24/2019    US Paracentesis with Imaging at [facility].  Fluid removed: 5910 mL     C ANESTH,SHOULDER REPLACEMENT Right 2015    right partial shoulder replacement at age 25     HC TOOTH EXTRACTION W/FORCEP  2015     ORTHOPEDIC SURGERY         Family History   Problem Relation Age of Onset     Cancer Mother 62        bladder cancer     Hypertension Mother      Psoriasis Father       No Known Problems Brother      No Known Problems Sister      No Known Problems Son      Coronary Artery Disease Paternal Grandmother      Coronary Artery Disease Paternal Grandfather      Diabetes No family hx of      Hyperlipidemia No family hx of      Breast Cancer No family hx of      Colon Cancer No family hx of        Social History     Tobacco Use     Smoking status: Current Every Day Smoker     Packs/day: 0.50     Years: 7.00     Pack years: 3.50     Types: Cigarettes     Smokeless tobacco: Never Used   Substance Use Topics     Alcohol use: Not Currently     Comment: daily, few drinks a day, not during pregnancy         Exam:    Vitals: /70   Pulse 90   Wt 99.8 kg (220 lb)   BMI 35.51 kg/m    BMI: Body mass index is 35.51 kg/m .  Height: Data Unavailable    Constitutional/ general:  Pt is in no apparent distress, appears well-nourished.  Cooperative with history and physical exam.     Psych:  The patient answered questions appropriately.  Normal affect.  Seems to have reasonable expectations, in terms of treatment.     Eyes:  Visual scanning/ tracking without deficit.     Lungs:  Non labored breathing, non labored speech. No cough.  No audible wheezing. Even, quiet breathing.       Vascular:  positive pedal pulses bilaterally for both the DP and PT arteries.  CFT < 3 sec.  negative ankle edema.  positive pedal hair growth.    Neuro:  Alert and oriented x 3. Coordinated gait.  Light touch sensation is intact to the L4, L5, S1 distributions. No obvious deficits.  No evidence of neurological-based weakness, spasticity, or contracture in the lower extremities.     Derm: Normal texture and turgor.  No erythema, ecchymosis, or cyanosis.      Musculoskeletal:    Lower extremity muscle strength is normal.  Patient is ambulatory without an assistive device or brace.  Normal arch with weightbearing.  No forefoot or rear foot deformities noted.   Normal ROM all fore foot and rearfoot joints.  No equinus.   right and left great toe nail both border shows soft tissue impingement with localized erythema.   negative active drainage/purulence at this time.  No sinus tracts.  No nailbed masses or exostosis.  No pain with range of motion of IPJ or MTPJ.  No ascending cellulitis.    ASSESSMENT:    Onychocryptosis with paronychia right and left great both border.    Discussed etiology and treatment options in detail w/ the pt.  The potential causes and nature of an ingrown toenail were discussed with the patient.  We reviewed the natural history/prognosis of the condition and potential risks if no treatment is provided.      Treatment options discussed included conservative management (oral antibiotics, soaking of foot, adequate width shoes)  as well as surgical management (partial or total nail removal).  The pros and cons of both forms of treatment were reviewed.  Handout given to patient.      After thorough discussion and answering all questions, the patient elected to have permanent removal of affected nail border.  Risk complications and efficacy discussed with patient.  We will set this up for 1 hour appointment in the future.  Thank you for allowing me participate in the care of this patient.      Calin Whiting, RACHEL, FACFAS

## 2021-09-02 NOTE — PATIENT INSTRUCTIONS
We wish you continued good healing. If you have any questions or concerns, please do not hesitate to contact us at [phone]    TXCOMt (secure e-mail communication and access to your chart) to send a message or to make an appointment.    Please remember to call and schedule a follow up appointment if one was recommended at your earliest convenience.     +++OF MARCH 2020+++ LOCATION AND HOURS HAVE CHANGED    PLEASE CALL CLINICS TO VERIFY DAYS AND TIMES  PODIATRY CLINIC HOURS  TELEPHONE NUMBER    Dr. Calin CORDEROPJAIME [facility]        Clinics:  [place] [facility]   Tuesday 1PM-6PM  [place]  Wednesday 745AM-330PM  [place]  Thursday/Friday 745AM-230PM  [place] [facility] APPOINTMENTS  [phone]    [place] APPOINTMENTS  [phone]          If you need a medication refill, please contact us you may need lab work and/or a follow up visit prior to your refill (i.e. Antifungal medications).    If MRI needed please call Imaging at [phone] or [phone]    HOW DO I GET MY KNEE SCOOTER? Knee scooters can be picked up at ANY Medical Supply stores with your knee scooter Prescription.  OR    Bring your signed prescription to an [facility] showroom.

## 2021-09-10 ENCOUNTER — OFFICE VISIT (OUTPATIENT)
Dept: PODIATRY | Facility: CLINIC | Age: 31
End: 2021-09-10
Payer: COMMERCIAL

## 2021-09-10 VITALS — WEIGHT: 220 LBS | OXYGEN SATURATION: 100 % | BODY MASS INDEX: 35.51 KG/M2 | HEART RATE: 90 BPM

## 2021-09-10 DIAGNOSIS — L60.0 INGROWING TOENAIL: Primary | ICD-10-CM

## 2021-09-10 DIAGNOSIS — B35.1 ONYCHOMYCOSIS: ICD-10-CM

## 2021-09-10 PROCEDURE — 11750 EXCISION NAIL&NAIL MATRIX: CPT | Mod: T5 | Performed by: PODIATRIST

## 2021-09-10 NOTE — LETTER
9/10/2021         RE: Karla Gordon  [address]  [place] [address]        Dear Colleague,    Thank you for referring your patient, Karla Gordon, to the [facility]. Please see a copy of my visit note below.    Subjective:    Pt is seen today for ingrown great toenail.  Points to right and left first toe lateral border(s).     Would like a permanent today.  Denies F/C/N/V. She notices nails are thick and discolored and has been this way for years      ROS:  No hx of wound healing problems, or numbness       Allergies   Allergen Reactions     Lexapro [Escitalopram] Nausea     Severe nausea; does not wish to take again     Spinach      Mouth gets numb       Current Outpatient Medications   Medication Sig Dispense Refill     acetaminophen (TYLENOL) 325 MG tablet Take 2 tablets (650 mg) by mouth every 6 hours as needed for mild pain Start after Delivery. 100 tablet 0     BIOTIN PO Take 1 tablet by mouth daily       cyclobenzaprine (FLEXERIL) 5 MG tablet Take 1 tablet (5 mg) by mouth 3 times daily as needed for muscle spasms 20 tablet 0     diphenhydrAMINE-acetaminophen (TYLENOL PM)  MG tablet Take 1 tablet by mouth nightly as needed for sleep       ferrous sulfate (FE TABS) 325 (65 Fe) MG EC tablet Take 1 tablet (325 mg) by mouth daily 60 tablet 1     FOLIC ACID PO Take 1 mg by mouth daily       hydrOXYzine (ATARAX) 25 MG tablet Take 1 tablet (25 mg) by mouth 2 times daily as needed for anxiety (sleep) May also take at bedtime as needed for sleep 30 tablet 3     Multiple Vitamins-Minerals (CENTRUM ULTRA WOMENS PO) Take 1 tablet by mouth daily       Prenatal Vit-Fe Fumarate-FA (PRENATAL VITAMIN PO)        sertraline (ZOLOFT) 50 MG tablet Take 1 tablet (50 mg) by mouth daily 30 tablet 1     XULANE 150-35 MCG/24HR patch REMOVE OLD PATCH AND APPLY NEW PATCH ON SKIN ONCE A WEEK FOR 3 WEEKS, THEN REMOVE FOR 1 WEEK; THEN REPEAT 9 patch 0       Patient  Active Problem List   Diagnosis     Numbness and tingling of foot     Tinnitus, bilateral     Tobacco use disorder     Thrombocytopenia (H)     Fatty liver     Macrocytic anemia     Alcohol use disorder, moderate, dependence (H)     Dietary folate deficiency anemia     Alcoholic hepatitis with ascites     Liver lesion     Lung nodule     Lymphadenopathy     Mild mitral regurgitation by prior echocardiogram     Cervical high risk HPV (human papillomavirus) test positive     Gestational diabetes     Liver disease, chronic, due to alcohol (H)       Past Medical History:   Diagnosis Date     Alcohol use disorder, mild, abuse 11/19/2018     Cervical high risk HPV (human papillomavirus) test positive 06/25/2019    See problem list.      Dietary folate deficiency anemia 11/20/2018     Fatty liver 3/22/2018    3/21/18:  ALT 76,  (4.2 xULN).   4/2/18:  Hepatitis B and C negative  Patient also with thrombocytopenia and mild macrocytic anemia  11/16/18:  IMPRESSION:     1. Increased hepatic echogenicity as can be seen in intrinsic parenchymal disease such as steatosis. 2. Liver is enlarged, measuring 21.6 cm. 3. Spleen is borderline enlarged, measuring 1.9 cm     Macrocytic anemia 4/3/2018     Thrombocytopenia (H) 3/22/2018    3/21/18:  Platelets noted at 92.   4/2/18:  Platelets 92.  Mild macrocytic anemia.  Peripheral smear pending, consider Hematology referral     Tobacco use disorder 3/21/2018       Past Surgical History:   Procedure Laterality Date     ABDOMINAL PARACENTESIS  01/24/2019    US Paracentesis with Imaging at [facility].  Fluid removed: 5910 mL     C ANESTH,SHOULDER REPLACEMENT Right 2015    right partial shoulder replacement at age 25     HC TOOTH EXTRACTION W/FORCEP  2015     ORTHOPEDIC SURGERY         Family History   Problem Relation Age of Onset     Cancer Mother 62        bladder cancer     Hypertension Mother      Psoriasis Father      No Known Problems Brother      No Known Problems Sister      No  Known Problems Son      Coronary Artery Disease Paternal Grandmother      Coronary Artery Disease Paternal Grandfather      Diabetes No family hx of      Hyperlipidemia No family hx of      Breast Cancer No family hx of      Colon Cancer No family hx of        Social History     Tobacco Use     Smoking status: Current Every Day Smoker     Packs/day: 0.50     Years: 7.00     Pack years: 3.50     Types: Cigarettes     Smokeless tobacco: Never Used   Substance Use Topics     Alcohol use: Not Currently     Comment: daily, few drinks a day, not during pregnancy         Objective:    There were no vitals taken for this visit.  Pulses are palpable +2/4 DP & PT bilateral.  Sensation to light touch is intact.  No fore foot deformities are noted.  Normal ROM all forefoot joints.  No evidence of deep abscess or infection noted.  Pain on palpation of right and left first toe both borders.  Erythema, edema, and some proud tissue noted.  Nail appears to be incurvated on the affected border. Both hallux nails mycotic right greater than left.    Assessment:  Onychocryptosis with paronychia right and left first toe both border(s)                          Onychomycosis    Plan:    Discussed cause of fungal infection and nail. Discussed treating this with topical antifungals first. Risk complications and efficacy discussed with her. She desires to do this she should wait until she is healed up from her permanent nail removal. She will call me if she would like to start a topical antifungal.     Discussed etiology and treatment options with the pt.  The potential causes and nature of an ingrown toenail were discussed with the patient.  We reviewed the natural history/prognosis of the condition and potential risks if no treatment is provided.      Treatment options discussed included conservative management (oral antibiotics, soaking of foot, adequate width shoes)  as well as surgical management (partial or total nail removal).  The  pros and cons of both forms of treatment were reviewed.  Handout dispensed.       After thorough discussion and answering all questions, the patient elected to have permanent removal of right and left first toe both borders.  Risk complications and efficacy discussed with patient.  Obtained consent, used 3cc of 1% lidocaine plain to block aformentioned toe(s).  Sterile prep, then avulsed right and left first toe both border(s).  No evidence of deep abscess noted.  Phenol was applied times 3 at 30 second intervals with curettage in between and then alcohol rinse.  Pt tolerated procedure well.  Sterile bandage placed, gave wound care instruction.  Return to clinic prn.    Calin Whiting DPM, FACFAS             Again, thank you for allowing me to participate in the care of your patient.        Sincerely,        Calin Whiting DPM

## 2021-09-10 NOTE — PROGRESS NOTES
Subjective:    Pt is seen today for ingrown great toenail.  Points to right and left first toe lateral border(s).     Would like a permanent today.  Denies F/C/N/V. She notices nails are thick and discolored and has been this way for years      ROS:  No hx of wound healing problems, or numbness       Allergies   Allergen Reactions     Lexapro [Escitalopram] Nausea     Severe nausea; does not wish to take again     Spinach      Mouth gets numb       Current Outpatient Medications   Medication Sig Dispense Refill     acetaminophen (TYLENOL) 325 MG tablet Take 2 tablets (650 mg) by mouth every 6 hours as needed for mild pain Start after Delivery. 100 tablet 0     BIOTIN PO Take 1 tablet by mouth daily       cyclobenzaprine (FLEXERIL) 5 MG tablet Take 1 tablet (5 mg) by mouth 3 times daily as needed for muscle spasms 20 tablet 0     diphenhydrAMINE-acetaminophen (TYLENOL PM)  MG tablet Take 1 tablet by mouth nightly as needed for sleep       ferrous sulfate (FE TABS) 325 (65 Fe) MG EC tablet Take 1 tablet (325 mg) by mouth daily 60 tablet 1     FOLIC ACID PO Take 1 mg by mouth daily       hydrOXYzine (ATARAX) 25 MG tablet Take 1 tablet (25 mg) by mouth 2 times daily as needed for anxiety (sleep) May also take at bedtime as needed for sleep 30 tablet 3     Multiple Vitamins-Minerals (CENTRUM ULTRA WOMENS PO) Take 1 tablet by mouth daily       Prenatal Vit-Fe Fumarate-FA (PRENATAL VITAMIN PO)        sertraline (ZOLOFT) 50 MG tablet Take 1 tablet (50 mg) by mouth daily 30 tablet 1     XULANE 150-35 MCG/24HR patch REMOVE OLD PATCH AND APPLY NEW PATCH ON SKIN ONCE A WEEK FOR 3 WEEKS, THEN REMOVE FOR 1 WEEK; THEN REPEAT 9 patch 0       Patient Active Problem List   Diagnosis     Numbness and tingling of foot     Tinnitus, bilateral     Tobacco use disorder     Thrombocytopenia (H)     Fatty liver     Macrocytic anemia     Alcohol use disorder, moderate, dependence (H)     Dietary folate deficiency anemia     Alcoholic  hepatitis with ascites     Liver lesion     Lung nodule     Lymphadenopathy     Mild mitral regurgitation by prior echocardiogram     Cervical high risk HPV (human papillomavirus) test positive     Gestational diabetes     Liver disease, chronic, due to alcohol (H)       Past Medical History:   Diagnosis Date     Alcohol use disorder, mild, abuse 11/19/2018     Cervical high risk HPV (human papillomavirus) test positive 06/25/2019    See problem list.      Dietary folate deficiency anemia 11/20/2018     Fatty liver 3/22/2018    3/21/18:  ALT 76,  (4.2 xULN).   4/2/18:  Hepatitis B and C negative  Patient also with thrombocytopenia and mild macrocytic anemia  11/16/18:  IMPRESSION:     1. Increased hepatic echogenicity as can be seen in intrinsic parenchymal disease such as steatosis. 2. Liver is enlarged, measuring 21.6 cm. 3. Spleen is borderline enlarged, measuring 1.9 cm     Macrocytic anemia 4/3/2018     Thrombocytopenia (H) 3/22/2018    3/21/18:  Platelets noted at 92.   4/2/18:  Platelets 92.  Mild macrocytic anemia.  Peripheral smear pending, consider Hematology referral     Tobacco use disorder 3/21/2018       Past Surgical History:   Procedure Laterality Date     ABDOMINAL PARACENTESIS  01/24/2019    US Paracentesis with Imaging at [facility].  Fluid removed: 5910 mL     C ANESTH,SHOULDER REPLACEMENT Right 2015    right partial shoulder replacement at age 25     HC TOOTH EXTRACTION W/FORCEP  2015     ORTHOPEDIC SURGERY         Family History   Problem Relation Age of Onset     Cancer Mother 62        bladder cancer     Hypertension Mother      Psoriasis Father      No Known Problems Brother      No Known Problems Sister      No Known Problems Son      Coronary Artery Disease Paternal Grandmother      Coronary Artery Disease Paternal Grandfather      Diabetes No family hx of      Hyperlipidemia No family hx of      Breast Cancer No family hx of      Colon Cancer No family hx of        Social History      Tobacco Use     Smoking status: Current Every Day Smoker     Packs/day: 0.50     Years: 7.00     Pack years: 3.50     Types: Cigarettes     Smokeless tobacco: Never Used   Substance Use Topics     Alcohol use: Not Currently     Comment: daily, few drinks a day, not during pregnancy         Objective:    There were no vitals taken for this visit.  Pulses are palpable +2/4 DP & PT bilateral.  Sensation to light touch is intact.  No fore foot deformities are noted.  Normal ROM all forefoot joints.  No evidence of deep abscess or infection noted.  Pain on palpation of right and left first toe both borders.  Erythema, edema, and some proud tissue noted.  Nail appears to be incurvated on the affected border. Both hallux nails mycotic right greater than left.    Assessment:  Onychocryptosis with paronychia right and left first toe both border(s)                          Onychomycosis    Plan:    Discussed cause of fungal infection and nail. Discussed treating this with topical antifungals first. Risk complications and efficacy discussed with her. She desires to do this she should wait until she is healed up from her permanent nail removal. She will call me if she would like to start a topical antifungal.     Discussed etiology and treatment options with the pt.  The potential causes and nature of an ingrown toenail were discussed with the patient.  We reviewed the natural history/prognosis of the condition and potential risks if no treatment is provided.      Treatment options discussed included conservative management (oral antibiotics, soaking of foot, adequate width shoes)  as well as surgical management (partial or total nail removal).  The pros and cons of both forms of treatment were reviewed.  Handout dispensed.       After thorough discussion and answering all questions, the patient elected to have permanent removal of right and left first toe both borders.  Risk complications and efficacy discussed with  patient.  Obtained consent, used 3cc of 1% lidocaine plain to block aformentioned toe(s).  Sterile prep, then avulsed right and left first toe both border(s).  No evidence of deep abscess noted.  Phenol was applied times 3 at 30 second intervals with curettage in between and then alcohol rinse.  Pt tolerated procedure well.  Sterile bandage placed, gave wound care instruction.  Return to clinic prn.    Calin Whiting DPM, FACFAS

## 2021-09-10 NOTE — NURSING NOTE
SMOKING CESSATION  What's in cigarette smoke? - Cigarette smoke contains over 4,000 chemicals. Nicotine is one of the main ingredients which is an insecticide/herbicide. It is poisonous to our nervous system, increases blood clotting risk, and decreases the body's defenses to fight off infection. Another chemical is Carbon Monoxide is an asphyxiating gas that permanently binds to blood cells and blocks the transport of oxygen. This leads to tissue death and decreases your metabolism. Tar is a chemical that coats your lungs and trachea which impairs new oxygen coming in and carbon dioxide getting out of your body.   How does smoking impact surgery? - Smoking is particularly hazardous with regards to surgery. Surgery puts stress on the body and a smoker's body is already under strain from these chemicals. Putting the two together, especially for an elective surgery, could be a recipe for disaster. Smoking before and after surgery increases your risk of heart problems, slow wound healing, delayed bone healing, blood clots, wound infection and anesthesia complications.   What are the benefits of quitting? - In 20 minutes your blood pressure will drop back down to normal. In 8 hours the carbon monoxide (a toxic gas) levels in your blood stream will drop by half, and oxygen levels will return to normal. In 48 hours your chance of having a heart attack will have decreased. All nicotine will have left your body. Your sense of taste and smell will return to a normal level. In 72 hours your bronchial tubes will relax, and your energy levels will increase. In 2 weeks your circulation will increase, and it will continue to improve for the next 10 weeks.    Recommendations for elective surgery - Ideally, patients should quit smoking 8 weeks before and at least 2 weeks after elective surgery in order to avoid complications. Simply cutting back on the amount of cigarettes smoked per day does not offer any benefit or decrease the  risk of poor wound healing, heart problems, and infection. Smokers should also start taking Vitamin C and B for two weeks before surgery and two weeks after surgery.    Ways to Stop Smokin. Nicotine patches, lozenges, or gum  2. Support Groups  3. Medications (see below)    List of Medications:  1. Varenicline Tartrate (CHANTIX) (may be discontinued by FDA as of 2021)  2. Bupropion HCL (WELLBUTRIN, ZYBAN) - note: make sure Wellbutrin is for smoking cessation and not other issues   3. Nicotine Patch (NICODERM)   4. Nicotine Inhaler (NICOTROL)   5. Nicotine Gum Nicotine Polacrilex   6. Nicotine Lozenge: Nicotine Polacrilex (COMMIT)   * [facility] offers a smoking support group as well!  Please visit: [email or web address]  If you are interested in these, ask about getting a prescription or talk to your primary care doctor about what may be the best way for you to quit.

## 2021-09-10 NOTE — PATIENT INSTRUCTIONS
INGROWN TOENAIL REMOVAL AFTERCARE ~PERMANENT NAIL REMOVAL      Go directly home and elevate the affected foot for the remainder of the day/evening if possible. Your toe may stay numb anywhere from 2-8 hours.     For pain take Tylenol, ibuprofen or another anti-inflammatory as needed for pain. You may apply ice on top of your foot behind he base of the toes, BUT, NOT ON IT.    That evening of the procedure, or morning after procedure.  you may remove the bandage. Began soaking foot three times a day (10-15 min each time) in lukewarm water with a pinch of salt. Epson or table salt     You may have  to do this for 6-8 weeks if Phenol was used during the procedure.    After soaks, pat the area dry. Apply antibiotic ointment to the area and cover with a band-aid.    The following day. Find a shoe that is comfortable and minimizes the amount of rubbing on your toe, as this increases pain.    Dress with band-aids until no longer draining. Those that have had the phenol procedure, the toe will drain longer and will look like it is infected because it is a chemical burn.    Watch for any signs and symptoms of infection such as: redness, red streaks going up the foot/leg, swelling, pus or foul odor. Fevers > 101   Please call with questions.    Dr. Calin WHITLEY FAS      We wish you continued good healing. If you have any questions or concerns, please do not hesitate to contact us at [phone]    [employer] (secure e-mail communication and access to your chart) to send a message or to make an appointment.    Please remember to call and schedule a follow up appointment if one was recommended at your earliest convenience.     +++OF MARCH 2020+++ LOCATION AND HOURS HAVE CHANGED    PLEASE CALL CLINICS TO VERIFY DAYS AND TIMES  PODIATRY CLINIC HOURS  TELEPHONE NUMBER    Dr. Calin ESPANA        Clinics:  [facility] [place] C[facility]   Tuesday 1PM-6PM  [place]  Wednesday  745AM-330PM  [place]  Thursday/Friday 745AM-230PM  [facility]  [phone]    [place] APPOINTMENTS  [phone]          If you need a medication refill, please contact us you may need lab work and/or a follow up visit prior to your refill (i.e. Antifungal medications).    If MRI needed please call Imaging at [phone] or [phone]    HOW DO I GET MY KNEE SCOOTER? Knee scooters can be picked up at ANY Medical Supply stores with your knee scooter Prescription.  OR    Bring your signed prescription to an [facility] showroom.

## 2021-10-10 ENCOUNTER — HEALTH MAINTENANCE LETTER (OUTPATIENT)
Age: 31
End: 2021-10-10

## 2021-11-03 ENCOUNTER — OFFICE VISIT (OUTPATIENT)
Dept: FAMILY MEDICINE | Facility: CLINIC | Age: 31
End: 2021-11-03
Payer: COMMERCIAL

## 2021-11-03 ENCOUNTER — ANCILLARY PROCEDURE (OUTPATIENT)
Dept: GENERAL RADIOLOGY | Facility: CLINIC | Age: 31
End: 2021-11-03
Attending: NURSE PRACTITIONER
Payer: COMMERCIAL

## 2021-11-03 VITALS
HEART RATE: 98 BPM | WEIGHT: 224.2 LBS | SYSTOLIC BLOOD PRESSURE: 126 MMHG | TEMPERATURE: 98 F | DIASTOLIC BLOOD PRESSURE: 80 MMHG | HEIGHT: 66 IN | RESPIRATION RATE: 22 BRPM | BODY MASS INDEX: 36.03 KG/M2 | OXYGEN SATURATION: 97 %

## 2021-11-03 DIAGNOSIS — G89.29 CHRONIC RIGHT SHOULDER PAIN: Primary | ICD-10-CM

## 2021-11-03 DIAGNOSIS — Z12.4 SCREENING FOR MALIGNANT NEOPLASM OF CERVIX: ICD-10-CM

## 2021-11-03 DIAGNOSIS — M25.511 CHRONIC RIGHT SHOULDER PAIN: Primary | ICD-10-CM

## 2021-11-03 DIAGNOSIS — G89.29 CHRONIC RIGHT SHOULDER PAIN: ICD-10-CM

## 2021-11-03 DIAGNOSIS — M25.511 CHRONIC RIGHT SHOULDER PAIN: ICD-10-CM

## 2021-11-03 DIAGNOSIS — F41.1 GENERALIZED ANXIETY DISORDER: ICD-10-CM

## 2021-11-03 PROCEDURE — 99213 OFFICE O/P EST LOW 20 MIN: CPT | Performed by: NURSE PRACTITIONER

## 2021-11-03 PROCEDURE — 73030 X-RAY EXAM OF SHOULDER: CPT | Mod: RT | Performed by: RADIOLOGY

## 2021-11-03 RX ORDER — HYDROXYZINE HYDROCHLORIDE 25 MG/1
25 TABLET, FILM COATED ORAL 2 TIMES DAILY PRN
Qty: 30 TABLET | Refills: 3 | Status: SHIPPED | OUTPATIENT
Start: 2021-11-03 | End: 2022-02-25

## 2021-11-03 ASSESSMENT — ENCOUNTER SYMPTOMS
NEUROLOGICAL NEGATIVE: 1
PSYCHIATRIC NEGATIVE: 1
CONSTITUTIONAL NEGATIVE: 1
CARDIOVASCULAR NEGATIVE: 1
EYES NEGATIVE: 1
GASTROINTESTINAL NEGATIVE: 1
RESPIRATORY NEGATIVE: 1

## 2021-11-03 ASSESSMENT — MIFFLIN-ST. JEOR: SCORE: 1738.71

## 2021-11-03 ASSESSMENT — PAIN SCALES - GENERAL: PAINLEVEL: MODERATE PAIN (4)

## 2021-11-03 NOTE — PATIENT INSTRUCTIONS
[facility]     Discharged by : Millie Moore, CRISTI, APRN, CNP   Paper scripts provided to patient : none     If you have any questions regarding your visit please contact your care team:     Team Sherry              Clinic Hours Telephone Number     [address]   7am-7pm  Monday - Thursday   7am-5pm  Fridays  [phone]   (Appointment scheduling available 24/7)     RN Line  [phone] option 2     Urgent Care - [place] and [place] - 11am-9pm Monday-Friday Saturday-Sunday- 9am-5pm     [place] -   5pm-9pm Monday-Friday Saturday-Sunday- 9am-5pm    [phone] - [place]    [phone] - [place]     For a Price Quote for your services, please call our [employer] Price Line at [phone].     What options do I have for visits at the clinic other than the traditional office visit?     To expand how we care for you, many of our providers are utilizing electronic visits (e-visits) and telephone visits, when medically appropriate, for interactions with their patients rather than a visit in the clinic. We also offer nurse visits for many medical concerns. Just like any other service, we will bill your insurance company for this type of visit based on time spent on the phone with your provider. Not all insurance companies cover these visits. Please check with your medical insurance if this type of visit is covered. You will be responsible for any charges that are not paid by your insurance.     E-visits via [employer]: generally incur a $45.00 fee.     Telephone visits:  Time spent on the phone: *charged based on time that is spent on the phone in increments of 10 minutes. Estimated cost:   5-10 mins $30.00   11-20 mins. $59.00   21-30 mins. $85.00       Use [employer] (secure email communication and access to your chart) to send your primary care provider a message or make an appointment. Ask someone on your Team how to sign up  for [employer] [facility] [employer].     As always, Thank you for trusting us with your health care needs!      [facility]:    Scheduling Appointments  [facility]  Call: [phone]    [facility] Coll[facility]  Call: [phone]    [facility]  Call: [phone]    For Gastroenterology referrals   [facility]   Clinics and Surgery Center, 4th Floor   [address] [place] [address]   Appointments: [phone]    WHERE TO GO FOR CARE?    Clinic    Make an appointment if you:       Are sick (cold, cough, flu, sore throat, earache or in pain).       Have a small injury (sprain, small cut, burn or broken bone).       Need a physical exam, Pap smear, vaccine or prescription refill.       Have questions about your health or medicines.    To reach us:      Call [phone] ([phone]). Open 24 hours every day. (For counseling services, call [phone].)    Log into [employer] [facility] [employer] at [email or web address]. (Visit [email or web address] to create an account.) Hospital emergency room    An emergency is a serious or life- threatening problem that must be treated right away.    Call 136 or get to the hospital if you have:      Very bad or sudden:            - Chest pain or pressure         - Bleeding         - Head or belly pain         - Dizziness or trouble seeing, walking or                          Speaking      Problems breathing      Blood in your vomit or you are coughing up blood      A major injury (knocked out, loss of a finger or limb, rape, broken bone protruding from skin)    A mental health crisis. (Or call the Mental Health Crisis line at 1-264.390.8152 or Suicide Prevention Hotline at 1-304.170.7702.)    Open 24 hours every day. You don't need an appointment.     Urgent care    Visit urgent care for sickness or small injuries when the clinic is closed. You don't need an appointment. To check hours or find an urgent care near  you, visit [email or web address]. Online care    Get online care from [facility] for more than 70 common problems, like colds, allergies and infections. Open 24 hours every day at:   [email or web address]   Need help deciding?    For advice about where to be seen, you may call your clinic and ask to speak with a nurse. We're here for you 24 hours every day.         If you are deaf or hard of hearing, please let us know. We provide many free services including sign language interpreters, oral interpreters, TTYs, telephone amplifiers, note takers and written materials.

## 2021-11-03 NOTE — PROGRESS NOTES
"  Assessment & Plan     Chronic right shoulder pain  - XR Shoulder Right G/E 3 Views  Completed this visit  - [facility] Ref[facility]  Has seen Dr. [facility] in the past, if not improving then would recommend follow-up    Generalized anxiety disorder  Chronic, stable, continue current treatment.   - hydrOXYzine (ATARAX) 25 MG tablet  Dispense: 30 tablet; Refill: 3    Screening for malignant neoplasm of cervix  -will set up appointment for physical to get pap done next visit               BMI:   Estimated body mass index is 36.19 kg/m  as calculated from the following:    Height as of this encounter: 1.676 m (5' 6\").    Weight as of this encounter: 101.7 kg (224 lb 3.2 oz).   Weight management plan: Discussed healthy diet and exercise guidelines    Work on weight loss  Regular exercise    Return in about 4 weeks (around 12/1/2021) for Physical Exam.    Christiana Mcconnell, Student NP acting as a scribe for Millie Moore DNP, APRN, CNP    The above patient was seen and evaluated with the NP student who acted as my scribe for the above note.    Millie Moore DNP, APRN, CNP     [facility]    Denise Diaz is a 31 year old who presents for the following health issues     HPI   -Cracking and popping of right shoulder that comes and goes, sharpness with weird movements such as moving arm too fast. Feels more tender.  -More stiff and tired and depending and if lifting, she is a  for work. Laying down right arm falls asleep which happens only at this time. Does have intermittent back pain.       -Will make appointment for physical, due for CMP and pap  -X-ray of right shoulder done this visit  -Meds are accurate, now new medications  -Work for exercise and has not been going to the gym with procedure on toes plans to go back soon to the gym  -No restrictions at work so far due to shoulder  -Has taken Tylenol and ibuprofen but does not help with the sharp pains    Musculoskeletal " "problem/pain  Onset/Duration: sore and tired shoulder   Description  Location: t shoulder  - right  Joint Swelling: no  Redness: no  Pain: YES  Warmth: no  Intensity:  moderate  Progression of Symptoms:  Constant, for 1 month   Accompanying signs and symptoms:   Fevers: no  Numbness/tingling/weakness: YES- weakness and numbness in arm History  Trauma to the area: no, but shoulder. Replacement 6 yrs in July    Recent illness:  no  Previous similar problem: no  Previous evaluation:  no  Precipitating or alleviating factors:  Aggravating factors include: lifting and certain  movememts   Therapies tried and outcome: heat, ice, stretching, acetaminophen and Ibuprofen        Review of Systems   Constitutional: Negative.    HENT: Negative.    Eyes: Negative.    Respiratory: Negative.    Cardiovascular: Negative.    Gastrointestinal: Negative.    Musculoskeletal:        Right shoulder pain sharp intermittent, back pain intermittent   Skin: Negative.    Neurological: Negative.    Psychiatric/Behavioral: Negative.             Objective    /80 (BP Location: Right arm)   Pulse 98   Temp 98  F (36.7  C) (Oral)   Resp 22   Ht 1.676 m (5' 6\")   Wt 101.7 kg (224 lb 3.2 oz)   SpO2 97%   BMI 36.19 kg/m    Body mass index is 36.19 kg/m .  Physical Exam   GENERAL: healthy, alert and no distress  EYES: Eyes grossly normal to inspection, PERRL and conjunctivae and sclerae normal  HENT: ear canals and TM's normal, nose and mouth without ulcers or lesions  NECK: no adenopathy, no asymmetry, masses, or scars and thyroid normal to palpation  RESP: lungs clear to auscultation - no rales, rhonchi or wheezes  CV: regular rate and rhythm, normal S1 S2, no S3 or S4, no murmur, click or rub, no peripheral edema and peripheral pulses strong  ABDOMEN: soft, nontender, no hepatosplenomegaly, no masses and bowel sounds normal  MS:  Right shoulder tender at AC joint with ROM, no edema  SKIN: no suspicious lesions or rashes  NEURO: Normal " strength and tone, mentation intact and speech normal  PSYCH: mentation appears normal, affect normal/bright    Right shoulder x-ray - Reviewed and interpreted by me, no apparent changes compared to prior x-ray.  Await radiology reading.

## 2021-11-22 ENCOUNTER — PATIENT OUTREACH (OUTPATIENT)
Dept: FAMILY MEDICINE | Facility: CLINIC | Age: 31
End: 2021-11-22
Payer: COMMERCIAL

## 2021-11-29 ENCOUNTER — THERAPY VISIT (OUTPATIENT)
Dept: PHYSICAL THERAPY | Facility: CLINIC | Age: 31
End: 2021-11-29
Attending: NURSE PRACTITIONER
Payer: COMMERCIAL

## 2021-11-29 DIAGNOSIS — G89.29 CHRONIC RIGHT SHOULDER PAIN: ICD-10-CM

## 2021-11-29 DIAGNOSIS — M25.511 CHRONIC RIGHT SHOULDER PAIN: ICD-10-CM

## 2021-11-29 PROCEDURE — 97161 PT EVAL LOW COMPLEX 20 MIN: CPT | Mod: GP | Performed by: PHYSICAL THERAPIST

## 2021-11-29 PROCEDURE — 97530 THERAPEUTIC ACTIVITIES: CPT | Mod: GP | Performed by: PHYSICAL THERAPIST

## 2021-11-29 PROCEDURE — 97110 THERAPEUTIC EXERCISES: CPT | Mod: GP | Performed by: PHYSICAL THERAPIST

## 2021-11-29 NOTE — PROGRESS NOTES
Physical Therapy Initial Evaluation  Subjective:  No  was used.   Patient Health History  Karla Gordon being seen for Shoulder pain.       Problem occurred: Not sure - overuse of shoulder/destruction of cartilage?   Pain is reported as 3/10 on pain scale.  General health as reported by patient is fair and good.  Pertinent medical history includes: anemia, history of fractures, overweight, pain at night/rest and smoking.     Medical allergies: none.   Surgeries include:  Orthopedic surgery.    Current medications:  Anti-inflammatory.    Current occupation is .   Primary job tasks include:  Lifting/carrying, prolonged standing and repetitive tasks.                  Therapist Generated HPI Evaluation  Problem details: Pt presents to PT with R shoulder pain.  She state she has a history of R shoulder pain, it is stiff and sore, it  cracking and pops which cause pain.      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    The current pain started May 2021,in  July 2021 she started a new job as a .    Right arm falls asleep at night      .         Type of problem:  Right shoulder.    This is a new condition.  Condition occurred with:  Unknown cause.  Where condition occurred: for unknown reasons.  Patient reports pain:  Anterior and upper trap.  Pain is described as aching, burning and sharp and is intermittent.  Pain radiates to:  Shoulder. Pain is worse during the day.  Since onset symptoms are gradually worsening.  Associated symptoms:  Loss of motion/stiffness, painful arc and numbness. Symptoms are exacerbated by using arm at shoulder level, using arm overhead, lying on extremity and certain positions  and relieved by rest and activity/movement.  Special tests included:  X-ray.  Previous treatment includes physical therapy and surgery (six years ago).   Restrictions due to condition include:  Working in normal job without restrictions.  Barriers include:   None as reported by patient.                        Objective:  System                   Shoulder Evaluation:  ROM:  AROM:    Flexion:  Right:  Wnl    Abduction:  Right:  Wnl, cracking    Internal Rotation:  Right:  Wnl    Horizontal Abduction:  Right:  Wnl        Flexion/External Rotation:  Right:  Wnl  Extension/Internal Rotation:  Right:  Wnl, sore at end range          Strength:    Flexion: Left:5/5   Pain:    Right: 4+/5     Pain:   Extension:  Left: 5/5    Pain:    Right: 5/5    Pain:  Abduction:  Left: 5/5  Pain:    Right: 4/5     Pain:  Adduction:  Left: 5/5    Pain:    Right: 4+/5     Pain:  Internal Rotation:  Left:5/5     Pain:    Right: 4+/5     Pain:  External Rotation:   Left:5/5     Pain:   Right:4/5     Pain:              Special Tests:      Right shoulder positive for the following special tests:Acrimioclavicular  Right shoulder negative for the following special tests:Impingement and Rotator cuff tear  Palpation:      Right shoulder tenderness present at: Supraspinatus                                     General     ROS    Assessment/Plan:    Patient is a 31 year old female with right side shoulder complaints.    Patient has the following significant findings with corresponding treatment plan.                Diagnosis 1:  Right shoulder pain  Pain -  hot/cold therapy, self management, education and home program  Decreased strength - therapeutic exercise and therapeutic activities    Therapy Evaluation Codes:   1) History comprised of:   Personal factors that impact the plan of care:      None.    Comorbidity factors that impact the plan of care are:      None.     Medications impacting care: None.  2) Examination of Body Systems comprised of:   Body structures and functions that impact the plan of care:      Shoulder.   Activity limitations that impact the plan of care are:      Lifting and Sleeping.  3) Clinical presentation characteristics are:   Stable/Uncomplicated.  4) Decision-Making    Low  complexity using standardized patient assessment instrument and/or measureable assessment of functional outcome.  Cumulative Therapy Evaluation is: Low complexity.    Previous and current functional limitations:  (See Goal Flow Sheet for this information)    Short term and Long term goals: (See Goal Flow Sheet for this information)     Communication ability:  Patient appears to be able to clearly communicate and understand verbal and written communication and follow directions correctly.  Treatment Explanation - The following has been discussed with the patient:   RX ordered/plan of care  Anticipated outcomes  Possible risks and side effects  This patient would benefit from PT intervention to resume normal activities.   Rehab potential is good.    Frequency:  1 X week, once daily  Duration:  for 6 weeks  Discharge Plan:  Achieve all LTG.  Independent in home treatment program.  Reach maximal therapeutic benefit.    Please refer to the daily flowsheet for treatment today, total treatment time and time spent performing 1:1 timed codes.

## 2021-12-06 NOTE — PROGRESS NOTES
[facility]    OUTPATIENT Physical Therapy ORTHOPEDIC EVALUATION  PLAN OF TREATMENT FOR OUTPATIENT REHABILITATION  (COMPLETE FOR INITIAL CLAIMS ONLY)  Patient's Last Name, First Name, M.I.  YOB: 1990  Alfredamaria tKarla Hart    Provider s Name:  [facility]   Medical Record No.  [id number]   Start of Care Date:   11/29/2021   Onset Date:    (MD 11/3/21)   Type:     _X__PT   ___OT Medical Diagnosis:    Encounter Diagnosis   Name Primary?    Chronic right shoulder pain         Treatment Diagnosis:  right shoulder pain        Goals:     11/29/21 0500   Body Part   Goals listed below are for right shoulder   Goal #1   Goal #1 reaching   Previous Functional Level No restrictions;Could reach;to counter height;to shoulder level;overhead   Current Functional Level Cannot reach ;overhead;out to the side;for a duration of   Performance level 5-10 min   STG Target Performance Reach ;to shoulder level   Rationale for meal preparation;for care of infant/toddler;for job requirements in their work place   Due date 12/29/21   LTG Target Performance Reach;overhead   Rationale for independent personal hygiene;for dressing;for bathing;for meal preparation;for care of infant/toddler;for job requirements in their work place   Due date 01/29/22       Therapy Frequency:     Predicted Duration of Therapy Intervention:       Taina Hargrove PT                 I CERTIFY THE NEED FOR THESE SERVICES FURNISHED UNDER        THIS PLAN OF TREATMENT AND WHILE UNDER MY CARE     (Physician attestation of this document indicates review and certification of the therapy plan).                       Certification Date From:    11/29/2021  Certification Date To:   2/27/22    Referring Provider:  Millie Moore    Initial Assessment        See Epic Evaluation

## 2021-12-29 DIAGNOSIS — Z30.016 ENCOUNTER FOR INITIAL PRESCRIPTION OF TRANSDERMAL PATCH HORMONAL CONTRACEPTIVE DEVICE: ICD-10-CM

## 2021-12-31 RX ORDER — NORELGESTROMIN AND ETHINYL ESTRADIOL 150; 35 UG/D; UG/D
PATCH TRANSDERMAL
Qty: 9 PATCH | Refills: 0 | Status: SHIPPED | OUTPATIENT
Start: 2021-12-31 | End: 2022-04-01

## 2021-12-31 NOTE — TELEPHONE ENCOUNTER
Medication is being filled for 1 time refill only due to:  Patient needs to be seen because needs appt.  Return in about 4 weeks (around 12/1/2021) for Physical Exam.

## 2022-01-23 NOTE — TELEPHONE ENCOUNTER
FYI to provider - Patient is lost to pap tracking follow-up. Attempts to contact pt have been made per reminder process and there has been no reply and/or no appt scheduled.       12/7/20 NIL pap, Neg HPV. Plan cotest in 1 year due bef 12/7/21.  12/14/20 Pt sent results via [facility].   1/20/21 My Chart not read. Letter sent.  11/22/21 Reminder [facility]  12/22/21 Reminder call - lm  1/23/22 Lost to follow-up for pap tracking

## 2022-01-25 ENCOUNTER — OFFICE VISIT (OUTPATIENT)
Dept: FAMILY MEDICINE | Facility: CLINIC | Age: 32
End: 2022-01-25
Payer: COMMERCIAL

## 2022-01-25 ENCOUNTER — ANCILLARY PROCEDURE (OUTPATIENT)
Dept: GENERAL RADIOLOGY | Facility: CLINIC | Age: 32
End: 2022-01-25
Attending: NURSE PRACTITIONER
Payer: COMMERCIAL

## 2022-01-25 VITALS
OXYGEN SATURATION: 97 % | TEMPERATURE: 98 F | BODY MASS INDEX: 36.48 KG/M2 | WEIGHT: 227 LBS | DIASTOLIC BLOOD PRESSURE: 70 MMHG | HEIGHT: 66 IN | HEART RATE: 97 BPM | RESPIRATION RATE: 12 BRPM | SYSTOLIC BLOOD PRESSURE: 124 MMHG

## 2022-01-25 DIAGNOSIS — M25.571 PAIN IN JOINT, ANKLE AND FOOT, RIGHT: ICD-10-CM

## 2022-01-25 DIAGNOSIS — S93.401A SPRAIN OF RIGHT ANKLE, UNSPECIFIED LIGAMENT, INITIAL ENCOUNTER: ICD-10-CM

## 2022-01-25 DIAGNOSIS — M25.571 PAIN IN JOINT, ANKLE AND FOOT, RIGHT: Primary | ICD-10-CM

## 2022-01-25 PROCEDURE — 99214 OFFICE O/P EST MOD 30 MIN: CPT | Performed by: NURSE PRACTITIONER

## 2022-01-25 PROCEDURE — 73610 X-RAY EXAM OF ANKLE: CPT | Mod: 26 | Performed by: RADIOLOGY

## 2022-01-25 ASSESSMENT — MIFFLIN-ST. JEOR: SCORE: 1751.42

## 2022-01-25 ASSESSMENT — PAIN SCALES - GENERAL: PAINLEVEL: SEVERE PAIN (6)

## 2022-01-25 NOTE — PROGRESS NOTES
Assessment & Plan     Pain in joint, ankle and foot, right  X-ray reviewed by me in clinic showing no acute fracture normal alignment recommend RICE she purchased a walking boot from [employer] and encouraged her to use this for 2 weeks I have also referred her to Ortho in the event her symptoms not improve or worsen  - XR Ankle Right G/E 3 Views; Future  - Orthopedic  Referral; Future    Sprain of right ankle, unspecified ligament, initial encounter    - Orthopedic  Referral; Future  Commended patient on maintaining sobriety  Ordering of each unique test  25 minutes spent on the date of the encounter doing chart review, history and exam, documentation and further activities per the note     Tobacco Cessation:   reports that she has been smoking cigarettes. She has a 3.50 pack-year smoking history. She has never used smokeless tobacco.  Tobacco Cessation Action Plan: Not addressed        No follow-ups on file.    DASHAWN Winter [facility]    Denise Diaz is a 31 year old who presents for the following health issues     HPI     Pain History:  When did you first notice your pain? - 1 to 6 weeks, 10 days ago,    Have you seen any provider previously for this issue? No  How has your pain affected your ability to work? Did take some time off work, works as a , get doctor's note for time off  Where in your body do you have pain? Musculoskeletal problem/pain  Onset/Duration: 10 days ago, patient slipped off step at house inside and landed flat on foot, did not roll ankle, putting/taking off shoe  Description  Location: foot/ankle - right  Joint Swelling: YES  Redness: no  Pain: YES,   Warmth: no  Intensity:  6/10  Progression of Symptoms:  same  Accompanying signs and symptoms:   Fevers: no  Numbness/tingling/weakness: YES, tingling on/off  History  Trauma to the area: YES  Recent illness:  no  Previous similar problem: no  Previous evaluation:   "no  Precipitating or alleviating factors:  Aggravating factors include: standing, walking, getting up, driving, stairs  Therapies tried and outcome: ice, support wrap and walking boot, ibuprofen           Review of Systems   Constitutional, HEENT, cardiovascular, pulmonary, gi and gu systems are negative, except as otherwise noted.      Objective    /70 (BP Location: Right arm, Patient Position: Chair, Cuff Size: Adult Large)   Pulse 97   Temp 98  F (36.7  C) (Tympanic)   Resp 12   Ht 1.676 m (5' 6\")   Wt 103 kg (227 lb)   LMP 01/24/2022 (Exact Date)   SpO2 97%   Breastfeeding No   BMI 36.64 kg/m    There is no height or weight on file to calculate BMI.  Physical Exam   GENERAL: healthy, alert and no distress  NECK: no adenopathy, no asymmetry, masses, or scars and thyroid normal to palpation  RESP: lungs clear to auscultation - no rales, rhonchi or wheezes  CV: regular rate and rhythm, normal S1 S2, no S3 or S4, no murmur, click or rub, no peripheral edema and peripheral pulses strong  ABDOMEN: soft, nontender, no hepatosplenomegaly, no masses and bowel sounds normal  MS: right foot- mild swelling and pain over talus, limping while walking    Orders Only on 04/16/2021   Component Date Value Ref Range Status     Color Urine 04/16/2021 Yellow   Final     Appearance Urine 04/16/2021 Clear   Final     Glucose Urine 04/16/2021 Negative  NEG^Negative mg/dL Final     Bilirubin Urine 04/16/2021 Negative  NEG^Negative Final     Ketones Urine 04/16/2021 Negative  NEG^Negative mg/dL Final     Specific Gravity Urine 04/16/2021 1.010  1.003 - 1.035 Final     pH Urine 04/16/2021 6.0  5.0 - 7.0 pH Final     Protein Albumin Urine 04/16/2021 Negative  NEG^Negative mg/dL Final     Urobilinogen Urine 04/16/2021 0.2  0.2 - 1.0 EU/dL Final     Nitrite Urine 04/16/2021 Negative  NEG^Negative Final     Blood Urine 04/16/2021 Small* NEG^Negative Final     Leukocyte Esterase Urine 04/16/2021 Small* NEG^Negative Final     " Source 04/16/2021 Midstream Urine   Final     WBC Urine 04/16/2021 0 - 5  OTO5^0 - 5 /HPF Final     RBC Urine 04/16/2021 O - 2  OTO2^O - 2 /HPF Final     Squamous Epithelial /LPF Urine 04/16/2021 Few  FEW^Few /LPF Final     Bacteria Urine 04/16/2021 Few* NEG^Negative /HPF Final

## 2022-01-25 NOTE — PATIENT INSTRUCTIONS
Patient Education     Treating Ankle Sprains  Treatment will depend on how bad your sprain is. For a severe sprain, healing may take 3 months or more.  Right after your injury: Use R.I.C.E.    BIG: Rest: At first, keep weight off the ankle as much as you can. You may be given crutches to help you walk without putting weight on the ankle.    BIG: Ice: Put an ice pack on the ankle for 20 minutes. Remove the pack and wait at least 30 minutes. Repeat for up to 3 days. This helps reduce swelling.    BIG: Compression: To reduce swelling and keep the joint stable, you may need to wrap the ankle with an elastic bandage. For more severe sprains, you may need an ankle brace, a boot, or a cast.    BIG: Elevation: To reduce swelling, keep your ankle raised above your heart when you sit or lie down.  Medicine  Your healthcare provider may suggest oral nonsteroidal anti-inflammatory medicine (NSAIDs), such as ibuprofen. This relieves the pain and helps reduce swelling. Be sure to take your medicine as directed.  Exercises    After about 2 to 3 weeks, you may be given exercises to strengthen the ligaments and muscles in the ankle. Doing these exercises will help prevent another ankle sprain. Exercises may include standing on your toes and then on your heels and doing ankle curls.    Sit on the edge of a sturdy table or lie on your back.    Pull your toes toward you. Then point them away from you. Repeat for 2 to 3 minutes.  [email or web address] last reviewed this educational content on 1/1/2018 2000-2021 [employer]. All rights reserved. This information is not intended as a substitute for professional medical care. Always follow your healthcare professional's instructions.

## 2022-01-25 NOTE — LETTER
[facility]  [address] [address]  Phone: [phone]    January 25, 2022        Karla Gordon  [address]  [place] [facility] [address]          To whom it may concern:    RE: Karla Gordon    Patient was seen and treated today at our clinic and missed work. She must wear a walking boot for the next 2 weeks at work. This will be reassessed if her injury requires longer healing.     Please contact me for questions or concerns.      Sincerely,        DASHAWN Winter CNP

## 2022-02-17 NOTE — PROGRESS NOTES
Reviewed and agree with Physical Therapy note/plan of care from 11/29/21.    Millie Moore, CRISTI, APRN, CNP

## 2022-05-02 ENCOUNTER — OFFICE VISIT (OUTPATIENT)
Dept: FAMILY MEDICINE | Facility: CLINIC | Age: 32
End: 2022-05-02
Payer: COMMERCIAL

## 2022-05-02 VITALS
HEART RATE: 88 BPM | BODY MASS INDEX: 35.36 KG/M2 | DIASTOLIC BLOOD PRESSURE: 70 MMHG | WEIGHT: 220 LBS | SYSTOLIC BLOOD PRESSURE: 110 MMHG | OXYGEN SATURATION: 95 % | HEIGHT: 66 IN

## 2022-05-02 DIAGNOSIS — Z86.32 HISTORY OF GESTATIONAL DIABETES: ICD-10-CM

## 2022-05-02 DIAGNOSIS — Z30.016 ENCOUNTER FOR INITIAL PRESCRIPTION OF TRANSDERMAL PATCH HORMONAL CONTRACEPTIVE DEVICE: ICD-10-CM

## 2022-05-02 DIAGNOSIS — R87.810 CERVICAL HIGH RISK HPV (HUMAN PAPILLOMAVIRUS) TEST POSITIVE: ICD-10-CM

## 2022-05-02 DIAGNOSIS — Z00.00 ROUTINE GENERAL MEDICAL EXAMINATION AT A HEALTH CARE FACILITY: Primary | ICD-10-CM

## 2022-05-02 DIAGNOSIS — E66.812 CLASS 2 SEVERE OBESITY WITH SERIOUS COMORBIDITY AND BODY MASS INDEX (BMI) OF 35.0 TO 35.9 IN ADULT, UNSPECIFIED OBESITY TYPE (H): ICD-10-CM

## 2022-05-02 DIAGNOSIS — M87.021: ICD-10-CM

## 2022-05-02 DIAGNOSIS — D52.0 DIETARY FOLATE DEFICIENCY ANEMIA: ICD-10-CM

## 2022-05-02 DIAGNOSIS — Z12.4 CERVICAL CANCER SCREENING: ICD-10-CM

## 2022-05-02 DIAGNOSIS — E66.01 CLASS 2 SEVERE OBESITY WITH SERIOUS COMORBIDITY AND BODY MASS INDEX (BMI) OF 35.0 TO 35.9 IN ADULT, UNSPECIFIED OBESITY TYPE (H): ICD-10-CM

## 2022-05-02 DIAGNOSIS — D69.6 THROMBOCYTOPENIA (H): ICD-10-CM

## 2022-05-02 DIAGNOSIS — K70.9 LIVER DISEASE, CHRONIC, DUE TO ALCOHOL (H): ICD-10-CM

## 2022-05-02 DIAGNOSIS — F10.21 HISTORY OF ALCOHOL DEPENDENCE (H): ICD-10-CM

## 2022-05-02 DIAGNOSIS — F41.1 GENERALIZED ANXIETY DISORDER: ICD-10-CM

## 2022-05-02 LAB
ERYTHROCYTE [DISTWIDTH] IN BLOOD BY AUTOMATED COUNT: 13.3 % (ref 10–15)
HBA1C MFR BLD: 5.6 % (ref 0–5.6)
HCT VFR BLD AUTO: 39.5 % (ref 35–47)
HGB BLD-MCNC: 12.8 G/DL (ref 11.7–15.7)
MCH RBC QN AUTO: 29.8 PG (ref 26.5–33)
MCHC RBC AUTO-ENTMCNC: 32.4 G/DL (ref 31.5–36.5)
MCV RBC AUTO: 92 FL (ref 78–100)
PLATELET # BLD AUTO: 362 10E3/UL (ref 150–450)
RBC # BLD AUTO: 4.3 10E6/UL (ref 3.8–5.2)
WBC # BLD AUTO: 14.1 10E3/UL (ref 4–11)

## 2022-05-02 PROCEDURE — 85027 COMPLETE CBC AUTOMATED: CPT | Performed by: STUDENT IN AN ORGANIZED HEALTH CARE EDUCATION/TRAINING PROGRAM

## 2022-05-02 PROCEDURE — 83036 HEMOGLOBIN GLYCOSYLATED A1C: CPT | Performed by: STUDENT IN AN ORGANIZED HEALTH CARE EDUCATION/TRAINING PROGRAM

## 2022-05-02 PROCEDURE — 36415 COLL VENOUS BLD VENIPUNCTURE: CPT | Performed by: STUDENT IN AN ORGANIZED HEALTH CARE EDUCATION/TRAINING PROGRAM

## 2022-05-02 PROCEDURE — 80053 COMPREHEN METABOLIC PANEL: CPT | Performed by: STUDENT IN AN ORGANIZED HEALTH CARE EDUCATION/TRAINING PROGRAM

## 2022-05-02 PROCEDURE — 99214 OFFICE O/P EST MOD 30 MIN: CPT | Mod: 25 | Performed by: STUDENT IN AN ORGANIZED HEALTH CARE EDUCATION/TRAINING PROGRAM

## 2022-05-02 PROCEDURE — 88175 CYTOPATH C/V AUTO FLUID REDO: CPT | Performed by: STUDENT IN AN ORGANIZED HEALTH CARE EDUCATION/TRAINING PROGRAM

## 2022-05-02 PROCEDURE — 87624 HPV HI-RISK TYP POOLED RSLT: CPT | Performed by: STUDENT IN AN ORGANIZED HEALTH CARE EDUCATION/TRAINING PROGRAM

## 2022-05-02 PROCEDURE — 99395 PREV VISIT EST AGE 18-39: CPT | Performed by: STUDENT IN AN ORGANIZED HEALTH CARE EDUCATION/TRAINING PROGRAM

## 2022-05-02 RX ORDER — NORELGESTROMIN AND ETHINYL ESTRADIOL 150; 35 UG/D; UG/D
PATCH TRANSDERMAL
Qty: 9 PATCH | Refills: 3 | Status: SHIPPED | OUTPATIENT
Start: 2022-05-02 | End: 2023-03-13

## 2022-05-02 RX ORDER — HYDROXYZINE HYDROCHLORIDE 25 MG/1
25 TABLET, FILM COATED ORAL 2 TIMES DAILY PRN
Qty: 30 TABLET | Refills: 3 | Status: SHIPPED | OUTPATIENT
Start: 2022-05-02 | End: 2022-07-11

## 2022-05-02 ASSESSMENT — ENCOUNTER SYMPTOMS
SHORTNESS OF BREATH: 0
WEAKNESS: 0
HEARTBURN: 0
PARESTHESIAS: 0
BREAST MASS: 0
DIARRHEA: 0
COUGH: 0
ABDOMINAL PAIN: 0
HEMATURIA: 0
DIZZINESS: 0
ARTHRALGIAS: 0
HEMATOCHEZIA: 0
HEADACHES: 0
CHILLS: 0
EYE PAIN: 0
FEVER: 0
PALPITATIONS: 0
CONSTIPATION: 0
SORE THROAT: 0
NAUSEA: 0
DYSURIA: 0
NERVOUS/ANXIOUS: 0
FREQUENCY: 0
MYALGIAS: 0
JOINT SWELLING: 0

## 2022-05-02 ASSESSMENT — ANXIETY QUESTIONNAIRES
6. BECOMING EASILY ANNOYED OR IRRITABLE: NOT AT ALL
7. FEELING AFRAID AS IF SOMETHING AWFUL MIGHT HAPPEN: NOT AT ALL
3. WORRYING TOO MUCH ABOUT DIFFERENT THINGS: SEVERAL DAYS
GAD7 TOTAL SCORE: 3
1. FEELING NERVOUS, ANXIOUS, OR ON EDGE: SEVERAL DAYS
GAD7 TOTAL SCORE: 3
GAD7 TOTAL SCORE: 3
7. FEELING AFRAID AS IF SOMETHING AWFUL MIGHT HAPPEN: NOT AT ALL
2. NOT BEING ABLE TO STOP OR CONTROL WORRYING: NOT AT ALL
4. TROUBLE RELAXING: SEVERAL DAYS
5. BEING SO RESTLESS THAT IT IS HARD TO SIT STILL: NOT AT ALL

## 2022-05-02 ASSESSMENT — PATIENT HEALTH QUESTIONNAIRE - PHQ9
10. IF YOU CHECKED OFF ANY PROBLEMS, HOW DIFFICULT HAVE THESE PROBLEMS MADE IT FOR YOU TO DO YOUR WORK, TAKE CARE OF THINGS AT HOME, OR GET ALONG WITH OTHER PEOPLE: SOMEWHAT DIFFICULT
SUM OF ALL RESPONSES TO PHQ QUESTIONS 1-9: 2
SUM OF ALL RESPONSES TO PHQ QUESTIONS 1-9: 2

## 2022-05-02 NOTE — PROGRESS NOTES
SUBJECTIVE:   CC: Karla Gordon is an 31 year old woman who presents for preventive health visit.       Patient has been advised of split billing requirements and indicates understanding: Yes  Healthy Habits:     Getting at least 3 servings of Calcium per day:  Yes    Bi-annual eye exam:  Yes    Dental care twice a year:  NO    Sleep apnea or symptoms of sleep apnea:  Excessive snoring    Diet:  Regular (no restrictions)    Frequency of exercise:  2-3 days/week    Duration of exercise:  15-30 minutes    Taking medications regularly:  Yes    Medication side effects:  None    PHQ-2 Total Score: 0    Additional concerns today:  No      Needs refill on birth control. Xulane patches. Has been on this for 2 years since having her last child.   No issues with remembering to take it.   Normal BP. No hx of clotting disorders. No migraines.   Smoker 1/2 PPD for 10 years.     Has 2 kids, 3yo and 5yo.   Undecided about more kids; doesn't think they will have more.   Regular periods with patches      APOLINAR - on hydroxyzine PRN for anxiety and sleep, issues turning mind off at night. Takes Hydroxzyine most nights. Used to be on zoloft and tolerated this well.      Hx of alcohol induced fatty liver disease - quit drinking 1 year ago and maintaing sobriety. Taking MV. No longer on folate. Hx of folate def anemia    Trying to improve diet. Weight is down 7 pounds from January 2022. Walking at least 1/2 hour per day.     Hx of GDM - no insulin needed. With last pregnancy 2 years ago     Hx of fractured shoulder in early 20s then had aseptic necrosis then shoulder replacement at 25yo. Now better ROM and strength          Today's PHQ-2 Score:   PHQ-2 ( 1999 [employer]) 5/2/2022   Q1: Little interest or pleasure in doing things 0   Q2: Feeling down, depressed or hopeless 0   PHQ-2 Score 0   PHQ-2 Total Score (12-17 Years)- Positive if 3 or more points; Administer PHQ-A if positive -   Q1: Little interest or pleasure in doing  things Not at all   Q2: Feeling down, depressed or hopeless Not at all   PHQ-2 Score 0       Abuse: Current or Past (Physical, Sexual or Emotional) - No  Do you feel safe in your environment? Yes    Have you ever done Advance Care Planning? (For example, a Health Directive, POLST, or a discussion with a medical provider or your loved ones about your wishes): No, advance care planning information given to patient to review.  Patient declined advance care planning discussion at this time.    Social History     Tobacco Use     Smoking status: Current Every Day Smoker     Packs/day: 0.50     Years: 7.00     Pack years: 3.50     Types: Cigarettes     Smokeless tobacco: Never Used   Substance Use Topics     Alcohol use: Not Currently     If you drink alcohol do you typically have >3 drinks per day or >7 drinks per week? No    Alcohol Use 5/2/2022   Prescreen: >3 drinks/day or >7 drinks/week? Not Applicable   Prescreen: >3 drinks/day or >7 drinks/week? -   AUDIT SCORE  -       Reviewed orders with patient.  Reviewed health maintenance and updated orders accordingly - Yes  Lab work is in process  Labs reviewed in EPIC    Breast Cancer Screening:    FHS-7: No flowsheet data found.  click delete button to remove this line now  Patient under 40 years of age: Routine Mammogram Screening not recommended.   Pertinent mammograms are reviewed under the imaging tab.    History of abnormal Pap smear:   Last 3 Pap and HPV Results:   PAP / HPV Latest Ref Rng & Units 12/7/2020 6/25/2019   PAP (Historical) - NIL NIL   HPV16 NEG:Negative Negative Negative   HPV18 NEG:Negative Negative Negative   HRHPV NEG:Negative Negative Positive(A)     PAP / HPV Latest Ref Rng & Units 12/7/2020 6/25/2019   PAP (Historical) - NIL NIL   HPV16 NEG:Negative Negative Negative   HPV18 NEG:Negative Negative Negative   HRHPV NEG:Negative Negative Positive(A)     Reviewed and updated as needed this visit by clinical staff   Tobacco  Allergies    Med Hx  Surg  Hx  Fam Hx  Soc Hx          Reviewed and updated as needed this visit by Provider                   Past Medical History:   Diagnosis Date     Alcohol use disorder, mild, abuse 2018     Cervical high risk HPV (human papillomavirus) test positive 2019    See problem list.      Dietary folate deficiency anemia 2018     Fatty liver 3/22/2018    3/21/18:  ALT 76,  (4.2 xULN).   18:  Hepatitis B and C negative  Patient also with thrombocytopenia and mild macrocytic anemia  18:  IMPRESSION:     1. Increased hepatic echogenicity as can be seen in intrinsic parenchymal disease such as steatosis. 2. Liver is enlarged, measuring 21.6 cm. 3. Spleen is borderline enlarged, measuring 1.9 cm     Macrocytic anemia 4/3/2018     Thrombocytopenia (H) 3/22/2018    3/21/18:  Platelets noted at 92.   18:  Platelets 92.  Mild macrocytic anemia.  Peripheral smear pending, consider Hematology referral     Tobacco use disorder 3/21/2018      Past Surgical History:   Procedure Laterality Date     ABDOMINAL PARACENTESIS  2019    US Paracentesis with [facility] at [facility].  Fluid removed: 5910 mL     HC TOOTH EXTRACTION W/FORCEP  2015     ORTHOPEDIC SURGERY       ZZC ANESTH,SHOULDER REPLACEMENT Right 2015    right partial shoulder replacement at age 25     OB History    Para Term  AB Living   3 2 2 0 1 2   SAB IAB Ectopic Multiple Live Births   0 1 0 0 2      # Outcome Date GA Lbr Gentry/2nd Weight Sex Delivery Anes PTL Lv   3 Term 19 39w5d 02:37 / 00:10 2.892 kg (6 lb 6 oz) F Vag-Spont EPI N KATELYN      Complications: GBS      Name: SIOMARAFEMALE-GERA      Apgar1: 9  Apgar5: 9   2 Term 16    M    KATELYN   1 IAB      IAB         Obstetric Comments               Pitocin augmentation.  Pushed for long time.  7 lb 6 oz                                                     Review of Systems   Constitutional: Negative for chills and fever.   HENT: Negative for congestion, ear  "pain, hearing loss and sore throat.    Eyes: Negative for pain and visual disturbance.   Respiratory: Negative for cough and shortness of breath.    Cardiovascular: Negative for chest pain, palpitations and peripheral edema.   Gastrointestinal: Negative for abdominal pain, constipation, diarrhea, heartburn, hematochezia and nausea.   Breasts:  Negative for tenderness, breast mass and discharge.   Genitourinary: Negative for dysuria, frequency, genital sores, hematuria, pelvic pain, urgency, vaginal bleeding and vaginal discharge.   Musculoskeletal: Negative for arthralgias, joint swelling and myalgias.   Skin: Negative for rash.   Neurological: Negative for dizziness, weakness, headaches and paresthesias.   Psychiatric/Behavioral: Negative for mood changes. The patient is not nervous/anxious.         OBJECTIVE:   /70 (BP Location: Right arm, Patient Position: Sitting, Cuff Size: Adult Regular)   Pulse 88   Ht 1.68 m (5' 6.14\")   Wt 99.8 kg (220 lb)   SpO2 95%   BMI 35.36 kg/m    Physical Exam  GENERAL: healthy, alert and no distress  EYES: Eyes grossly normal to inspection, PERRL and conjunctivae and sclerae normal  HENT: ear canals and TM's normal, nose and mouth without ulcers or lesions  NECK: no adenopathy, no asymmetry, masses, or scars and thyroid normal to palpation  RESP: lungs clear to auscultation - no rales, rhonchi or wheezes  BREAST: normal without masses, tenderness or nipple discharge and no palpable axillary masses or adenopathy  CV: regular rate and rhythm, normal S1 S2, no S3 or S4, no murmur, click or rub, no peripheral edema and peripheral pulses strong  ABDOMEN: soft, nontender, no hepatosplenomegaly, no masses and bowel sounds normal   (female): normal female external genitalia, normal urethral meatus, vaginal mucosa pink, moist, well rugated, and normal cervix/adnexa/uterus without masses or discharge, ectropion noted  MS: no gross musculoskeletal defects noted, no edema  SKIN: no " suspicious lesions or rashes  NEURO: Normal strength and tone, mentation intact and speech normal  PSYCH: mentation appears normal, affect normal/bright    Diagnostic Test Results:  Labs reviewed in Epic    ASSESSMENT/PLAN:   (Z00.00) Routine general medical examination at a health care facility  (primary encounter diagnosis)  Comment: normal vitals.   Plan: REVIEW OF HEALTH MAINTENANCE PROTOCOL ORDERS            (Z12.4) Cervical cancer screening    (R87.810) Cervical high risk HPV (human papillomavirus) test positive  Plan: Pap Screen with HPV - recommended age 30 - 65         years, HPV Hold (Lab Only)           (F41.1) Generalized anxiety disorder  Comment: well contorlled with PRN hydroxyzine, is not interested in starting a daily medication at this time.   Plan: hydrOXYzine (ATARAX) 25 MG tablet            (Z30.016) Encounter for initial prescription of transdermal patch hormonal contraceptive device  Comment: tolerates the patches well. Discussed some hesitancy to continued use after 32yo if she continues to smoke. Discussed smoking cessation. Discussed increased risks associated with estogen and smoking. We will refill but will monitor/re-evaluate next year. No other contraindications..   Plan: norelgestromin-ethinyl estradiol (XULANE)         150-35 MCG/24HR patch            (K70.9) Liver disease, chronic, due to alcohol (H)  Comment: congratulated on sobriety for 1 year. Will recheck liver enzymes. Stable.   Plan: COMPREHENSIVE METABOLIC PANEL            (F10.21) History of alcohol dependence (H)  Comment: remains sober. Has support system.     (D69.6) Thrombocytopenia (H)  Comment: recheck CBC. Suspect secondary to alcohol use.   Plan: CBC with Platelets            (D52.0) Dietary folate deficiency anemia  Comment: no longer taking folate supplement. She is no longer drinking which will help. Recheck CBC.   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 Doing well with good ROM/strength    (E66.01,  Z68.35) Class 2 severe obesity with serious comorbidity and body mass index (BMI) of 35.0 to 35.9 in adult, unspecified obesity type (H)  Comment: encourage increased physical activity and balanced diet    Patient has been advised of split billing requirements and indicates understanding: Yes    COUNSELING:  Reviewed preventive health counseling, as reflected in patient instructions       Regular exercise       Healthy diet/nutrition       Alcohol Use       Contraception       Family planning    Estimated body mass index is 35.36 kg/m  as calculated from the following:    Height as of this encounter: 1.68 m (5' 6.14\").    Weight as of this encounter: 99.8 kg (220 lb).    Weight management plan: Discussed healthy diet and exercise guidelines    She reports that she has been smoking cigarettes. She has a 3.50 pack-year smoking history. She has never used smokeless tobacco.  Tobacco Cessation Action Plan:   Information offered: Patient not interested at this time      Counseling Resources:  ATP IV Guidelines  Pooled Cohorts Equation Calculator  Breast Cancer Risk Calculator  BRCA-Related Cancer Risk Assessment: FHS-7 Tool  FRAX Risk Assessment  ICSI Preventive Guidelines  Dietary Guidelines for Americans, 2010  USDA's MyPlate  ASA Prophylaxis  Lung CA Screening    Dara Bustamante DO  [facility]  Answers for HPI/ROS submitted by the patient on 5/2/2022  If you checked off any problems, how difficult have these problems made it for you to do your work, take care of things at home, or get along with other people?: Somewhat difficult  PHQ9 TOTAL SCORE: 2  APOLINAR 7 TOTAL SCORE: 3      "

## 2022-05-03 LAB
ALBUMIN SERPL-MCNC: 3.6 G/DL (ref 3.4–5)
ALP SERPL-CCNC: 75 U/L (ref 40–150)
ALT SERPL W P-5'-P-CCNC: 25 U/L (ref 0–50)
ANION GAP SERPL CALCULATED.3IONS-SCNC: 4 MMOL/L (ref 3–14)
AST SERPL W P-5'-P-CCNC: 16 U/L (ref 0–45)
BILIRUB SERPL-MCNC: 0.3 MG/DL (ref 0.2–1.3)
BUN SERPL-MCNC: 16 MG/DL (ref 7–30)
CALCIUM SERPL-MCNC: 9.4 MG/DL (ref 8.5–10.1)
CHLORIDE BLD-SCNC: 107 MMOL/L (ref 94–109)
CO2 SERPL-SCNC: 25 MMOL/L (ref 20–32)
CREAT SERPL-MCNC: 0.6 MG/DL (ref 0.52–1.04)
GFR SERPL CREATININE-BSD FRML MDRD: >90 ML/MIN/1.73M2
GLUCOSE BLD-MCNC: 97 MG/DL (ref 70–99)
POTASSIUM BLD-SCNC: 4.4 MMOL/L (ref 3.4–5.3)
PROT SERPL-MCNC: 7.4 G/DL (ref 6.8–8.8)
SODIUM SERPL-SCNC: 136 MMOL/L (ref 133–144)

## 2022-05-03 ASSESSMENT — PATIENT HEALTH QUESTIONNAIRE - PHQ9: SUM OF ALL RESPONSES TO PHQ QUESTIONS 1-9: 2

## 2022-05-03 ASSESSMENT — ANXIETY QUESTIONNAIRES: GAD7 TOTAL SCORE: 3

## 2022-05-04 LAB
BKR LAB AP GYN ADEQUACY: NORMAL
BKR LAB AP GYN INTERPRETATION: NORMAL
BKR LAB AP HPV REFLEX: NORMAL
BKR LAB AP PREVIOUS ABNL DX: NORMAL
BKR LAB AP PREVIOUS ABNORMAL: NORMAL
PATH REPORT.COMMENTS IMP SPEC: NORMAL
PATH REPORT.COMMENTS IMP SPEC: NORMAL
PATH REPORT.RELEVANT HX SPEC: NORMAL

## 2022-05-05 LAB
HUMAN PAPILLOMA VIRUS 16 DNA: NEGATIVE
HUMAN PAPILLOMA VIRUS 18 DNA: NEGATIVE
HUMAN PAPILLOMA VIRUS FINAL DIAGNOSIS: NORMAL
HUMAN PAPILLOMA VIRUS OTHER HR: NEGATIVE

## 2022-07-28 NOTE — PROGRESS NOTES
Subjective:    Patient Health History  Karla Gordon being seen for Shoulder pain.       Problem occurred: Not sure - overuse of shoulder/destruction of cartilage?   Pain is reported as 3/10 on pain scale.  General health as reported by patient is fair and good.  Pertinent medical history includes: anemia, history of fractures, overweight, pain at night/rest and smoking.     Medical allergies: none.   Surgeries include:  Orthopedic surgery.    Current medications:  Anti-inflammatory.    Current occupation is .   Primary job tasks include:  Lifting/carrying, prolonged standing and repetitive tasks.                  Physical Exam                    Objective:  System    Physical Exam    General     ROS    Assessment/Plan:    DISCHARGE REPORT    Progress reporting period is from 11/29/21 to 7/28/22.       SUBJECTIVE  Subjective changes noted by patient:  unknown.  Subjective: see IE    Current pain level is NA  .     Previous pain level was  NA  .   Changes in function:  None  Adverse reaction to treatment or activity: None    OBJECTIVE  Changes noted in objective findings:  Patient has failed to return to therapy so current objective findings are unknown.  Objective: see IE     ASSESSMENT/PLAN  Updated problem list and treatment plan: Diagnosis 1:  Shoulder pain  Pain -  hot/cold therapy and manual therapy  Decreased strength - therapeutic exercise and therapeutic activities  STG/LTGs have been met or progress has been made towards goals:  Yes (See Goal flow sheet completed today.)  Assessment of Progress: The patient has not returned to therapy. Current status is unknown.  Self Management Plans:  Patient has been instructed in a home treatment program.  Pt did not return to PT  Karla continues to require the following intervention to meet STG and LTG's:  PT    Recommendations:  discharge from  PT      Please refer to the daily flowsheet for treatment today, total treatment time and time spent  performing 1:1 timed codes.

## 2022-08-01 ENCOUNTER — MYC MEDICAL ADVICE (OUTPATIENT)
Dept: FAMILY MEDICINE | Facility: CLINIC | Age: 32
End: 2022-08-01

## 2022-08-01 DIAGNOSIS — F41.1 GENERALIZED ANXIETY DISORDER: ICD-10-CM

## 2022-08-01 NOTE — TELEPHONE ENCOUNTER
"Routing to Dr. Bustamante- see topher asher    Last saw pt 5/2/22 for annual exam per clinic note - \"APOLINAR - on hydroxyzine PRN for anxiety and sleep, issues turning mind off at night. Takes Hydroxzyine most nights. Used to be on zoloft and tolerated this well.\"    Looks like was last ordered 4/26/21 and removed from med list 5/2/22 annual visit.     Okay to restart?    Rx pending approval if appropriate    Chyna Bueno RN       "

## 2022-08-01 NOTE — TELEPHONE ENCOUNTER
I am ok with restarting script but needs a follow up in 4-6 weeks before refills will be given    Dianna,

## 2022-09-01 ENCOUNTER — VIRTUAL VISIT (OUTPATIENT)
Dept: FAMILY MEDICINE | Facility: CLINIC | Age: 32
End: 2022-09-01
Payer: COMMERCIAL

## 2022-09-01 DIAGNOSIS — F41.1 GENERALIZED ANXIETY DISORDER: ICD-10-CM

## 2022-09-01 PROCEDURE — 96127 BRIEF EMOTIONAL/BEHAV ASSMT: CPT | Mod: 95 | Performed by: STUDENT IN AN ORGANIZED HEALTH CARE EDUCATION/TRAINING PROGRAM

## 2022-09-01 PROCEDURE — 99213 OFFICE O/P EST LOW 20 MIN: CPT | Mod: 95 | Performed by: STUDENT IN AN ORGANIZED HEALTH CARE EDUCATION/TRAINING PROGRAM

## 2022-09-01 RX ORDER — HYDROXYZINE HYDROCHLORIDE 25 MG/1
TABLET, FILM COATED ORAL
Qty: 30 TABLET | Refills: 5 | Status: SHIPPED | OUTPATIENT
Start: 2022-09-01 | End: 2022-12-12

## 2022-09-01 ASSESSMENT — PATIENT HEALTH QUESTIONNAIRE - PHQ9
10. IF YOU CHECKED OFF ANY PROBLEMS, HOW DIFFICULT HAVE THESE PROBLEMS MADE IT FOR YOU TO DO YOUR WORK, TAKE CARE OF THINGS AT HOME, OR GET ALONG WITH OTHER PEOPLE: SOMEWHAT DIFFICULT
SUM OF ALL RESPONSES TO PHQ QUESTIONS 1-9: 7
SUM OF ALL RESPONSES TO PHQ QUESTIONS 1-9: 7

## 2022-09-01 ASSESSMENT — ANXIETY QUESTIONNAIRES
GAD7 TOTAL SCORE: 5
8. IF YOU CHECKED OFF ANY PROBLEMS, HOW DIFFICULT HAVE THESE MADE IT FOR YOU TO DO YOUR WORK, TAKE CARE OF THINGS AT HOME, OR GET ALONG WITH OTHER PEOPLE?: SOMEWHAT DIFFICULT
2. NOT BEING ABLE TO STOP OR CONTROL WORRYING: SEVERAL DAYS
7. FEELING AFRAID AS IF SOMETHING AWFUL MIGHT HAPPEN: NOT AT ALL
5. BEING SO RESTLESS THAT IT IS HARD TO SIT STILL: NOT AT ALL
GAD7 TOTAL SCORE: 5
4. TROUBLE RELAXING: SEVERAL DAYS
7. FEELING AFRAID AS IF SOMETHING AWFUL MIGHT HAPPEN: NOT AT ALL
1. FEELING NERVOUS, ANXIOUS, OR ON EDGE: SEVERAL DAYS
3. WORRYING TOO MUCH ABOUT DIFFERENT THINGS: SEVERAL DAYS
6. BECOMING EASILY ANNOYED OR IRRITABLE: SEVERAL DAYS
GAD7 TOTAL SCORE: 5
IF YOU CHECKED OFF ANY PROBLEMS ON THIS QUESTIONNAIRE, HOW DIFFICULT HAVE THESE PROBLEMS MADE IT FOR YOU TO DO YOUR WORK, TAKE CARE OF THINGS AT HOME, OR GET ALONG WITH OTHER PEOPLE: SOMEWHAT DIFFICULT

## 2022-09-01 NOTE — PROGRESS NOTES
Emily is a 31 year old who is being evaluated via a billable telephone visit.      What phone number would you like to be contacted at? [phone]  How would you like to obtain your AVS? Gato    Assessment & Plan     Generalized anxiety disorder  Has improved since restarting zoloft 50 mg one month ago. She is doing much better now. Also doing mindfulness and meditation techniques. Would like to do therapy but doesn't have the time available right now. Using PRN hydroxyzine for sleep most nights and as needed in the day for anxiety. In the past had been on zoloft 50 mg daily. Doing well again on this dose Follow up in 9 months for routine prevent exam or sooner if any worsening.  - sertraline (ZOLOFT) 50 MG tablet; Take 1 tablet (50 mg) by mouth daily  - hydrOXYzine (ATARAX) 25 MG tablet; TAKE 1 TABLET(25 MG) BY MOUTH TWICE DAILY AS NEEDED FOR ANXIETY OR SLEEP. MAY ALSO TAKE AT BEDTIME AS NEEDED FOR SLEEP    Return in about 9 months (around 6/1/2023) for Follow up, Routine preventive.    Dara Bustamante DO  [facility]   Emily is a 31 year old, presenting for the following health issues:   Follow Up      History of Present Illness       Mental Health Follow-up:  Patient presents to follow-up on Depression & Anxiety.Patient's depression since last visit has been:  Better  The patient is not having other symptoms associated with depression.  Patient's anxiety since last visit has been:  Better  The patient is not having other symptoms associated with anxiety.  Any significant life events: job concerns, financial concerns and other  Patient is feeling anxious or having panic attacks.  Patient has no concerns about alcohol or drug use.     Today's PHQ-9         PHQ-9 Total Score: 7    PHQ-9 Q9 Thoughts of better off dead/self-harm past 2 weeks :   Not at all    How difficult have these problems made it for you to do your work, take care of things at home, or get along with  other people: Somewhat difficult  Today's APOLINAR-7 Score: 5          .    Was having a lot of life changes in a short period of time.  has new job. She was very impatient and quick to anger,unmotivated and stressed out. Was doing yoga, meditation, reading. Adding zoloft back has been helpful and she is feeling more stable and calm. Started taking it in AM and was making her tired. Switched to PM dose and this has helped.   No panic attacks. No SI.   Insomnia in the past- hydroxyzine helps with this. Also takes during the day sometimes for increased anxiety   Would like to do therapy but doesn't have time for it right now    Review of Systems   Constitutional, HEENT, cardiovascular, pulmonary, gi and gu systems are negative, except as otherwise noted.      Objective           Vitals:  No vitals were obtained today due to virtual visit.    Physical Exam   healthy, alert and no distress  PSYCH: Alert and oriented times 3; coherent speech, normal   rate and volume, able to articulate logical thoughts, able   to abstract reason, no tangential thoughts, no hallucinations   or delusions  Her affect is normal  RESP: No cough, no audible wheezing, able to talk in full sentences  Remainder of exam unable to be completed due to telephone visits            Phone call duration: 8 minutes    .  ..

## 2022-09-18 ENCOUNTER — HEALTH MAINTENANCE LETTER (OUTPATIENT)
Age: 32
End: 2022-09-18

## 2022-11-07 NOTE — PROGRESS NOTES
"Please see \"Imaging\" tab under \"Chart Review\" for details of today's US.    Nathalie Weinstein, DO    "
18

## 2022-11-18 ENCOUNTER — E-VISIT (OUTPATIENT)
Dept: FAMILY MEDICINE | Facility: CLINIC | Age: 32
End: 2022-11-18
Payer: COMMERCIAL

## 2022-11-18 DIAGNOSIS — Z20.818 EXPOSURE TO STREP THROAT: ICD-10-CM

## 2022-11-18 DIAGNOSIS — J02.9 SORE THROAT: Primary | ICD-10-CM

## 2022-11-18 PROCEDURE — 99421 OL DIG E/M SVC 5-10 MIN: CPT | Performed by: NURSE PRACTITIONER

## 2022-11-18 NOTE — PATIENT INSTRUCTIONS
Thank you for choosing us for your care. Given your symptoms, I would like you to do a lab-only visit to determine what is causing them.  I have placed the orders.  Please schedule an appointment with the lab right here in [employer], or call [phone].  I will let you know when the results are back and next steps to take.

## 2022-12-11 DIAGNOSIS — F41.1 GENERALIZED ANXIETY DISORDER: ICD-10-CM

## 2022-12-12 RX ORDER — HYDROXYZINE HYDROCHLORIDE 25 MG/1
TABLET, FILM COATED ORAL
Qty: 30 TABLET | Refills: 5 | Status: SHIPPED | OUTPATIENT
Start: 2022-12-12 | End: 2023-04-17

## 2023-03-13 DIAGNOSIS — Z30.016 ENCOUNTER FOR INITIAL PRESCRIPTION OF TRANSDERMAL PATCH HORMONAL CONTRACEPTIVE DEVICE: ICD-10-CM

## 2023-03-13 RX ORDER — NORELGESTROMIN AND ETHINYL ESTRADIOL 35; 150 UG/D; UG/D
PATCH TRANSDERMAL
Qty: 9 PATCH | Refills: 3 | Status: SHIPPED | OUTPATIENT
Start: 2023-03-13 | End: 2024-01-03

## 2023-04-13 ENCOUNTER — MYC MEDICAL ADVICE (OUTPATIENT)
Dept: FAMILY MEDICINE | Facility: CLINIC | Age: 33
End: 2023-04-13

## 2023-04-13 ENCOUNTER — LAB (OUTPATIENT)
Dept: FAMILY MEDICINE | Facility: CLINIC | Age: 33
End: 2023-04-13
Attending: NURSE PRACTITIONER
Payer: COMMERCIAL

## 2023-04-13 DIAGNOSIS — Z20.818 EXPOSURE TO STREP THROAT: ICD-10-CM

## 2023-04-13 DIAGNOSIS — J02.0 STREPTOCOCCAL PHARYNGITIS: Primary | ICD-10-CM

## 2023-04-13 DIAGNOSIS — J02.9 SORE THROAT: ICD-10-CM

## 2023-04-13 LAB — DEPRECATED S PYO AG THROAT QL EIA: POSITIVE

## 2023-04-13 PROCEDURE — 87880 STREP A ASSAY W/OPTIC: CPT

## 2023-04-13 RX ORDER — AMOXICILLIN 500 MG/1
500 CAPSULE ORAL 2 TIMES DAILY
Qty: 20 CAPSULE | Refills: 0 | Status: SHIPPED | OUTPATIENT
Start: 2023-04-13 | End: 2023-04-23

## 2023-04-13 NOTE — TELEPHONE ENCOUNTER
Routing to Millie Moore.    Patient saw you on 11/18/22 for Evisit and you ordered strep test.    She only just now took the strep test and it came back positive this am.    Wondering if you will prescribe abx?    Gisele Giordano RN

## 2023-04-13 NOTE — TELEPHONE ENCOUNTER
Reviewed and sent patient MyChart message back to address concern.    Millie Moore, CRISTI, APRN, CNP

## 2023-04-16 DIAGNOSIS — F41.1 GENERALIZED ANXIETY DISORDER: ICD-10-CM

## 2023-04-17 RX ORDER — HYDROXYZINE HYDROCHLORIDE 25 MG/1
TABLET, FILM COATED ORAL
Qty: 30 TABLET | Refills: 2 | Status: SHIPPED | OUTPATIENT
Start: 2023-04-17 | End: 2023-06-19

## 2023-04-20 ENCOUNTER — PATIENT OUTREACH (OUTPATIENT)
Dept: CARE COORDINATION | Facility: CLINIC | Age: 33
End: 2023-04-20
Payer: COMMERCIAL

## 2023-05-30 ENCOUNTER — MYC MEDICAL ADVICE (OUTPATIENT)
Dept: FAMILY MEDICINE | Facility: CLINIC | Age: 33
End: 2023-05-30
Payer: COMMERCIAL

## 2023-05-30 DIAGNOSIS — Z30.011 ENCOUNTER FOR INITIAL PRESCRIPTION OF CONTRACEPTIVE PILLS: Primary | ICD-10-CM

## 2023-05-30 RX ORDER — DESOGESTREL AND ETHINYL ESTRADIOL 0.15-0.03
1 KIT ORAL DAILY
Qty: 28 TABLET | Refills: 1 | Status: SHIPPED | OUTPATIENT
Start: 2023-05-30 | End: 2023-06-20

## 2023-05-30 NOTE — TELEPHONE ENCOUNTER
9/1/23 appointment:    Return in about 9 months (around 6/1/2023) for Follow up, Routine preventive.    Abbey Castanon RN

## 2023-06-03 ENCOUNTER — MYC MEDICAL ADVICE (OUTPATIENT)
Dept: EDUCATION SERVICES | Facility: CLINIC | Age: 33
End: 2023-06-03
Payer: COMMERCIAL

## 2023-06-16 DIAGNOSIS — F41.1 GENERALIZED ANXIETY DISORDER: ICD-10-CM

## 2023-06-19 RX ORDER — HYDROXYZINE HYDROCHLORIDE 25 MG/1
TABLET, FILM COATED ORAL
Qty: 30 TABLET | Refills: 2 | Status: SHIPPED | OUTPATIENT
Start: 2023-06-19 | End: 2023-08-14

## 2023-06-20 ENCOUNTER — OFFICE VISIT (OUTPATIENT)
Dept: MIDWIFE SERVICES | Facility: CLINIC | Age: 33
End: 2023-06-20
Payer: COMMERCIAL

## 2023-06-20 VITALS
TEMPERATURE: 97.7 F | WEIGHT: 221 LBS | HEIGHT: 67 IN | BODY MASS INDEX: 34.69 KG/M2 | SYSTOLIC BLOOD PRESSURE: 121 MMHG | HEART RATE: 81 BPM | DIASTOLIC BLOOD PRESSURE: 79 MMHG

## 2023-06-20 DIAGNOSIS — Z00.00 WELL WOMAN EXAM WITHOUT GYNECOLOGICAL EXAM: Primary | ICD-10-CM

## 2023-06-20 DIAGNOSIS — Z13.0 SCREENING FOR IRON DEFICIENCY ANEMIA: ICD-10-CM

## 2023-06-20 DIAGNOSIS — F10.21 HISTORY OF ALCOHOL DEPENDENCE (H): ICD-10-CM

## 2023-06-20 DIAGNOSIS — Z13.220 SCREENING FOR HYPERLIPIDEMIA: ICD-10-CM

## 2023-06-20 DIAGNOSIS — Z30.011 ENCOUNTER FOR INITIAL PRESCRIPTION OF CONTRACEPTIVE PILLS: ICD-10-CM

## 2023-06-20 DIAGNOSIS — E66.811 CLASS 1 OBESITY WITH BODY MASS INDEX (BMI) OF 34.0 TO 34.9 IN ADULT, UNSPECIFIED OBESITY TYPE, UNSPECIFIED WHETHER SERIOUS COMORBIDITY PRESENT: ICD-10-CM

## 2023-06-20 DIAGNOSIS — Z13.1 SCREENING FOR DIABETES MELLITUS: ICD-10-CM

## 2023-06-20 PROBLEM — E66.01 CLASS 2 SEVERE OBESITY WITH SERIOUS COMORBIDITY AND BODY MASS INDEX (BMI) OF 35.0 TO 35.9 IN ADULT, UNSPECIFIED OBESITY TYPE (H): Status: ACTIVE | Noted: 2023-06-20

## 2023-06-20 PROBLEM — E66.812 CLASS 2 SEVERE OBESITY WITH SERIOUS COMORBIDITY AND BODY MASS INDEX (BMI) OF 35.0 TO 35.9 IN ADULT, UNSPECIFIED OBESITY TYPE (H): Status: ACTIVE | Noted: 2023-06-20

## 2023-06-20 LAB
ALBUMIN SERPL BCG-MCNC: 4.5 G/DL (ref 3.5–5.2)
ALP SERPL-CCNC: 88 U/L (ref 35–104)
ALT SERPL W P-5'-P-CCNC: 13 U/L (ref 0–50)
ANION GAP SERPL CALCULATED.3IONS-SCNC: 16 MMOL/L (ref 7–15)
AST SERPL W P-5'-P-CCNC: 16 U/L (ref 0–45)
BILIRUB SERPL-MCNC: 0.4 MG/DL
BUN SERPL-MCNC: 13.8 MG/DL (ref 6–20)
CALCIUM SERPL-MCNC: 9.7 MG/DL (ref 8.6–10)
CHLORIDE SERPL-SCNC: 104 MMOL/L (ref 98–107)
CHOLEST SERPL-MCNC: 169 MG/DL
CREAT SERPL-MCNC: 0.56 MG/DL (ref 0.51–0.95)
DEPRECATED HCO3 PLAS-SCNC: 20 MMOL/L (ref 22–29)
ERYTHROCYTE [DISTWIDTH] IN BLOOD BY AUTOMATED COUNT: 13 % (ref 10–15)
GFR SERPL CREATININE-BSD FRML MDRD: >90 ML/MIN/1.73M2
GLUCOSE SERPL-MCNC: 101 MG/DL (ref 70–99)
HBA1C MFR BLD: 5.9 % (ref 0–5.6)
HCT VFR BLD AUTO: 43 % (ref 35–47)
HDLC SERPL-MCNC: 48 MG/DL
HGB BLD-MCNC: 14.1 G/DL (ref 11.7–15.7)
LDLC SERPL CALC-MCNC: 86 MG/DL
MCH RBC QN AUTO: 29.4 PG (ref 26.5–33)
MCHC RBC AUTO-ENTMCNC: 32.8 G/DL (ref 31.5–36.5)
MCV RBC AUTO: 90 FL (ref 78–100)
NONHDLC SERPL-MCNC: 121 MG/DL
PLATELET # BLD AUTO: 342 10E3/UL (ref 150–450)
POTASSIUM SERPL-SCNC: 4.4 MMOL/L (ref 3.4–5.3)
PROT SERPL-MCNC: 7.8 G/DL (ref 6.4–8.3)
RBC # BLD AUTO: 4.8 10E6/UL (ref 3.8–5.2)
SODIUM SERPL-SCNC: 140 MMOL/L (ref 136–145)
TRIGL SERPL-MCNC: 175 MG/DL
WBC # BLD AUTO: 12.2 10E3/UL (ref 4–11)

## 2023-06-20 PROCEDURE — 83036 HEMOGLOBIN GLYCOSYLATED A1C: CPT | Performed by: ADVANCED PRACTICE MIDWIFE

## 2023-06-20 PROCEDURE — 80053 COMPREHEN METABOLIC PANEL: CPT | Performed by: ADVANCED PRACTICE MIDWIFE

## 2023-06-20 PROCEDURE — 80061 LIPID PANEL: CPT | Performed by: ADVANCED PRACTICE MIDWIFE

## 2023-06-20 PROCEDURE — 85027 COMPLETE CBC AUTOMATED: CPT | Performed by: ADVANCED PRACTICE MIDWIFE

## 2023-06-20 PROCEDURE — 36415 COLL VENOUS BLD VENIPUNCTURE: CPT | Performed by: ADVANCED PRACTICE MIDWIFE

## 2023-06-20 PROCEDURE — 99385 PREV VISIT NEW AGE 18-39: CPT | Performed by: ADVANCED PRACTICE MIDWIFE

## 2023-06-20 RX ORDER — DESOGESTREL AND ETHINYL ESTRADIOL 0.15-0.03
1 KIT ORAL DAILY
Qty: 84 TABLET | Refills: 3 | Status: SHIPPED | OUTPATIENT
Start: 2023-06-20 | End: 2024-01-03

## 2023-06-20 NOTE — PROGRESS NOTES
Karla is a 32 year old  female who presents for annual exam.     Menses are regular q 28-30 days and normal lasting 4 days.  Menses flow: normal.  Patient's last menstrual period was 2023.. Using patch for contraception but had a series of patches that the adhesive wasn't working well so she decided to switch to OCP. She called and got a one month rx, will need a refill.  She is not currently considering pregnancy.  Besides routine health maintenance, she has no other health concerns today. Has been sober for two years and is doing well.   GYNECOLOGIC HISTORY:  Menarche: 10  Age at first intercourse: 17 Number of lifetime partners: 5  Karla is sexually active with 1male partner(s) and is currently in monogamous relationship .    History sexually transmitted infections:No STD history  STI testing offered?  Declined  ELDA exposure: No  History of abnormal Pap smear: YES - updated in Problem List and Health Maintenance accordingly  Family history of breast CA: No  Family history of uterine/ovarian CA: No    Family history of colon CA: No    HEALTH MAINTENANCE:  Cholesterol:   Cholesterol   Date Value Ref Range Status   2018 211 (H) <200 mg/dL Final     Comment:     Desirable:       <200 mg/dl    History of abnormal lipids: No  Mammo: no . History of abnormal Mammo: Not applicable.  Regular Self Breast Exams: No  Calcium/Vitamin D intake: source:  dairy, dietary supplement(s) Adequate? Yes  TSH:   TSH   Date Value Ref Range Status   2018 1.51 0.40 - 4.00 mU/L Final     Lab Results   Component Value Date    PAP NIL 2020    PAP NIL 2019: Pap NIL, HPV NEG    HISTORY:  OB History    Para Term  AB Living   3 2 2 0 1 2   SAB IAB Ectopic Multiple Live Births   0 1 0 0 2      # Outcome Date GA Lbr Gentry/2nd Weight Sex Delivery Anes PTL Lv   3 Term 19 39w5d 02:37 / 00:10 2.892 kg (6 lb 6 oz) F Vag-Spont EPI N KATELYN      Complications: GBS      Name:  SIOMARA,FEMALE-GERA      Apgar1: 9  Apgar5: 9   2 Term 16    M    KATELYN   1 IAB      IAB         Obstetric Comments               Pitocin augmentation.  Pushed for long time.  7 lb 6 oz                                                   Past Medical History:   Diagnosis Date     Alcohol use disorder, mild, abuse 2018     Cervical high risk HPV (human papillomavirus) test positive 2019    See problem list.      Dietary folate deficiency anemia 2018     Fatty liver 3/22/2018    3/21/18:  ALT 76,  (4.2 xULN).   18:  Hepatitis B and C negative  Patient also with thrombocytopenia and mild macrocytic anemia  18:  IMPRESSION:     1. Increased hepatic echogenicity as can be seen in intrinsic parenchymal disease such as steatosis. 2. Liver is enlarged, measuring 21.6 cm. 3. Spleen is borderline enlarged, measuring 1.9 cm     Macrocytic anemia 4/3/2018     Thrombocytopenia (H) 3/22/2018    3/21/18:  Platelets noted at 92.   18:  Platelets 92.  Mild macrocytic anemia.  Peripheral smear pending, consider Hematology referral     Tobacco use disorder 3/21/2018     Past Surgical History:   Procedure Laterality Date     ABDOMINAL PARACENTESIS  2019    US Paracentesis with Imaging at [facility].  Fluid removed: 5910 mL     HC TOOTH EXTRACTION W/FORCEP  2015     ORTHOPEDIC SURGERY       ZZC ANESTH,SHOULDER REPLACEMENT Right 2015    right partial shoulder replacement at age 25     Family History   Problem Relation Age of Onset     Cancer Mother 62        bladder cancer     Hypertension Mother      Psoriasis Father      No Known Problems Brother      No Known Problems Sister      No Known Problems Son      Coronary Artery Disease Paternal Grandmother      Coronary Artery Disease Paternal Grandfather      Diabetes No family hx of      Hyperlipidemia No family hx of      Breast Cancer No family hx of      Colon Cancer No family hx of      Social History     Socioeconomic History      Marital status:      Spouse name: None     Number of children: None     Years of education: None     Highest education level: None   Tobacco Use     Smoking status: Every Day     Packs/day: 0.50     Years: 7.00     Pack years: 3.50     Types: Cigarettes     Smokeless tobacco: Never   Substance and Sexual Activity     Alcohol use: Not Currently     Drug use: No     Sexual activity: Yes     Partners: Male     Birth control/protection: None       Current Outpatient Medications:      acetaminophen (TYLENOL) 325 MG tablet, Take 2 tablets (650 mg) by mouth every 6 hours as needed for mild pain Start after Delivery., Disp: 100 tablet, Rfl: 0     BIOTIN PO, Take 1 tablet by mouth daily, Disp: , Rfl:      desogestrel-ethinyl estradiol (APRI) 0.15-30 MG-MCG tablet, Take 1 tablet by mouth daily, Disp: 84 tablet, Rfl: 3     hydrOXYzine (ATARAX) 25 MG tablet, TAKE 1 TABLET(25 MG) BY MOUTH TWICE DAILY AS NEEDED FOR ANXIETY OR SLEEP. MAY ALSO TAKE AT BEDTIME AS NEEDED FOR SLEEP, Disp: 30 tablet, Rfl: 2     Multiple Vitamins-Minerals (CENTRUM ULTRA WOMENS PO), Take 1 tablet by mouth daily, Disp: , Rfl:      sertraline (ZOLOFT) 50 MG tablet, Take 1 tablet (50 mg) by mouth daily, Disp: 90 tablet, Rfl: 3     ZAFEMY 150-35 MCG/24HR patch, REMOVE OLD PATCH AND APPLY NEW PATCH ONTO THE SKIN ONCE A WEEK FOR 3 WEEKS. DO NOT WEAR PATCH WEEK 4, THEN REPEAT., Disp: 9 patch, Rfl: 3     Allergies   Allergen Reactions     Lexapro [Escitalopram] Nausea     Severe nausea; does not wish to take again     Spinach      Mouth gets numb       Past medical, surgical, social and family history were reviewed and updated in EPIC.    ROS:   C:     NEGATIVE for fever, chills, change in weight  I:       NEGATIVE for worrisome rashes, moles or lesions  E:     NEGATIVE for vision changes or irritation  E/M: NEGATIVE for ear, mouth and throat problems  R:     NEGATIVE for significant cough or SOB  CV:   NEGATIVE for chest pain, palpitations or peripheral  "edema  GI:     NEGATIVE for nausea, abdominal pain, heartburn, or change in bowel habits  :   NEGATIVE for frequency, dysuria, hematuria, vaginal discharge, or irregular bleeding  M:     NEGATIVE for significant arthralgias or myalgia  N:      NEGATIVE for weakness, dizziness or paresthesias  E:      NEGATIVE for temperature intolerance, skin/hair changes  P:      NEGATIVE for changes in mood or affect.    EXAM:  /79   Pulse 81   Temp 97.7  F (36.5  C)   Ht 1.702 m (5' 7\")   Wt 100.2 kg (221 lb)   LMP 05/20/2023   BMI 34.61 kg/m     BMI: Body mass index is 34.61 kg/m .  Constitutional: healthy, alert and no distress  Head: Normocephalic. No masses, lesions, tenderness or abnormalities  Neck: Neck supple. Trachea midline. No adenopathy. Thyroid symmetric, normal size.   Cardiovascular: RRR.   Respiratory: Negative.   Breast: Breasts reveal mild symmetric fibrocystic densities, but there are no dominant, discrete, fixed or suspicious masses found.  Gastrointestinal: Abdomen soft, non-tender, non-distended. No masses, organomegaly.  Musculoskeletal: extremities normal  Skin: no suspicious lesions or rashes  Psychiatric: Affect appropriate, cooperative,mentation appears normal.     COUNSELING:   Preventive Checklist   Some or all of the following items were discussed with the patient today if appropriate for their age/sex/medical problems/family history. See  or Plan for action taken.  Pap - due in 2025  Mammogram - start at 38  SBE's were discussed   Colonoscopy - start at 43  Screening labs: as ordered     reports that she has been smoking cigarettes. She has a 3.50 pack-year smoking history. She has never used smokeless tobacco.  Tobacco Cessation Action Plan: Information offered: Patient not interested at this time  Body mass index is 34.61 kg/m .    ASSESSMENT/PLAN:  32 year old female with satisfactory annual exam  (Z00.00) Well woman exam without gynecological exam  (primary encounter " diagnosis)  Comment:   Plan:     (Z13.220) Screening for hyperlipidemia  Comment:   Plan: Lipid Profile (Chol, Trig, HDL, LDL calc)            (Z13.1) Screening for diabetes mellitus  Comment:   Plan: Hemoglobin A1c            (Z13.0) Screening for iron deficiency anemia  Comment:   Plan: CBC with platelets            (Z30.011) Encounter for initial prescription of contraceptive pills  Comment:   Plan: desogestrel-ethinyl estradiol (APRI) 0.15-30         MG-MCG tablet            (F10.21) History of alcohol dependence (H)  Comment:   Plan: Comprehensive metabolic panel (BMP + Alb, Alk         Phos, ALT, AST, Total. Bili, TP)            (E66.9,  Z68.34) Class 1 obesity with body mass index (BMI) of 34.0 to 34.9 in adult, unspecified obesity type, unspecified whether serious comorbidity present  Comment:   Plan:     Will notify patient of lab results via [facility]  RTC yearly for annual exam  Juany Loya CNM

## 2023-08-13 DIAGNOSIS — F41.1 GENERALIZED ANXIETY DISORDER: ICD-10-CM

## 2023-08-14 RX ORDER — HYDROXYZINE HYDROCHLORIDE 25 MG/1
TABLET, FILM COATED ORAL
Qty: 30 TABLET | Refills: 2 | Status: SHIPPED | OUTPATIENT
Start: 2023-08-14 | End: 2023-10-10

## 2023-09-14 DIAGNOSIS — F41.1 GENERALIZED ANXIETY DISORDER: ICD-10-CM

## 2023-09-16 DIAGNOSIS — F41.1 GENERALIZED ANXIETY DISORDER: ICD-10-CM

## 2023-10-10 DIAGNOSIS — F41.1 GENERALIZED ANXIETY DISORDER: ICD-10-CM

## 2023-10-10 RX ORDER — HYDROXYZINE HYDROCHLORIDE 25 MG/1
TABLET, FILM COATED ORAL
Qty: 30 TABLET | Refills: 2 | Status: SHIPPED | OUTPATIENT
Start: 2023-10-10 | End: 2023-12-20

## 2023-10-23 ENCOUNTER — ANCILLARY PROCEDURE (OUTPATIENT)
Dept: GENERAL RADIOLOGY | Facility: CLINIC | Age: 33
End: 2023-10-23
Attending: FAMILY MEDICINE
Payer: COMMERCIAL

## 2023-10-23 ENCOUNTER — OFFICE VISIT (OUTPATIENT)
Dept: FAMILY MEDICINE | Facility: CLINIC | Age: 33
End: 2023-10-23
Payer: COMMERCIAL

## 2023-10-23 ENCOUNTER — MYC REFILL (OUTPATIENT)
Dept: FAMILY MEDICINE | Facility: CLINIC | Age: 33
End: 2023-10-23

## 2023-10-23 VITALS
DIASTOLIC BLOOD PRESSURE: 77 MMHG | SYSTOLIC BLOOD PRESSURE: 119 MMHG | HEIGHT: 67 IN | WEIGHT: 215.6 LBS | TEMPERATURE: 98.9 F | HEART RATE: 94 BPM | BODY MASS INDEX: 33.84 KG/M2 | RESPIRATION RATE: 18 BRPM | OXYGEN SATURATION: 100 %

## 2023-10-23 DIAGNOSIS — F41.1 GENERALIZED ANXIETY DISORDER: ICD-10-CM

## 2023-10-23 DIAGNOSIS — S96.911A STRAIN OF RIGHT ANKLE, INITIAL ENCOUNTER: Primary | ICD-10-CM

## 2023-10-23 DIAGNOSIS — S96.911A STRAIN OF RIGHT ANKLE, INITIAL ENCOUNTER: ICD-10-CM

## 2023-10-23 PROCEDURE — 73610 X-RAY EXAM OF ANKLE: CPT | Mod: TC | Performed by: RADIOLOGY

## 2023-10-23 PROCEDURE — 99213 OFFICE O/P EST LOW 20 MIN: CPT | Performed by: FAMILY MEDICINE

## 2023-10-23 ASSESSMENT — PAIN SCALES - GENERAL: PAINLEVEL: MODERATE PAIN (5)

## 2023-10-23 NOTE — PROGRESS NOTES
"  Assessment & Plan     Strain of right ankle, initial encounter  Reviewed XR with pt, no acute finding  Advise with RICE therapy  Wear walking boat for the next 2- 4 weeks, until feels better and no pain.    Discussed with patient if symptoms does not get better then she may need to follow-up with her primary care physician and get an MRI.  - XR Ankle Right G/E 3 Views; Future       BMI:   Estimated body mass index is 33.77 kg/m  as calculated from the following:    Height as of this encounter: 1.702 m (5' 7\").    Weight as of this encounter: 97.8 kg (215 lb 9.6 oz).   Weight management plan: Discussed healthy diet and exercise guidelines    Work on weight loss  Regular exercise      Denise Diaz is a 32 year old, presenting for the following health issues:  Injury  Patient reports she injured her right ankle over a year ago.  It did get better.    Now for the past few weeks she has been having more stiffness, more pain when she stands or walk.  She has no injury, she had no recent trauma, she does report she walks, and more pain when she stands and walks a lot.  She has no swelling.    She reports pain and stiffness specially at the end of the day.      History of Present Illness       Reason for visit:  Ankle pain, trouble walking/working  Symptom onset:  1-2 weeks ago  Symptom intensity:  Moderate  Symptom progression:  Worsening  Had these symptoms before:  No  What makes it worse:  Being on my feet  What makes it better:  Resting    She eats 2-3 servings of fruits and vegetables daily.She consumes 0 sweetened beverage(s) daily.She exercises with enough effort to increase her heart rate 30 to 60 minutes per day.  She exercises with enough effort to increase her heart rate 4 days per week.   She is taking medications regularly.       Review of Systems   Constitutional, HEENT, cardiovascular, pulmonary, gi and gu systems are negative, except as otherwise noted.      Objective    Temp 98.9  F (37.2  C) " "(Oral)   Resp 18   Ht 1.702 m (5' 7\")   Wt 97.8 kg (215 lb 9.6 oz)   LMP 09/20/2023 (Approximate)   BMI 33.77 kg/m    Body mass index is 33.77 kg/m .  Physical Exam   GENERAL: healthy, alert and no distress  MS: extremities normal- no gross deformities noted  Right ankle, and foot exam:  No effusion, no sign of injury.  Full strength, full range of motion, no weakness  Good capillary refills.  Minimal tenderness at the top of the ankle area.  No weakness  SKIN: no suspicious lesions or rashes  NEURO: Normal strength and tone, mentation intact and speech normal  PSYCH: mentation appears normal, affect normal/bright    Recent Results (from the past 24 hour(s))   XR Ankle Right G/E 3 Views    Narrative    ANKLE THREE VIEWS RIGHT  10/23/2023 2:58 PM     HISTORY: Strain of right ankle, initial encounter; pain  COMPARISON: 1/25/2022      Impression    IMPRESSION: No acute fracture is identified. There is normal joint  spacing and alignment. The ankle mortise is congruent. The talar dome  is unremarkable.    RACH LONDON MD         SYSTEM ID:  [id number]          Kwesi Bermudez MD              "

## 2023-11-18 DIAGNOSIS — F41.1 GENERALIZED ANXIETY DISORDER: ICD-10-CM

## 2023-12-19 DIAGNOSIS — F41.1 GENERALIZED ANXIETY DISORDER: ICD-10-CM

## 2023-12-19 NOTE — TELEPHONE ENCOUNTER
Called patient and left a voicemail message to call the clinic and schedule a med check appointment.      Venita Lynne

## 2023-12-19 NOTE — TELEPHONE ENCOUNTER
Patient needs a follow up anxiety appt. One 30 day script sent. Please let pt know thanks  Dianna GALE

## 2023-12-20 DIAGNOSIS — F41.1 GENERALIZED ANXIETY DISORDER: ICD-10-CM

## 2023-12-20 RX ORDER — HYDROXYZINE HYDROCHLORIDE 25 MG/1
TABLET, FILM COATED ORAL
Qty: 30 TABLET | Refills: 0 | Status: SHIPPED | OUTPATIENT
Start: 2023-12-20 | End: 2024-01-03

## 2024-01-03 ENCOUNTER — VIRTUAL VISIT (OUTPATIENT)
Dept: FAMILY MEDICINE | Facility: CLINIC | Age: 34
End: 2024-01-03
Payer: COMMERCIAL

## 2024-01-03 DIAGNOSIS — G43.009 MIGRAINE WITHOUT AURA AND WITHOUT STATUS MIGRAINOSUS, NOT INTRACTABLE: ICD-10-CM

## 2024-01-03 DIAGNOSIS — Z30.016 ENCOUNTER FOR INITIAL PRESCRIPTION OF TRANSDERMAL PATCH HORMONAL CONTRACEPTIVE DEVICE: ICD-10-CM

## 2024-01-03 DIAGNOSIS — Z80.8 FAMILY HISTORY OF SKIN CANCER: ICD-10-CM

## 2024-01-03 DIAGNOSIS — F41.1 GENERALIZED ANXIETY DISORDER: Primary | ICD-10-CM

## 2024-01-03 PROCEDURE — 99214 OFFICE O/P EST MOD 30 MIN: CPT | Mod: 95 | Performed by: STUDENT IN AN ORGANIZED HEALTH CARE EDUCATION/TRAINING PROGRAM

## 2024-01-03 RX ORDER — NORELGESTROMIN AND ETHINYL ESTRADIOL 35; 150 UG/D; UG/D
PATCH TRANSDERMAL
Qty: 9 PATCH | Refills: 3 | Status: SHIPPED | OUTPATIENT
Start: 2024-01-03 | End: 2024-06-26

## 2024-01-03 RX ORDER — SUMATRIPTAN 25 MG/1
25 TABLET, FILM COATED ORAL
Qty: 10 TABLET | Refills: 0 | Status: SHIPPED | OUTPATIENT
Start: 2024-01-03

## 2024-01-03 RX ORDER — HYDROXYZINE HYDROCHLORIDE 25 MG/1
TABLET, FILM COATED ORAL
Qty: 90 TABLET | Refills: 3 | Status: SHIPPED | OUTPATIENT
Start: 2024-01-03 | End: 2024-06-26

## 2024-01-03 ASSESSMENT — ANXIETY QUESTIONNAIRES
GAD7 TOTAL SCORE: 4
3. WORRYING TOO MUCH ABOUT DIFFERENT THINGS: SEVERAL DAYS
5. BEING SO RESTLESS THAT IT IS HARD TO SIT STILL: NOT AT ALL
2. NOT BEING ABLE TO STOP OR CONTROL WORRYING: SEVERAL DAYS
GAD7 TOTAL SCORE: 4
6. BECOMING EASILY ANNOYED OR IRRITABLE: NOT AT ALL
1. FEELING NERVOUS, ANXIOUS, OR ON EDGE: SEVERAL DAYS
IF YOU CHECKED OFF ANY PROBLEMS ON THIS QUESTIONNAIRE, HOW DIFFICULT HAVE THESE PROBLEMS MADE IT FOR YOU TO DO YOUR WORK, TAKE CARE OF THINGS AT HOME, OR GET ALONG WITH OTHER PEOPLE: NOT DIFFICULT AT ALL
7. FEELING AFRAID AS IF SOMETHING AWFUL MIGHT HAPPEN: NOT AT ALL

## 2024-01-03 ASSESSMENT — PATIENT HEALTH QUESTIONNAIRE - PHQ9
5. POOR APPETITE OR OVEREATING: SEVERAL DAYS
SUM OF ALL RESPONSES TO PHQ QUESTIONS 1-9: 2

## 2024-01-03 NOTE — PROGRESS NOTES
Emily is a 33 year old who is being evaluated via a billable telephone visit.      What phone number would you like to be contacted at? [phone]  How would you like to obtain your AVS? Gato    Distant Location (provider location):  On-site    Assessment & Plan     Generalized anxiety disorder  Has been adequately controlled with zoloft 50 mg daily and PRN hydroxyzine for anxiety related insomnia. We will continue on same medication.   - hydrOXYzine HCl (ATARAX) 25 MG tablet; TAKE 1 TABLET(25 MG) BY MOUTH TWICE DAILY AS NEEDED FOR ANXIETY OR SLEEP. MAY ALSO TAKE AT BEDTIME AS NEEDED FOR SLEEP  - sertraline (ZOLOFT) 50 MG tablet; Take 1 tablet (50 mg) by mouth daily    Encounter for initial prescription of transdermal patch hormonal contraceptive device  No new contraindications to use; has been tolerating patches well.   - norelgestromin-ethinyl estradiol (ZAFEMY) 150-35 MCG/24HR patch; REMOVE OLD PATCH AND APPLY NEW PATCH ONTO THE SKIN ONCE A WEEK FOR 3 WEEKS. DO NOT WEAR PATCH WEEK 4, THEN REPEAT.    Migraine without aura and without status migrainosus, not intractable  Less frequent and severe of migraines compared to the past; typically uses ibuprofen/tylenol. We will do a trial of imitrex for the next migraine   - SUMAtriptan (IMITREX) 25 MG tablet; Take 1 tablet (25 mg) by mouth at onset of headache for migraine May repeat in 2 hours. Max 8 tablets/24 hours.    Family history of skin cancer  Unsure what type. Would like to get a full skin check since has numerous moles.   - Adult Dermatology  Referral; Future    Daar Bustamante DO  [facility]    Denise Diaz is a 33 year old, presenting for the following health issues:   Follow Up and Referral        1/3/2024     5:02 PM   Additional Questions   Accompanied by na         1/3/2024     5:02 PM   Patient Reported Additional Medications   Patient reports taking the following new medications none       HPI        Derm-  Referral for skin check  Mother dx with skin cancer spring 2023. Unsure what type.   Has lots of moles and freckles - no new concerning ones.       Depression and Anxiety Follow-Up  How are you doing with your depression since your last visit? About the same  How are you doing with your anxiety since your last visit?  Varies on life events, but fine  Are you having other symptoms that might be associated with depression or anxiety? Insomnia has been botherting her for 2 the past months. Was using hydroxyzine until she ran out  Have you had a significant life event? No  Do you have any concerns with your use of alcohol or other drugs? No    Mental health has been okay. Stressful with holidays and lack of sleep and working a lot recently. Insomnia has gotten worse the last month or two, happens about 2-3 times a year ---hydroxyzine helps her brain shut down. Easier to get back to sleep when takes hydroxyzine. Takes hydroxyzine as needed for anxiety but does typically take it daily before bed. Zoloft has been good at current dose. Last panic attack a couple months ago and more mild.       Birth control   -- this is working better. They've been staying on better. Didn't like the pills, cause more mood changes with Isobloom OCP. No BP issues. No migraines with auras.     Social History     Tobacco Use    Smoking status: Every Day     Packs/day: 0.50     Years: 7.00     Additional pack years: 0.00     Total pack years: 3.50     Types: Cigarettes     Passive exposure: Never    Smokeless tobacco: Never   Vaping Use    Vaping Use: Never used   Substance Use Topics    Alcohol use: Not Currently    Drug use: No         5/2/2022     7:40 AM 9/1/2022    10:26 AM 1/3/2024     4:57 PM   PHQ   PHQ-9 Total Score 2 7 2   Q9: Thoughts of better off dead/self-harm past 2 weeks Not at all Not at all Not at all         5/2/2022     7:40 AM 9/1/2022    10:26 AM 1/3/2024     4:57 PM   APOLINAR-7 SCORE   Total Score 3 (minimal  anxiety) 5 (mild anxiety)    Total Score 3 5 4         Suicide Assessment Five-step Evaluation and Treatment (SAFE-T)                Review of Systems   Constitutional, HEENT, cardiovascular, pulmonary, gi and gu systems are negative, except as otherwise noted.      Objective           Vitals:  No vitals were obtained today due to virtual visit.    Physical Exam   healthy, alert, and no distress  PSYCH: Alert and oriented times 3; coherent speech, normal   rate and volume, able to articulate logical thoughts, able   to abstract reason, no tangential thoughts, no hallucinations   or delusions  Her affect is normal  RESP: No cough, no audible wheezing, able to talk in full sentences  Remainder of exam unable to be completed due to telephone visits                Phone call duration: 12 minutes

## 2024-03-04 ENCOUNTER — E-VISIT (OUTPATIENT)
Dept: FAMILY MEDICINE | Facility: CLINIC | Age: 34
End: 2024-03-04
Payer: COMMERCIAL

## 2024-03-04 DIAGNOSIS — B30.9 VIRAL CONJUNCTIVITIS: Primary | ICD-10-CM

## 2024-03-04 PROCEDURE — 99421 OL DIG E/M SVC 5-10 MIN: CPT | Performed by: STUDENT IN AN ORGANIZED HEALTH CARE EDUCATION/TRAINING PROGRAM

## 2024-03-05 NOTE — PATIENT INSTRUCTIONS
Thank you for choosing us for your care. I have placed an order for a prescription so that you can start treatment. View your full visit summary for details by clicking on the link below. Your pharmacist will able to address any questions you may have about the medication.     If you're not feeling better within 5-7 days, please schedule an appointment.  You can schedule an appointment right here in [facility], or call [phone]  If the visit is for the same symptoms as your eVisit, we'll refund the cost of your eVisit if seen within seven days.

## 2024-04-01 NOTE — PROGRESS NOTES
ASSESSMENT & PLAN    Emily was seen today for pain.    Diagnoses and all orders for this visit:    Injury of right ankle, sequela  -     Physical Therapy  Referral; Future  -     Ankle/Foot Bracing Supplies Order Walking Boot (Medium); Right; Pneumatic; Tall      Chronic issue, c/w previous ankle sprain.  She desires higher level of support, we discussed potential use of boot to start.  Additionally discussed rehab vs imaging with MRI. Plan PT next, and if ongoing issues, likely imaging for further evaluation.  See below.  Questions answered. Discussed signs and symptoms that may indicate more serious issues; the patient was instructed to seek appropriate care if noted. Emily indicates understanding of these issues and agrees with the plan.    See Patient Instructions  Patient Instructions   Reviewed nature of the right ankle issues, by history consistent with remote ankle sprain, and some ongoing symptoms.  We discussed support for the ankle, rehab approach, and additional imaging with MRI.  Following discussion, placed physical therapy referral.  Reviewed support options, including CAM Walker, ankle brace.  May use the brace that you have.  Reviewed use of walking boot again today, can use as desired with activities, weaning as able.  Defer additional imaging with MRI currently, but if not getting improvement with PT, then we can reconsider.  Plan to monitor course with physical therapy 4 to 6 weeks to begin.    If you have any further questions for your physician or physician s care team you can contact them thru [employer] or by calling [phone].      Cristian Drew [facility]    -----  Chief Complaint   Patient presents with    Right Ankle - Pain       SUBJECTIVE  Karlachristiano Gordon is a/an 33 year old female who is seen as a self referral for evaluation of right ankle.     The patient is seen by themselves.    Onset: 1-2 years(s) ago. Patient  "describes injury as a trip, inverting the ankle  Location of Pain: right ankle, anterior ankle, talar dome  Worsened by: walking, stiffness, notes a feeling of stabbing   Better with:   Treatments tried: ice heat, rest, ankle braces, ibuprofen, tylenol, icyhot  Associated symptoms: locking    Orthopedic/Surgical history: NO  Social History/Occupation:     **  Above information per rooming staff.  Additional history:  Describes initial inversion injury. Wore boot for couple months, then brace afterward. Seemed to improve overall, maybe occasional soreness.  Past < 1 year pain worse. Now pain is more anterior ankle, rather than previously laterally.  Has tried taping, bracing, stretching. Not much benefit.  Can limp at times related to activities, sometimes first thing in morning.  Rare locking sensation.        REVIEW OF SYSTEMS:  Review of Systems    OBJECTIVE:  Ht 1.702 m (5' 7\")   Wt 97.5 kg (215 lb)   BMI 33.67 kg/m           Right Ankle Exam:    Inspection:       no visible ecchymosis       no visible edema or effusion       Normal DP artery pulse       Normal PT artery pulse    ROM:        Grossly intact ROM with dorsiflexion, plantarflexion, inversion and eversion  Some anterior pain with inversion, eversion, PF    Strength:  Able to resist above motions, mild pain with eversion    Tender:       lateral ankle ligaments       dorsal tibiotalar joint    Special Tests:        anterior drawer         talar tilt   Pain with testing, some laxity with drawer        RADIOLOGY:  Final results and radiologist's interpretation, available in the [facility] health record.  Images were reviewed with the patient in the office today.  My personal interpretation of the performed imaging: no acute bony abnormality noted. Mortise intact.          ANKLE THREE VIEWS RIGHT  10/23/2023 2:58 PM      HISTORY: Strain of right ankle, initial encounter; pain  COMPARISON: 1/25/2022                                                      "                 IMPRESSION: No acute fracture is identified. There is normal joint  spacing and alignment. The ankle mortise is congruent. The talar dome  is unremarkable.     RACH LONDON MD               ANKLE RIGHT THREE OR MORE VIEWS   1/25/2022 10:26 AM      HISTORY: Pain in joint, ankle and foot, right.     COMPARISON: None.                                                                      IMPRESSION: Normal joint spacing and alignment. No fracture. Mortise  and talar dome are intact.     OFELIA SALMERON MD             Review of prior external note(s) from - primary care Oct 2023  Review of the result(s) of each unique test - imaging  Independent interpretation of a test performed by another physician/other qualified health care professional (not separately reported) - imaging

## 2024-04-02 ENCOUNTER — OFFICE VISIT (OUTPATIENT)
Dept: ORTHOPEDICS | Facility: CLINIC | Age: 34
End: 2024-04-02
Payer: COMMERCIAL

## 2024-04-02 VITALS — HEIGHT: 67 IN | BODY MASS INDEX: 33.74 KG/M2 | WEIGHT: 215 LBS

## 2024-04-02 DIAGNOSIS — S99.911S INJURY OF RIGHT ANKLE, SEQUELA: Primary | ICD-10-CM

## 2024-04-02 PROCEDURE — 99203 OFFICE O/P NEW LOW 30 MIN: CPT | Performed by: PEDIATRICS

## 2024-04-02 NOTE — PATIENT INSTRUCTIONS
Reviewed nature of the right ankle issues, by history consistent with remote ankle sprain, and some ongoing symptoms.  We discussed support for the ankle, rehab approach, and additional imaging with MRI.  Following discussion, placed physical therapy referral.  Reviewed support options, including CAM Walker, ankle brace.  May use the brace that you have.  Reviewed use of walking boot again today, can use as desired with activities, weaning as able.  Defer additional imaging with MRI currently, but if not getting improvement with PT, then we can reconsider.  Plan to monitor course with physical therapy 4 to 6 weeks to begin.    If you have any further questions for your physician or physician s care team you can contact them thru [employer] or by calling [phone].

## 2024-04-02 NOTE — LETTER
4/2/2024         RE: Karla Gordon  [address] [address]        Dear Colleague,    Thank you for referring your patient, Karla Gordon, to the [facility]. Please see a copy of my visit note below.    ASSESSMENT & PLAN    Emily was seen today for pain.    Diagnoses and all orders for this visit:    Injury of right ankle, sequela  -     Physical Therapy  Referral; Future  -     Ankle/Foot Bracing Supplies Order Walking Boot (Medium); Right; Pneumatic; Tall      Chronic issue, c/w previous ankle sprain.  She desires higher level of support, we discussed potential use of boot to start.  Additionally discussed rehab vs imaging with MRI. Plan PT next, and if ongoing issues, likely imaging for further evaluation.  See below.  Questions answered. Discussed signs and symptoms that may indicate more serious issues; the patient was instructed to seek appropriate care if noted. Emily indicates understanding of these issues and agrees with the plan.    See Patient Instructions  Patient Instructions   Reviewed nature of the right ankle issues, by history consistent with remote ankle sprain, and some ongoing symptoms.  We discussed support for the ankle, rehab approach, and additional imaging with MRI.  Following discussion, placed physical therapy referral.  Reviewed support options, including CAM Walker, ankle brace.  May use the brace that you have.  Reviewed use of walking boot again today, can use as desired with activities, weaning as able.  Defer additional imaging with MRI currently, but if not getting improvement with PT, then we can reconsider.  Plan to monitor course with physical therapy 4 to 6 weeks to begin.    If you have any further questions for your physician or physician s care team you can contact them thru [employer] or by calling [phone].      Cristian Drew,   [facility]  "[facility]    -----  Chief Complaint   Patient presents with     Right Ankle - Pain       SUBJECTIVE  Karla Gordon is a/an 33 year old female who is seen as a self referral for evaluation of right ankle.     The patient is seen by themselves.    Onset: 1-2 years(s) ago. Patient describes injury as a trip, inverting the ankle  Location of Pain: right ankle, anterior ankle, talar dome  Worsened by: walking, stiffness, notes a feeling of stabbing   Better with:   Treatments tried: ice heat, rest, ankle braces, ibuprofen, tylenol, icyhot  Associated symptoms: locking    Orthopedic/Surgical history: NO  Social History/Occupation:     **  Above information per rooming staff.  Additional history:  Describes initial inversion injury. Wore boot for couple months, then brace afterward. Seemed to improve overall, maybe occasional soreness.  Past < 1 year pain worse. Now pain is more anterior ankle, rather than previously laterally.  Has tried taping, bracing, stretching. Not much benefit.  Can limp at times related to activities, sometimes first thing in morning.  Rare locking sensation.        REVIEW OF SYSTEMS:  Review of Systems    OBJECTIVE:  Ht 1.702 m (5' 7\")   Wt 97.5 kg (215 lb)   BMI 33.67 kg/m           Right Ankle Exam:    Inspection:       no visible ecchymosis       no visible edema or effusion       Normal DP artery pulse       Normal PT artery pulse    ROM:        Grossly intact ROM with dorsiflexion, plantarflexion, inversion and eversion  Some anterior pain with inversion, eversion, PF    Strength:  Able to resist above motions, mild pain with eversion    Tender:       lateral ankle ligaments       dorsal tibiotalar joint    Special Tests:        anterior drawer         talar tilt   Pain with testing, some laxity with drawer        RADIOLOGY:  Final results and radiologist's interpretation, available in the [facility] health record.  Images were reviewed with the patient in the office today.  My " personal interpretation of the performed imaging: no acute bony abnormality noted. Mortise intact.          ANKLE THREE VIEWS RIGHT  10/23/2023 2:58 PM      HISTORY: Strain of right ankle, initial encounter; pain  COMPARISON: 1/25/2022                                                                      IMPRESSION: No acute fracture is identified. There is normal joint  spacing and alignment. The ankle mortise is congruent. The talar dome  is unremarkable.     RACH LONDON MD               ANKLE RIGHT THREE OR MORE VIEWS   1/25/2022 10:26 AM      HISTORY: Pain in joint, ankle and foot, right.     COMPARISON: None.                                                                      IMPRESSION: Normal joint spacing and alignment. No fracture. Mortise  and talar dome are intact.     OFELIA SALMERON MD             Review of prior external note(s) from - primary care Oct 2023  Review of the result(s) of each unique test - imaging  Independent interpretation of a test performed by another physician/other qualified health care professional (not separately reported) - imaging             Again, thank you for allowing me to participate in the care of your patient.        Sincerely,        Cristian Drew DO

## 2024-04-09 ASSESSMENT — ACTIVITIES OF DAILY LIVING (ADL)
SQUATTING: EXTREME DIFFICULTY OR UNABLE TO PERFORM ACTIVITY
RUNNING_ON_EVEN_GROUND: EXTREME DIFFICULTY OR UNABLE TO PERFORM ACTIVITY
PERFORMING_LIGHT_ACTIVITIES_AROUND_YOUR_HOME: QUITE A BIT OF DIFFICULTY
SHOPPING: EXTREME DIFFICULTY OR UNABLE TO PERFORM ACTIVITY
PUTTING_ON_YOUR_SHOES_OR_SOCKS: NO DIFFICULTY
LEFS_RAW_SCORE: 0
PLEASE_INDICATE_YOR_PRIMARY_REASON_FOR_REFERRAL_TO_THERAPY:: FOOT AND/OR ANKLE
GETTING_INTO_OR_OUT_OF_A_CAR: A LITTLE BIT OF DIFFICULTY
STANDING_FOR_1_HOUR: QUITE A BIT OF DIFFICULTY
MAKING_SHARP_TURNS_WHILE_RUNNING_FAST: EXTREME DIFFICULTY OR UNABLE TO PERFORM ACTIVITY
GETTING_INTO_AND_OUT_OF_A_BATH: NO DIFFICULTY
WALKING_2_BLOCKS: QUITE A BIT OF DIFFICULTY
YOUR_USUAL_HOBBIES,_RECREATIONAL_OR_SPORTING_ACTIVITIES: QUITE A BIT OF DIFFICULTY
LIFTING_AN_OBJECT,_LIKE_A_BAG_OF_GROCERIES_FROM_THE_FLOOR: NO DIFFICULTY
ANY_OF_YOUR_USUAL_WORK,_HOUSEWORK_OR_SCHOOL_ACTIVITIES: QUITE A BIT OF DIFFICULTY
LEFS_SCORE(%): 0
WALKING_A_MILE: QUITE A BIT OF DIFFICULTY
ROLLING_OVER_IN_BED: NO DIFFICULTY
GOING_UP_OR_DOWN_10_STAIRS: QUITE A BIT OF DIFFICULTY
PERFORMING_HEAVY_ACTIVITIES_AROUND_YOUR_HOME: QUITE A BIT OF DIFFICULTY
SITTING_FOR_1_HOUR: NO DIFFICULTY
RUNNING_ON_UNEVEN_GROUND: EXTREME DIFFICULTY OR UNABLE TO PERFORM ACTIVITY
WALKING_BETWEEN_ROOMS: MODERATE DIFFICULTY

## 2024-04-10 ENCOUNTER — THERAPY VISIT (OUTPATIENT)
Dept: PHYSICAL THERAPY | Facility: CLINIC | Age: 34
End: 2024-04-10
Attending: PEDIATRICS
Payer: COMMERCIAL

## 2024-04-10 DIAGNOSIS — S99.911S INJURY OF RIGHT ANKLE, SEQUELA: ICD-10-CM

## 2024-04-10 DIAGNOSIS — M25.571 PAIN IN JOINT, ANKLE AND FOOT, RIGHT: Primary | ICD-10-CM

## 2024-04-10 PROCEDURE — 97530 THERAPEUTIC ACTIVITIES: CPT | Mod: GP | Performed by: PHYSICAL THERAPIST

## 2024-04-10 PROCEDURE — 97161 PT EVAL LOW COMPLEX 20 MIN: CPT | Mod: GP | Performed by: PHYSICAL THERAPIST

## 2024-04-10 PROCEDURE — 97110 THERAPEUTIC EXERCISES: CPT | Mod: 59 | Performed by: PHYSICAL THERAPIST

## 2024-04-10 NOTE — PROGRESS NOTES
PHYSICAL THERAPY EVALUATION  Type of Visit: Evaluation    See electronic medical record for Abuse and Falls Screening details.    Subjective   Pain began in the fall of 2023 with insidious onset. Began having a sharp feeling pain in the front of her ankle (points to talocrural joint). 2 years prior she sustained an inversion sprain when she rolled her ankle and fell off of a stair. Had pain for a long time in lateral foot and ankle - this newer pain is different than her previous pain and is constant.         Presenting condition or subjective complaint: Ankle pain that varies from moderate to severe but is pretty constant. Has locked up and I've stumbled/fallen multiple times.  Relevant medical history: Anemia; Arthritis; Depression; History of fractures; Overweight; Sleep disorder like apnea; Smoking   Dates & types of surgery: Right shoulder hemiarthroplasty, July 2015    Prior diagnostic imaging/testing results: X-ray     Normal joint spacing and alignment. No fracture. Mortise  and talar dome are intact.  Prior therapy history for the same diagnosis, illness or injury: No      Living Environment  Social support: With a significant other or spouse   Type of home: House; 2-story; Basement   Stairs to enter the home: No       Ramp: No   Stairs inside the home: Yes 26 Is there a railing: Yes   Help at home: Home management tasks (cooking, cleaning); Home and Yard maintenance tasks; Emergency call system  Equipment owned:       Employment: Yes   Hobbies/Interests: Art, reading, walks with family and dog    Patient goals for therapy: I would like to not wear a boot and be able to do my job without being in pain. Involuntary limping has begun to affect my tailbone/back.    Pain assessment: See objective evaluation for additional pain details     Objective   FOOT/ANKLE EVALUATION  PAIN: Pain Level at Rest: 5/10  Pain Level with Use: 8/10  Pain Location: ankle and foot   Pain Quality: Exhausting, Miserable, and  Sharp  Pain Frequency: constant  Pain is Worst: no pattern  Pain is Exacerbated By: walking, standing, stairs  Pain is Relieved By: cold, NSAIDs, otc medications, use, boot, and rest  Pain Progression: Worsened  INTEGUMENTARY (edema, incisions): WNL  POSTURE: Standing Posture: Lordosis decreased  GAIT:   Weightbearing Status: WBAT  Assistive Device(s):  uses CAM boot at work, arrives to clinic in good supportive footwear   Gait Deviations: Stride length decreased  Inga decreased  BALANCE/PROPRIOCEPTION:  NT today  WEIGHT BEARING ALIGNMENT: Foot/Ankle WB Alignment:Pes planus L, Pes planus R  NON-WEIGHTBEARING ALIGNMENT: WNL   ROM: AROM WNL    STRENGTH: WNL  FLEXIBILITY:  decreased at R gastroc, hamstrings, evertors   SPECIAL TESTS:  some laxity with anterior drawer and painful  FUNCTIONAL TESTS: Double Leg Squat: Anterior knee translation, Knee valgus, Hip internal rotation, and Improper use of glutes/hips  PALPATION:  tender distal tib anterior, ankle evertors  JOINT MOBILITY:  hypomobile Tib/fib joint, mid tarsals    Assessment & Plan   CLINICAL IMPRESSIONS  Medical Diagnosis: R ankle pain    Treatment Diagnosis: R ankle pain   Impression/Assessment: Patient is a 33 year old female with R ankle pain complaints.  The following significant findings have been identified: Pain, Decreased ROM/flexibility, Decreased joint mobility, Decreased strength, Decreased proprioception, and Impaired gait. These impairments interfere with their ability to perform self care tasks, work tasks, recreational activities, household chores, driving , household mobility, and community mobility as compared to previous level of function.     Clinical Decision Making (Complexity):  Clinical Presentation: Stable/Uncomplicated  Clinical Presentation Rationale: based on medical and personal factors listed in PT evaluation  Clinical Decision Making (Complexity): Low complexity    PLAN OF CARE  Treatment Interventions:  Interventions: Gait  Training, Manual Therapy, Neuromuscular Re-education, Therapeutic Activity, Therapeutic Exercise    Long Term Goals     PT Goal 1  Goal Identifier: Ambulation  Goal Description: Pt will be able to ambulate without boot, with 0/10 pain  Rationale: to maximize safety and independence with performance of ADLs and functional tasks;to maximize safety and independence within the home;to maximize safety and independence within the community  Goal Progress: ambulates in boot with 6-8/10 pain  Target Date: 06/05/24      Frequency of Treatment: 1x/week  Duration of Treatment: 8 weeks    Recommended Referrals to Other Professionals:   Education Assessment:   Learner/Method: Patient    Risks and benefits of evaluation/treatment have been explained.   Patient/Family/caregiver agrees with Plan of Care.     Evaluation Time:     PT Eval, Low Complexity Minutes (32041): 20       Signing Clinician: Neyda Gordon PT, DPT, OCS      [facility]                                                                                   OUTPATIENT PHYSICAL THERAPY      PLAN OF TREATMENT FOR OUTPATIENT REHABILITATION   Patient's Last Name, First Name, Karla Jenkins YOB: 1990   Provider's Name   [facility]   Medical Record No.  [id number]     Onset Date:    Start of Care Date: 04/10/24     Medical Diagnosis:  R ankle pain      PT Treatment Diagnosis:  R ankle pain Plan of Treatment  Frequency/Duration: 1x/week/ 8 weeks    Certification date from 04/10/24 to 06/05/24         See note for plan of treatment details and functional goals     Neyda Gordon PT                         I CERTIFY THE NEED FOR THESE SERVICES FURNISHED UNDER        THIS PLAN OF TREATMENT AND WHILE UNDER MY CARE     (Physician attestation of this document indicates review and certification of the therapy plan).              Referring Provider:  DO Ginger Hercules  Assessment  See Epic Evaluation- Start of Care Date: 04/10/24

## 2024-04-18 ENCOUNTER — THERAPY VISIT (OUTPATIENT)
Dept: PHYSICAL THERAPY | Facility: CLINIC | Age: 34
End: 2024-04-18
Attending: PEDIATRICS
Payer: COMMERCIAL

## 2024-04-18 DIAGNOSIS — M25.571 PAIN IN JOINT, ANKLE AND FOOT, RIGHT: Primary | ICD-10-CM

## 2024-04-18 PROCEDURE — 97140 MANUAL THERAPY 1/> REGIONS: CPT | Mod: GP

## 2024-04-18 PROCEDURE — 97110 THERAPEUTIC EXERCISES: CPT | Mod: GP

## 2024-04-25 ENCOUNTER — THERAPY VISIT (OUTPATIENT)
Dept: PHYSICAL THERAPY | Facility: CLINIC | Age: 34
End: 2024-04-25
Payer: COMMERCIAL

## 2024-04-25 DIAGNOSIS — M25.571 PAIN IN JOINT, ANKLE AND FOOT, RIGHT: Primary | ICD-10-CM

## 2024-04-25 PROCEDURE — 97110 THERAPEUTIC EXERCISES: CPT | Mod: GP

## 2024-04-25 PROCEDURE — 97530 THERAPEUTIC ACTIVITIES: CPT | Mod: GP

## 2024-04-25 PROCEDURE — 97140 MANUAL THERAPY 1/> REGIONS: CPT | Mod: GP

## 2024-05-03 ENCOUNTER — THERAPY VISIT (OUTPATIENT)
Dept: PHYSICAL THERAPY | Facility: CLINIC | Age: 34
End: 2024-05-03
Payer: COMMERCIAL

## 2024-05-03 DIAGNOSIS — M25.571 PAIN IN JOINT, ANKLE AND FOOT, RIGHT: Primary | ICD-10-CM

## 2024-05-03 PROCEDURE — 97140 MANUAL THERAPY 1/> REGIONS: CPT | Mod: GP | Performed by: PHYSICAL THERAPIST

## 2024-05-03 PROCEDURE — 97110 THERAPEUTIC EXERCISES: CPT | Mod: GP | Performed by: PHYSICAL THERAPIST

## 2024-05-10 ENCOUNTER — THERAPY VISIT (OUTPATIENT)
Dept: PHYSICAL THERAPY | Facility: CLINIC | Age: 34
End: 2024-05-10
Payer: COMMERCIAL

## 2024-05-10 DIAGNOSIS — M25.571 PAIN IN JOINT, ANKLE AND FOOT, RIGHT: Primary | ICD-10-CM

## 2024-05-10 PROCEDURE — 97110 THERAPEUTIC EXERCISES: CPT | Mod: GP | Performed by: PHYSICAL THERAPIST

## 2024-05-10 PROCEDURE — 97140 MANUAL THERAPY 1/> REGIONS: CPT | Mod: GP | Performed by: PHYSICAL THERAPIST

## 2024-05-15 ENCOUNTER — THERAPY VISIT (OUTPATIENT)
Dept: PHYSICAL THERAPY | Facility: CLINIC | Age: 34
End: 2024-05-15
Payer: COMMERCIAL

## 2024-05-15 DIAGNOSIS — M25.571 PAIN IN JOINT, ANKLE AND FOOT, RIGHT: Primary | ICD-10-CM

## 2024-05-15 PROCEDURE — 97140 MANUAL THERAPY 1/> REGIONS: CPT | Mod: GP | Performed by: PHYSICAL THERAPIST

## 2024-05-15 PROCEDURE — 97110 THERAPEUTIC EXERCISES: CPT | Mod: GP | Performed by: PHYSICAL THERAPIST

## 2024-05-21 ENCOUNTER — THERAPY VISIT (OUTPATIENT)
Dept: PHYSICAL THERAPY | Facility: CLINIC | Age: 34
End: 2024-05-21
Payer: COMMERCIAL

## 2024-05-21 DIAGNOSIS — M25.571 PAIN IN JOINT, ANKLE AND FOOT, RIGHT: Primary | ICD-10-CM

## 2024-05-21 PROCEDURE — 97140 MANUAL THERAPY 1/> REGIONS: CPT | Mod: GP | Performed by: PHYSICAL THERAPIST

## 2024-05-21 PROCEDURE — 97112 NEUROMUSCULAR REEDUCATION: CPT | Mod: GP | Performed by: PHYSICAL THERAPIST

## 2024-05-21 PROCEDURE — 97110 THERAPEUTIC EXERCISES: CPT | Mod: GP | Performed by: PHYSICAL THERAPIST

## 2024-05-23 ENCOUNTER — PATIENT OUTREACH (OUTPATIENT)
Dept: CARE COORDINATION | Facility: CLINIC | Age: 34
End: 2024-05-23
Payer: COMMERCIAL

## 2024-06-03 ENCOUNTER — THERAPY VISIT (OUTPATIENT)
Dept: PHYSICAL THERAPY | Facility: CLINIC | Age: 34
End: 2024-06-03
Payer: COMMERCIAL

## 2024-06-03 DIAGNOSIS — M25.571 PAIN IN JOINT, ANKLE AND FOOT, RIGHT: Primary | ICD-10-CM

## 2024-06-03 PROCEDURE — 97112 NEUROMUSCULAR REEDUCATION: CPT | Mod: GP | Performed by: PHYSICAL THERAPIST

## 2024-06-03 PROCEDURE — 97110 THERAPEUTIC EXERCISES: CPT | Mod: GP | Performed by: PHYSICAL THERAPIST

## 2024-06-03 PROCEDURE — 97140 MANUAL THERAPY 1/> REGIONS: CPT | Mod: GP | Performed by: PHYSICAL THERAPIST

## 2024-06-06 ENCOUNTER — PATIENT OUTREACH (OUTPATIENT)
Dept: CARE COORDINATION | Facility: CLINIC | Age: 34
End: 2024-06-06
Payer: COMMERCIAL

## 2024-06-13 ENCOUNTER — THERAPY VISIT (OUTPATIENT)
Dept: PHYSICAL THERAPY | Facility: CLINIC | Age: 34
End: 2024-06-13
Payer: COMMERCIAL

## 2024-06-13 DIAGNOSIS — M25.571 PAIN IN JOINT, ANKLE AND FOOT, RIGHT: Primary | ICD-10-CM

## 2024-06-13 PROCEDURE — 97140 MANUAL THERAPY 1/> REGIONS: CPT | Mod: GP | Performed by: PHYSICAL THERAPIST

## 2024-06-13 PROCEDURE — 97110 THERAPEUTIC EXERCISES: CPT | Mod: GP | Performed by: PHYSICAL THERAPIST

## 2024-06-13 NOTE — PROGRESS NOTES
"   06/13/24 0500   Appointment Info   Signing clinician's name / credentials Neyda Gordon PT   Total/Authorized Visits 8 (PT)   Visits Used 8   Medical Diagnosis R ankle pain   PT Tx Diagnosis R ankle pain   Quick Adds Certification   Progress Note/Certification   Start of Care Date 04/10/24   Therapy Frequency 2x/month   Predicted Duration 2 months   Certification date from 06/06/24   Certification date to 08/08/24   Progress Note Completed Date 04/10/24   PT Goal 1   Goal Identifier Ambulation   Goal Description Pt will be able to ambulate without boot, with 0/10 pain   Rationale to maximize safety and independence with performance of ADLs and functional tasks;to maximize safety and independence within the home;to maximize safety and independence within the community   Goal Progress Pt able to ambulate without boot for 2 hours with 0/10 pain   Target Date 08/08/24   Subjective Report   Subjective Report Is able to work long shifts without boot - min pain occassionally but continues to improve. Had a 3 hour car ride this weekend and felt pretty stiff.   Objective Measure 1   Objective Measure Ankle ROM   Details WNL and pain free in all planes   Objective Measure 2   Objective Measure MMT   Details DF 5/5, PF 4+/5, EV 4+/5   Treatment Interventions (PT)   Interventions Therapeutic Procedure/Exercise;Therapeutic Activity;Neuromuscular Re-education;Manual Therapy   Therapeutic Procedure/Exercise   Therapeutic Procedures: strength, endurance, ROM, flexibility minutes (53795) 25   Therapeutic Procedures Ther Proc 2   Ther Proc 1 Standing Alternating Plantarflexion and Dorsiflexion   Ther Proc 1 - Details  each motion in sitting initially - able to progress x10 ea in standing   PTRx Ther Proc 1 Standing Gastroc Stretch   PTRx Ther Proc 1 - Details verbal review - still feels   PTRx Ther Proc 2 Plantar Fascia Stretch Fold and Hold   PTRx Ther Proc 2 - Details x10-15\" hold   PTRx Ther Proc 3 Seated Hamstring " "Stretch   PTRx Ther Proc 3 - Details x10-15\" hold   PTRx Ther Proc 4 Side Stepping With Theraband   PTRx Ther Proc 4 - Details red band under feet 20 ft x 4   PTRx Ther Proc 5 Heel Toe Walking No Support   PTRx Ther Proc 5 - Details x15   PTRx Ther Proc 6 ladder series: fwd/fwd/back, in in out out, icky shuffle, side step   PTRx Ther Proc 6 - Details x 2 lengths each   Manual Therapy   Manual Therapy: Mobilization, MFR, MLD, friction massage minutes (19993) 8   Manual Therapy 1 Manual distraction to R talocrural joint - GIV to GIII; one GV manip completed - educated and informed patient on benefits and potential side effects - received consent prior to execution - no noted cavitation, but improved joint play following   Manual Therapy 1 - Details STM to R gastroc   Patient Response/Progress Noted improved joint play following techniques - still limited compared to L LE   Education   Learner/Method Patient   Plan   Home program PTRX online   Plan for next session progress strength/proprio   Total Session Time   Timed Code Treatment Minutes 40   Total Treatment Time (sum of timed and untimed services) 40         [facility]                                                                                   OUTPATIENT PHYSICAL THERAPY    PLAN OF TREATMENT FOR OUTPATIENT REHABILITATION   Patient's Last Name, First Name, Karla Jenkins YOB: 1990   Provider's Name   [facility]   Medical Record No.  [id number]     Onset Date:    Start of Care Date: 04/10/24     Medical Diagnosis:  R ankle pain      PT Treatment Diagnosis:  R ankle pain Plan of Treatment  Frequency/Duration: 2x/month/ 2 months    Certification date from 06/06/24 to 08/08/24         See note for plan of treatment details and functional goals     Neyda Gordon, PT                         I CERTIFY THE NEED FOR THESE SERVICES FURNISHED UNDER        THIS PLAN OF TREATMENT " AND WHILE UNDER MY CARE     (Physician attestation of this document indicates review and certification of the therapy plan).              Referring Provider:  Cristian Drew    Initial Assessment  See Epic Evaluation- Start of Care Date: 04/10/24            PLAN  Continue therapy per current plan of care.    Beginning/End Dates of Progress Note Reporting Period:  04/10/24 to 06/13/2024    Referring Provider:  Cristian Drew

## 2024-06-18 ENCOUNTER — THERAPY VISIT (OUTPATIENT)
Dept: PHYSICAL THERAPY | Facility: CLINIC | Age: 34
End: 2024-06-18
Payer: COMMERCIAL

## 2024-06-18 DIAGNOSIS — M25.571 PAIN IN JOINT, ANKLE AND FOOT, RIGHT: Primary | ICD-10-CM

## 2024-06-18 PROCEDURE — 97110 THERAPEUTIC EXERCISES: CPT | Mod: GP | Performed by: PHYSICAL THERAPIST

## 2024-06-18 PROCEDURE — 97140 MANUAL THERAPY 1/> REGIONS: CPT | Mod: GP | Performed by: PHYSICAL THERAPIST

## 2024-06-21 ENCOUNTER — OFFICE VISIT (OUTPATIENT)
Dept: DERMATOLOGY | Facility: CLINIC | Age: 34
End: 2024-06-21
Attending: STUDENT IN AN ORGANIZED HEALTH CARE EDUCATION/TRAINING PROGRAM
Payer: COMMERCIAL

## 2024-06-21 DIAGNOSIS — Z80.8 FAMILY HISTORY OF SKIN CANCER: ICD-10-CM

## 2024-06-21 DIAGNOSIS — Z12.83 ENCOUNTER FOR SCREENING FOR MALIGNANT NEOPLASM OF SKIN: ICD-10-CM

## 2024-06-21 DIAGNOSIS — L81.4 LENTIGINES: ICD-10-CM

## 2024-06-21 DIAGNOSIS — D22.9 MULTIPLE BENIGN NEVI: Primary | ICD-10-CM

## 2024-06-21 DIAGNOSIS — D18.01 CHERRY ANGIOMA: ICD-10-CM

## 2024-06-21 DIAGNOSIS — D23.9 DERMATOFIBROMA: ICD-10-CM

## 2024-06-21 PROCEDURE — 99203 OFFICE O/P NEW LOW 30 MIN: CPT | Performed by: NURSE PRACTITIONER

## 2024-06-21 NOTE — PROGRESS NOTES
[facility] Dermatology Note  Encounter Date: Jun 21, 2024  Office Visit     Reviewed patients past medical history and pertinent chart review prior to patients visit today.     Dermatology Problem List:  Patient denies personal history of skin cancer or dysplastic nevi.    Family history of skin cancer in her mother and uncle, unknown type    ____________________________________________    Assessment & Plan:     # Benign skin findings including: dermatofibroma, cherry angioma, lentigines and benign nevi.   - No further intervention required. Patient to report changes.   - Patient reassured of the benign nature of these lesions.    #Signs and Symptoms of non-melanoma skin cancer and ABCDEs of melanoma reviewed with patient. Patient encouraged to perform monthly self skin exams and educated on how to perform them. UV precautions reviewed with patient. Patient was asked about new or changing moles/lesions on body.     #Reviewed Sunscreen: Apply 20 minutes prior to going outdoors and reapply every two hours, when wet or sweating. We recommend using an SPF 30 or higher, and to use one that is water resistant.       Follow-up:  1-2 years for follow up full body skin exam, prn for new or changing lesions or new concerns    Loraine White CNP  Dermatology     ____________________________________________    CC: Skin Check (Full-no concerns )    HPI:  Ms. Karla Gordon is a(n) 33 year old female who presents today as a new patient for a full body skin cancer screening. Patient has no specific concerns today.     Patient is otherwise feeling well, without additional skin concerns.     Physical Exam:  Vitals: There were no vitals taken for this visit.  SKIN: Total skin excluding the genitalia areas was performed. The exam included the head/face, neck, both arms, chest, back, abdomen, both legs, digits, mons pubis, buttock and nails.   -right calf, pink firm papule with stellate center on dermoscopy  and positive dimples sign   -several 1-2mm red dome shaped symmetric papules scattered on the trunk  -multiple tan/brown flat round macules and raised papules scattered throughout trunk, extremities and head. No worrisome features for malignancy noted on examination.  -scattered tan, homogenous macules scattered on sun exposed areas of trunk, extremities and face.     - No other lesions of concern on areas examined.     Medications:  Current Outpatient Medications   Medication Sig Dispense Refill    acetaminophen (TYLENOL) 325 MG tablet Take 2 tablets (650 mg) by mouth every 6 hours as needed for mild pain Start after Delivery. 100 tablet 0    BIOTIN PO Take 1 tablet by mouth daily      hydrOXYzine HCl (ATARAX) 25 MG tablet TAKE 1 TABLET(25 MG) BY MOUTH TWICE DAILY AS NEEDED FOR ANXIETY OR SLEEP. MAY ALSO TAKE AT BEDTIME AS NEEDED FOR SLEEP 90 tablet 3    Multiple Vitamins-Minerals (CENTRUM ULTRA WOMENS PO) Take 1 tablet by mouth daily      norelgestromin-ethinyl estradiol (ZAFEMY) 150-35 MCG/24HR patch REMOVE OLD PATCH AND APPLY NEW PATCH ONTO THE SKIN ONCE A WEEK FOR 3 WEEKS. DO NOT WEAR PATCH WEEK 4, THEN REPEAT. 9 patch 3    sertraline (ZOLOFT) 50 MG tablet Take 1 tablet (50 mg) by mouth daily 90 tablet 3    SUMAtriptan (IMITREX) 25 MG tablet Take 1 tablet (25 mg) by mouth at onset of headache for migraine May repeat in 2 hours. Max 8 tablets/24 hours. (Patient not taking: Reported on 6/21/2024) 10 tablet 0     No current facility-administered medications for this visit.      Past Medical History:   Patient Active Problem List   Diagnosis    Numbness and tingling of foot    Tinnitus, bilateral    Tobacco use disorder    Thrombocytopenia (H24)    Fatty liver    Macrocytic anemia    Alcohol use disorder, moderate, dependence (H)    Dietary folate deficiency anemia    Alcoholic hepatitis with ascites (H28)    Liver lesion    Lung nodule    Lymphadenopathy    Mild mitral regurgitation by prior echocardiogram     Cervical high risk HPV (human papillomavirus) test positive    Gestational diabetes    Liver disease, chronic, due to alcohol (H24)    History of alcohol dependence (H)    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     Dietary folate deficiency anemia 11/20/2018    Fatty liver 3/22/2018    3/21/18:  ALT 76,  (4.2 xULN).   4/2/18:  Hepatitis B and C negative  Patient also with thrombocytopenia and mild macrocytic anemia  11/16/18:  IMPRESSION:     1. Increased hepatic echogenicity as can be seen in intrinsic parenchymal disease such as steatosis. 2. Liver is enlarged, measuring 21.6 cm. 3. Spleen is borderline enlarged, measuring 1.9 cm    Macrocytic anemia 4/3/2018    Thrombocytopenia (H24) 3/22/2018    3/21/18:  Platelets noted at 92.   4/2/18:  Platelets 92.  Mild macrocytic anemia.  Peripheral smear pending, consider Hematology referral    Tobacco use disorder 3/21/2018       CC Dara Bustamante,   [address] [place] [address] on close of this encounter.

## 2024-06-21 NOTE — LETTER
6/21/2024      Karla Gordon  [address] [address]      Dear Colleague,    Thank you for referring your patient, Karla Gordon, to the [facility]. Please see a copy of my visit note below.    [facility] Dermatology Note  Encounter Date: Jun 21, 2024  Office Visit     Reviewed patients past medical history and pertinent chart review prior to patients visit today.     Dermatology Problem List:  Patient denies personal history of skin cancer or dysplastic nevi.    Family history of skin cancer in her mother and uncle, unknown type    ____________________________________________    Assessment & Plan:     # Benign skin findings including: dermatofibroma, cherry angioma, lentigines and benign nevi.   - No further intervention required. Patient to report changes.   - Patient reassured of the benign nature of these lesions.    #Signs and Symptoms of non-melanoma skin cancer and ABCDEs of melanoma reviewed with patient. Patient encouraged to perform monthly self skin exams and educated on how to perform them. UV precautions reviewed with patient. Patient was asked about new or changing moles/lesions on body.     #Reviewed Sunscreen: Apply 20 minutes prior to going outdoors and reapply every two hours, when wet or sweating. We recommend using an SPF 30 or higher, and to use one that is water resistant.       Follow-up:  1-2 years for follow up full body skin exam, prn for new or changing lesions or new concerns    Loraine White CNP  Dermatology     ____________________________________________    CC: Skin Check (Full-no concerns )    HPI:  Ms. Karla Gordon is a(n) 33 year old female who presents today as a new patient for a full body skin cancer screening. Patient has no specific concerns today.     Patient is otherwise feeling well, without additional skin concerns.     Physical Exam:  Vitals: There were no vitals taken for this  visit.  SKIN: Total skin excluding the genitalia areas was performed. The exam included the head/face, neck, both arms, chest, back, abdomen, both legs, digits, mons pubis, buttock and nails.   -right calf, pink firm papule with stellate center on dermoscopy and positive dimples sign   -several 1-2mm red dome shaped symmetric papules scattered on the trunk  -multiple tan/brown flat round macules and raised papules scattered throughout trunk, extremities and head. No worrisome features for malignancy noted on examination.  -scattered tan, homogenous macules scattered on sun exposed areas of trunk, extremities and face.     - No other lesions of concern on areas examined.     Medications:  Current Outpatient Medications   Medication Sig Dispense Refill     acetaminophen (TYLENOL) 325 MG tablet Take 2 tablets (650 mg) by mouth every 6 hours as needed for mild pain Start after Delivery. 100 tablet 0     BIOTIN PO Take 1 tablet by mouth daily       hydrOXYzine HCl (ATARAX) 25 MG tablet TAKE 1 TABLET(25 MG) BY MOUTH TWICE DAILY AS NEEDED FOR ANXIETY OR SLEEP. MAY ALSO TAKE AT BEDTIME AS NEEDED FOR SLEEP 90 tablet 3     Multiple Vitamins-Minerals (CENTRUM ULTRA WOMENS PO) Take 1 tablet by mouth daily       norelgestromin-ethinyl estradiol (ZAFEMY) 150-35 MCG/24HR patch REMOVE OLD PATCH AND APPLY NEW PATCH ONTO THE SKIN ONCE A WEEK FOR 3 WEEKS. DO NOT WEAR PATCH WEEK 4, THEN REPEAT. 9 patch 3     sertraline (ZOLOFT) 50 MG tablet Take 1 tablet (50 mg) by mouth daily 90 tablet 3     SUMAtriptan (IMITREX) 25 MG tablet Take 1 tablet (25 mg) by mouth at onset of headache for migraine May repeat in 2 hours. Max 8 tablets/24 hours. (Patient not taking: Reported on 6/21/2024) 10 tablet 0     No current facility-administered medications for this visit.      Past Medical History:   Patient Active Problem List   Diagnosis     Numbness and tingling of foot     Tinnitus, bilateral     Tobacco use disorder     Thrombocytopenia (H24)      Fatty liver     Macrocytic anemia     Alcohol use disorder, moderate, dependence (H)     Dietary folate deficiency anemia     Alcoholic hepatitis with ascites (H28)     Liver lesion     Lung nodule     Lymphadenopathy     Mild mitral regurgitation by prior echocardiogram     Cervical high risk HPV (human papillomavirus) test positive     Gestational diabetes     Liver disease, chronic, due to alcohol (H24)     History of alcohol dependence (H)     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      Dietary folate deficiency anemia 11/20/2018     Fatty liver 3/22/2018    3/21/18:  ALT 76,  (4.2 xULN).   4/2/18:  Hepatitis B and C negative  Patient also with thrombocytopenia and mild macrocytic anemia  11/16/18:  IMPRESSION:     1. Increased hepatic echogenicity as can be seen in intrinsic parenchymal disease such as steatosis. 2. Liver is enlarged, measuring 21.6 cm. 3. Spleen is borderline enlarged, measuring 1.9 cm     Macrocytic anemia 4/3/2018     Thrombocytopenia (H24) 3/22/2018    3/21/18:  Platelets noted at 92.   4/2/18:  Platelets 92.  Mild macrocytic anemia.  Peripheral smear pending, consider Hematology referral     Tobacco use disorder 3/21/2018       CC Dara Bustamante, DO  [address] [place] [address] on close of this encounter.      Again, thank you for allowing me to participate in the care of your patient.        Sincerely,        Evonne White, DASHAWN CNP

## 2024-07-30 ENCOUNTER — THERAPY VISIT (OUTPATIENT)
Dept: PHYSICAL THERAPY | Facility: CLINIC | Age: 34
End: 2024-07-30
Payer: COMMERCIAL

## 2024-07-30 DIAGNOSIS — M25.571 PAIN IN JOINT, ANKLE AND FOOT, RIGHT: Primary | ICD-10-CM

## 2024-07-30 PROCEDURE — 97110 THERAPEUTIC EXERCISES: CPT | Mod: GP | Performed by: PHYSICAL THERAPIST

## 2024-07-30 PROCEDURE — 97112 NEUROMUSCULAR REEDUCATION: CPT | Mod: GP | Performed by: PHYSICAL THERAPIST

## 2024-08-13 ENCOUNTER — THERAPY VISIT (OUTPATIENT)
Dept: PHYSICAL THERAPY | Facility: CLINIC | Age: 34
End: 2024-08-13
Payer: COMMERCIAL

## 2024-08-13 DIAGNOSIS — M25.571 PAIN IN JOINT, ANKLE AND FOOT, RIGHT: Primary | ICD-10-CM

## 2024-08-13 PROCEDURE — 97110 THERAPEUTIC EXERCISES: CPT | Mod: GP | Performed by: PHYSICAL THERAPIST

## 2024-08-13 PROCEDURE — 97140 MANUAL THERAPY 1/> REGIONS: CPT | Mod: GP | Performed by: PHYSICAL THERAPIST

## 2024-08-13 NOTE — PROGRESS NOTES
08/13/24 0500   Appointment Info   Signing clinician's name / credentials Neyda Gordon, PT   Total/Authorized Visits 8 (PT)   Visits Used 11   Medical Diagnosis R ankle pain   PT Tx Diagnosis R ankle pain   Quick Adds Certification   Progress Note/Certification   Start of Care Date 04/10/24   Therapy Frequency 2x/month   Predicted Duration 2 months   Certification date from 08/09/24   Certification date to 10/04/24   Progress Note Completed Date 06/06/24   PT Goal 1   Goal Identifier Ambulation   Goal Description Pt will be able to ambulate without boot for 1 hour, with 0/10 pain   Rationale to maximize safety and independence with performance of ADLs and functional tasks;to maximize safety and independence within the home;to maximize safety and independence within the community   Goal Progress 3-5/10 pain with 1 hour ambulation, goal extended   Target Date 10/04/24   Subjective Report   Subjective Report Was a little more sore these last couple weeks than expected. Walked in a parade for an hour and was more sore. Also had worked that morning. Was more sore the next day. Has been feeling twinges on the outside and front of knee - started last week. Feels it when walking.   Objective Measures   Objective Measures Objective Measure 1;Objective Measure 2;Objective Measure 3   Objective Measure 1   Objective Measure Palpation   Details tender ankle evertors R   Objective Measure 2   Objective Measure Patellar mobility   Details hypomobile med/lat R   Objective Measure 3   Objective Measure MMT   Details R WNL, L eversion 4+/5, all others WNL   Treatment Interventions (PT)   Interventions Therapeutic Procedure/Exercise;Therapeutic Activity;Neuromuscular Re-education;Manual Therapy   Therapeutic Procedure/Exercise   Therapeutic Procedures: strength, endurance, ROM, flexibility minutes (70802) 25   Therapeutic Procedures Ther Proc 2   Ther Proc 1 Standing Alternating Plantarflexion and Dorsiflexion   Ther Proc 1 -  "Details x15   Ther Proc 2 SAQ x 15 - not fatiguing   PTRx Ther Proc 1 Standing Gastroc Stretch   PTRx Ther Proc 1 - Details verbal review - still feels good stretch   PTRx Ther Proc 2 Seated Hamstring Stretch   PTRx Ther Proc 2 - Details x10-15\" hold   PTRx Ther Proc 3 Ankle Eversion Strengthening With Theraband   PTRx Ther Proc 3 - Details x15 YTB, faituging   PTRx Ther Proc 4 Iliotibial Band Stretch Supine   PTRx Ther Proc 4 - Details x10-15\" hold, feels big stretch   PTRx Ther Proc 5 bike x 5 min warmup   PTRx Ther Proc 5 - Details iso eversion x 15   Manual Therapy   Manual Therapy: Mobilization, MFR, MLD, friction massage minutes (54535) 10   Manual Therapy 1 - Details STM to R everters, med/lat patellar glides   Patient Response/Progress Noted improved joint play following techniques   Education   Learner/Method Patient   Plan   Home program PTRX online   Plan for next session progress strength/proprio         [facility]                                                                                   OUTPATIENT PHYSICAL THERAPY    PLAN OF TREATMENT FOR OUTPATIENT REHABILITATION   Patient's Last Name, First Name, KOLBY GordonKarla YOB: 1990   Provider's Name   [facility]   Medical Record No.  [id number]     Onset Date:    Start of Care Date: 04/10/24     Medical Diagnosis:  R ankle pain      PT Treatment Diagnosis:  R ankle pain Plan of Treatment  Frequency/Duration: 2x/month/ 2 months    Certification date from 08/09/24 to 10/04/24         See note for plan of treatment details and functional goals     Neyda Gordon, PT                         I CERTIFY THE NEED FOR THESE SERVICES FURNISHED UNDER        THIS PLAN OF TREATMENT AND WHILE UNDER MY CARE     (Physician attestation of this document indicates review and certification of the therapy plan).              Referring Provider:  Cristian Drew    Initial " Assessment  See Epic Evaluation- Start of Care Date: 04/10/24            PLAN  Continue therapy per current plan of care.    Beginning/End Dates of Progress Note Reporting Period:  06/06/24 to 08/13/2024    Referring Provider:  Cristian Drew

## 2024-08-27 ENCOUNTER — THERAPY VISIT (OUTPATIENT)
Dept: PHYSICAL THERAPY | Facility: CLINIC | Age: 34
End: 2024-08-27
Payer: COMMERCIAL

## 2024-08-27 DIAGNOSIS — M25.571 PAIN IN JOINT, ANKLE AND FOOT, RIGHT: Primary | ICD-10-CM

## 2024-08-27 PROCEDURE — 97110 THERAPEUTIC EXERCISES: CPT | Mod: GP | Performed by: PHYSICAL THERAPIST

## 2024-08-27 PROCEDURE — 97140 MANUAL THERAPY 1/> REGIONS: CPT | Mod: GP | Performed by: PHYSICAL THERAPIST

## 2024-09-22 ENCOUNTER — HEALTH MAINTENANCE LETTER (OUTPATIENT)
Age: 34
End: 2024-09-22

## 2024-12-17 ENCOUNTER — PATIENT OUTREACH (OUTPATIENT)
Dept: CARE COORDINATION | Facility: CLINIC | Age: 34
End: 2024-12-17
Payer: COMMERCIAL

## 2025-01-11 NOTE — PROGRESS NOTES
ASSESSMENT & PLAN    Emily was seen today for pain.    Diagnoses and all orders for this visit:    Greater trochanteric pain syndrome of both lower extremities  -     Large Joint Injection/Arthocentesis: bilateral greater trochanteric bursa  -     Physical Therapy  Referral; Future    Chronic right hip pain  -     XR Pelvis and Hip Right 1 View; Future  -     Large Joint Injection/Arthocentesis: bilateral greater trochanteric bursa  -     Physical Therapy  Referral; Future      Chronic issue, worsening with time.  Discussed PT, injection to start. Done today.  See below.  Questions answered. Discussed signs and symptoms that may indicate more serious issues; the patient was instructed to seek appropriate care if noted. Emily indicates understanding of these issues and agrees with the plan.        See Patient Instructions  Patient Instructions   Bilateral lateral hip pain consistent with lateral source, discussed gluteal tendon involvement, potential for bursitis. Does not appear to be hip joint source, which is good.  Discussed considerations around imaging, physical therapy, steroid injection to start.  Following discussion, physical therapy referral placed.  Bilateral lateral hip steroid injections done today.  Monitor at least 1 month with physical therapy, and if improving, then follow-up is as needed.    If you have any further questions for your physician or physician s care team you can contact them thru Viewpostt or by calling [phone].          FSOC Injection Discharge Instructions    You may shower, however avoid swimming, tub baths or hot tubs for 24 hours following your procedure  You may have a mild to moderate increase in pain for several days following the injection.  It may take up to 14 days for the steroid medication to start working although you may feel the effect as early as a few days after the procedure.  You may use ice packs for 10-15 minutes, 3 to 4 times a day at the  injection site for comfort  You may use anti-inflammatory medications (such as Ibuprofen or Aleve or Advil) or Tylenol for pain control if necessary  If you were fasting, you may resume your normal diet and medications after the procedure  If you have diabetes, check your blood sugar more frequently than usual as your blood sugar may be higher than normal for 10-14 days following a steroid injection. Contact your doctor who manages your diabetes if your blood sugar is higher than usual    If you experience any of the following, call [facility] @ [phone]  -Fever over 100 degree F  -Swelling, bleeding, redness, drainage, warmth at the injection site  - New or worsening pain     [facility] SAMANTHA    -----  Chief Complaint   Patient presents with    Right Hip - Pain       SUBJECTIVE  Karla Gordon is a/an 34 year old female who is seen as a self referral for evaluation of right hip.     The patient is seen by themselves.    Onset: 1+ years(s) ago. Reports insidious onset without acute precipitating event. Right leg dominant  Location of Pain: right hip, posterior/lateral right hip and tailbone, does note that left is similar with location, not as bad  Worsened by: Sleeping on the side, some days worse than others  Better with:   Treatments tried: Home stretching, icyhot, tylenol/ibuprofen  Associated symptoms: clicking of the lower back    Orthopedic/Surgical history: NO  Social History/Occupation:  and     **  Above information per rooming staff.  Additional history:  Pain lateral hip, R > L. Acetaminophen and ibuprofen generally helpful.  Generally constant pain.  No clicking/popping in hip area, but can get in low back near midline. No pain associated.      REVIEW OF SYSTEMS:  Review of Systems    OBJECTIVE:           Low back exam:    ROM: flexion hands to ankles, no change in pain    Straight leg raise negative        Bilateral hip  exam    Inspection:      no edema or ecchymosis in hip area    ROM:     Full active and passive ROM   Pain laterally with flexion, rotation    Strength:      flexion        abduction        adduction   No change in pain    Tender:      greater trochanter    Special Tests:      lateral pain with FADIR       Lateral pain with log roll      RADIOLOGY:  Final results and radiologist's interpretation, available in the [facility] health record.  Images were reviewed with the patient in the office today.  My personal interpretation of the performed imaging: no acute bony abnormality noted. Hip joint spaces appear preserved.      XR Pelvis and Hip Right 1 View    Narrative    EXAM: XR PELVIS AND HIP RIGHT 1 VIEW  LOCATION: [facility] [place]  DATE: 1/13/2025    INDICATION: Suspect GT issues of posterolateral hip, rule out arthritic dalia changes  COMPARISON: None.      Impression    IMPRESSION: Normal joint spaces and alignment. No fracture.             Large Joint Injection/Arthocentesis: bilateral greater trochanteric bursa    Date/Time: 1/13/2025 10:20 AM    Performed by: Cristian Drew DO  Authorized by: Cristian Drew DO    Indications:  Pain  Needle Size:  25 G  Guidance: landmark guided    Approach:  Lateral  Location:  Hip  Laterality:  Bilateral      Site:  Bilateral greater trochanteric bursa  Medications (Right):  40 mg triamcinolone 40 MG/ML; 2 mL lidocaine 1 %  Medications (Left):  40 mg triamcinolone 40 MG/ML; 2 mL lidocaine 1 %  Outcome:  Tolerated well, no immediate complications  Procedure discussed: discussed risks, benefits, and alternatives    Consent Given by:  Patient  Timeout: timeout called immediately prior to procedure    Prep: patient was prepped and draped in usual sterile fashion

## 2025-01-13 ENCOUNTER — OFFICE VISIT (OUTPATIENT)
Dept: ORTHOPEDICS | Facility: CLINIC | Age: 35
End: 2025-01-13
Payer: COMMERCIAL

## 2025-01-13 ENCOUNTER — ANCILLARY PROCEDURE (OUTPATIENT)
Dept: GENERAL RADIOLOGY | Facility: CLINIC | Age: 35
End: 2025-01-13
Attending: PEDIATRICS
Payer: COMMERCIAL

## 2025-01-13 DIAGNOSIS — M25.552 GREATER TROCHANTERIC PAIN SYNDROME OF BOTH LOWER EXTREMITIES: Primary | ICD-10-CM

## 2025-01-13 DIAGNOSIS — M25.551 GREATER TROCHANTERIC PAIN SYNDROME OF BOTH LOWER EXTREMITIES: Primary | ICD-10-CM

## 2025-01-13 DIAGNOSIS — M25.551 CHRONIC RIGHT HIP PAIN: ICD-10-CM

## 2025-01-13 DIAGNOSIS — G89.29 CHRONIC RIGHT HIP PAIN: ICD-10-CM

## 2025-01-13 PROCEDURE — 99214 OFFICE O/P EST MOD 30 MIN: CPT | Mod: 25 | Performed by: PEDIATRICS

## 2025-01-13 PROCEDURE — 20610 DRAIN/INJ JOINT/BURSA W/O US: CPT | Mod: 50 | Performed by: PEDIATRICS

## 2025-01-13 PROCEDURE — 73502 X-RAY EXAM HIP UNI 2-3 VIEWS: CPT | Mod: TC | Performed by: RADIOLOGY

## 2025-01-13 RX ADMIN — LIDOCAINE HYDROCHLORIDE 2 ML: 10 INJECTION, SOLUTION INFILTRATION; PERINEURAL at 10:20

## 2025-01-13 RX ADMIN — TRIAMCINOLONE ACETONIDE 40 MG: 40 INJECTION, SUSPENSION INTRA-ARTICULAR; INTRAMUSCULAR at 10:20

## 2025-01-13 NOTE — LETTER
1/13/2025      Karla Gordon  [address] [facility] [address]      Dear Colleague,    Thank you for referring your patient, Karla Gordon, to the [facility]. Please see a copy of my visit note below.    ASSESSMENT & PLAN    Emily was seen today for pain.    Diagnoses and all orders for this visit:    Greater trochanteric pain syndrome of both lower extremities  -     Large Joint Injection/Arthocentesis: bilateral greater trochanteric bursa  -     Physical Therapy  Referral; Future    Chronic right hip pain  -     XR Pelvis and Hip Right 1 View; Future  -     Large Joint Injection/Arthocentesis: bilateral greater trochanteric bursa  -     Physical Therapy  Referral; Future      Chronic issue, worsening with time.  Discussed PT, injection to start. Done today.  See below.  Questions answered. Discussed signs and symptoms that may indicate more serious issues; the patient was instructed to seek appropriate care if noted. mEily indicates understanding of these issues and agrees with the plan.        See Patient Instructions  Patient Instructions   Bilateral lateral hip pain consistent with lateral source, discussed gluteal tendon involvement, potential for bursitis. Does not appear to be hip joint source, which is good.  Discussed considerations around imaging, physical therapy, steroid injection to start.  Following discussion, physical therapy referral placed.  Bilateral lateral hip steroid injections done today.  Monitor at least 1 month with physical therapy, and if improving, then follow-up is as needed.    If you have any further questions for your physician or physician s care team you can contact them thru [employer] or by calling [phone].          [facility] Injection Discharge Instructions    You may shower, however avoid swimming, tub baths or hot tubs for 24 hours following your procedure  You may have a mild to moderate  increase in pain for several days following the injection.  It may take up to 14 days for the steroid medication to start working although you may feel the effect as early as a few days after the procedure.  You may use ice packs for 10-15 minutes, 3 to 4 times a day at the injection site for comfort  You may use anti-inflammatory medications (such as Ibuprofen or Aleve or Advil) or Tylenol for pain control if necessary  If you were fasting, you may resume your normal diet and medications after the procedure  If you have diabetes, check your blood sugar more frequently than usual as your blood sugar may be higher than normal for 10-14 days following a steroid injection. Contact your doctor who manages your diabetes if your blood sugar is higher than usual    If you experience any of the following, [place] [facility] [place] [phone]  -Fever over 100 degree F  -Swelling, bleeding, redness, drainage, warmth at the injection site  - New or worsening pain     [facility]    -----  Chief Complaint   Patient presents with     Right Hip - Pain       SUBJECTIVE  Karla Hailey Gordon is a/an 34 year old female who is seen as a self referral for evaluation of right hip.     The patient is seen by themselves.    Onset: 1+ years(s) ago. Reports insidious onset without acute precipitating event. Right leg dominant  Location of Pain: right hip, posterior/lateral right hip and tailbone, does note that left is similar with location, not as bad  Worsened by: Sleeping on the side, some days worse than others  Better with:   Treatments tried: Home stretching, icyhot, tylenol/ibuprofen  Associated symptoms: clicking of the lower back    Orthopedic/Surgical history: NO  Social History/Occupation:  and     **  Above information per rooming staff.  Additional history:  Pain lateral hip, R > L. Acetaminophen and ibuprofen generally helpful.  Generally constant  pain.  No clicking/popping in hip area, but can get in low back near midline. No pain associated.      REVIEW OF SYSTEMS:  Review of Systems    OBJECTIVE:           Low back exam:    ROM: flexion hands to ankles, no change in pain    Straight leg raise negative        Bilateral hip exam    Inspection:      no edema or ecchymosis in hip area    ROM:     Full active and passive ROM   Pain laterally with flexion, rotation    Strength:      flexion        abduction        adduction   No change in pain    Tender:      greater trochanter    Special Tests:      lateral pain with FADIR       Lateral pain with log roll      RADIOLOGY:  Final results and radiologist's interpretation, available in the [facility] health record.  Images were reviewed with the patient in the office today.  My personal interpretation of the performed imaging: no acute bony abnormality noted. Hip joint spaces appear preserved.      XR Pelvis and Hip Right 1 View    Narrative    EXAM: XR PELVIS AND HIP RIGHT 1 VIEW  LOCATION: [facility] [place]  DATE: 1/13/2025    INDICATION: Suspect GT issues of posterolateral hip, rule out arthritic dalia changes  COMPARISON: None.      Impression    IMPRESSION: Normal joint spaces and alignment. No fracture.             Large Joint Injection/Arthocentesis: bilateral greater trochanteric bursa    Date/Time: 1/13/2025 10:20 AM    Performed by: Cristian Drew DO  Authorized by: Cristian Drew DO    Indications:  Pain  Needle Size:  25 G  Guidance: landmark guided    Approach:  Lateral  Location:  Hip  Laterality:  Bilateral      Site:  Bilateral greater trochanteric bursa  Medications (Right):  40 mg triamcinolone 40 MG/ML; 2 mL lidocaine 1 %  Medications (Left):  40 mg triamcinolone 40 MG/ML; 2 mL lidocaine 1 %  Outcome:  Tolerated well, no immediate complications  Procedure discussed: discussed risks, benefits, and alternatives    Consent Given by:  Patient  Timeout: timeout  called immediately prior to procedure    Prep: patient was prepped and draped in usual sterile fashion              Again, thank you for allowing me to participate in the care of your patient.        Sincerely,        Cristian Drew, DO    Electronically signed

## 2025-01-13 NOTE — PATIENT INSTRUCTIONS
Bilateral lateral hip pain consistent with lateral source, discussed gluteal tendon involvement, potential for bursitis. Does not appear to be hip joint source, which is good.  Discussed considerations around imaging, physical therapy, steroid injection to start.  Following discussion, physical therapy referral placed.  Bilateral lateral hip steroid injections done today.  Monitor at least 1 month with physical therapy, and if improving, then follow-up is as needed.    If you have any further questions for your physician or physician s care team you can contact them thru [facility] or by calling [phone].          [facility] Injection Discharge Instructions    You may shower, however avoid swimming, tub baths or hot tubs for 24 hours following your procedure  You may have a mild to moderate increase in pain for several days following the injection.  It may take up to 14 days for the steroid medication to start working although you may feel the effect as early as a few days after the procedure.  You may use ice packs for 10-15 minutes, 3 to 4 times a day at the injection site for comfort  You may use anti-inflammatory medications (such as Ibuprofen or Aleve or Advil) or Tylenol for pain control if necessary  If you were fasting, you may resume your normal diet and medications after the procedure  If you have diabetes, check your blood sugar more frequently than usual as your blood sugar may be higher than normal for 10-14 days following a steroid injection. Contact your doctor who manages your diabetes if your blood sugar is higher than usual    If you experience any of the following, call [facility] @ [phone]  -Fever over 100 degree F  -Swelling, bleeding, redness, drainage, warmth at the injection site  - New or worsening pain

## 2025-01-14 RX ORDER — TRIAMCINOLONE ACETONIDE 40 MG/ML
40 INJECTION, SUSPENSION INTRA-ARTICULAR; INTRAMUSCULAR
Status: COMPLETED | OUTPATIENT
Start: 2025-01-13 | End: 2025-01-13

## 2025-01-14 RX ORDER — LIDOCAINE HYDROCHLORIDE 10 MG/ML
2 INJECTION, SOLUTION INFILTRATION; PERINEURAL
Status: COMPLETED | OUTPATIENT
Start: 2025-01-13 | End: 2025-01-13

## 2025-01-23 ENCOUNTER — OFFICE VISIT (OUTPATIENT)
Dept: FAMILY MEDICINE | Facility: CLINIC | Age: 35
End: 2025-01-23
Payer: COMMERCIAL

## 2025-01-23 VITALS
HEIGHT: 67 IN | WEIGHT: 220.6 LBS | SYSTOLIC BLOOD PRESSURE: 111 MMHG | RESPIRATION RATE: 12 BRPM | BODY MASS INDEX: 34.62 KG/M2 | OXYGEN SATURATION: 98 % | TEMPERATURE: 98.3 F | DIASTOLIC BLOOD PRESSURE: 75 MMHG | HEART RATE: 82 BPM

## 2025-01-23 DIAGNOSIS — F41.1 GENERALIZED ANXIETY DISORDER: ICD-10-CM

## 2025-01-23 DIAGNOSIS — E66.812 CLASS 2 SEVERE OBESITY WITH SERIOUS COMORBIDITY AND BODY MASS INDEX (BMI) OF 35.0 TO 35.9 IN ADULT, UNSPECIFIED OBESITY TYPE (H): ICD-10-CM

## 2025-01-23 DIAGNOSIS — F17.200 TOBACCO USE DISORDER: ICD-10-CM

## 2025-01-23 DIAGNOSIS — Z13.21 ENCOUNTER FOR VITAMIN DEFICIENCY SCREENING: ICD-10-CM

## 2025-01-23 DIAGNOSIS — G43.009 MIGRAINE WITHOUT AURA AND WITHOUT STATUS MIGRAINOSUS, NOT INTRACTABLE: ICD-10-CM

## 2025-01-23 DIAGNOSIS — E66.01 CLASS 2 SEVERE OBESITY WITH SERIOUS COMORBIDITY AND BODY MASS INDEX (BMI) OF 35.0 TO 35.9 IN ADULT, UNSPECIFIED OBESITY TYPE (H): ICD-10-CM

## 2025-01-23 DIAGNOSIS — F10.91 ALCOHOL USE DISORDER IN REMISSION: ICD-10-CM

## 2025-01-23 DIAGNOSIS — R73.03 PREDIABETES: ICD-10-CM

## 2025-01-23 DIAGNOSIS — Z86.32 HISTORY OF GESTATIONAL DIABETES: ICD-10-CM

## 2025-01-23 DIAGNOSIS — D52.0 DIETARY FOLATE DEFICIENCY ANEMIA: ICD-10-CM

## 2025-01-23 DIAGNOSIS — Z23 NEED FOR VACCINATION: ICD-10-CM

## 2025-01-23 DIAGNOSIS — Z00.00 ROUTINE GENERAL MEDICAL EXAMINATION AT A HEALTH CARE FACILITY: Primary | ICD-10-CM

## 2025-01-23 DIAGNOSIS — Z13.220 LIPID SCREENING: ICD-10-CM

## 2025-01-23 PROBLEM — O24.419 GESTATIONAL DIABETES: Status: RESOLVED | Noted: 2019-12-29 | Resolved: 2025-01-23

## 2025-01-23 PROBLEM — K70.11 ALCOHOLIC HEPATITIS WITH ASCITES (H): Status: RESOLVED | Noted: 2019-01-13 | Resolved: 2025-01-23

## 2025-01-23 PROBLEM — M19.019 LOCALIZED, PRIMARY OSTEOARTHRITIS OF SHOULDER REGION: Status: ACTIVE | Noted: 2025-01-23

## 2025-01-23 PROBLEM — R20.0 NUMBNESS AND TINGLING OF FOOT: Status: RESOLVED | Noted: 2018-03-21 | Resolved: 2025-01-23

## 2025-01-23 PROBLEM — F10.20 ALCOHOL USE DISORDER, MODERATE, DEPENDENCE (H): Status: RESOLVED | Noted: 2018-11-19 | Resolved: 2025-01-23

## 2025-01-23 PROBLEM — F10.21 HISTORY OF ALCOHOL DEPENDENCE (H): Status: RESOLVED | Noted: 2022-05-02 | Resolved: 2025-01-23

## 2025-01-23 PROBLEM — R20.2 NUMBNESS AND TINGLING OF FOOT: Status: RESOLVED | Noted: 2018-03-21 | Resolved: 2025-01-23

## 2025-01-23 LAB
ALBUMIN SERPL BCG-MCNC: 4.1 G/DL (ref 3.5–5.2)
ALP SERPL-CCNC: 98 U/L (ref 40–150)
ALT SERPL W P-5'-P-CCNC: 21 U/L (ref 0–50)
ANION GAP SERPL CALCULATED.3IONS-SCNC: 11 MMOL/L (ref 7–15)
AST SERPL W P-5'-P-CCNC: 17 U/L (ref 0–45)
BILIRUB SERPL-MCNC: 0.2 MG/DL
BUN SERPL-MCNC: 10.7 MG/DL (ref 6–20)
CALCIUM SERPL-MCNC: 9.4 MG/DL (ref 8.8–10.4)
CHLORIDE SERPL-SCNC: 104 MMOL/L (ref 98–107)
CHOLEST SERPL-MCNC: 144 MG/DL
CREAT SERPL-MCNC: 0.61 MG/DL (ref 0.51–0.95)
EGFRCR SERPLBLD CKD-EPI 2021: >90 ML/MIN/1.73M2
ERYTHROCYTE [DISTWIDTH] IN BLOOD BY AUTOMATED COUNT: 13.1 % (ref 10–15)
EST. AVERAGE GLUCOSE BLD GHB EST-MCNC: 114 MG/DL
FASTING STATUS PATIENT QL REPORTED: NO
FASTING STATUS PATIENT QL REPORTED: NO
GLUCOSE SERPL-MCNC: 92 MG/DL (ref 70–99)
HBA1C MFR BLD: 5.6 % (ref 0–5.6)
HCO3 SERPL-SCNC: 25 MMOL/L (ref 22–29)
HCT VFR BLD AUTO: 42.3 % (ref 35–47)
HDLC SERPL-MCNC: 50 MG/DL
HGB BLD-MCNC: 13.8 G/DL (ref 11.7–15.7)
LDLC SERPL CALC-MCNC: 80 MG/DL
MCH RBC QN AUTO: 29.2 PG (ref 26.5–33)
MCHC RBC AUTO-ENTMCNC: 32.6 G/DL (ref 31.5–36.5)
MCV RBC AUTO: 90 FL (ref 78–100)
NONHDLC SERPL-MCNC: 94 MG/DL
PLATELET # BLD AUTO: 312 10E3/UL (ref 150–450)
POTASSIUM SERPL-SCNC: 4.9 MMOL/L (ref 3.4–5.3)
PROT SERPL-MCNC: 7.5 G/DL (ref 6.4–8.3)
RBC # BLD AUTO: 4.72 10E6/UL (ref 3.8–5.2)
SODIUM SERPL-SCNC: 140 MMOL/L (ref 135–145)
TRIGL SERPL-MCNC: 70 MG/DL
VIT D+METAB SERPL-MCNC: 41 NG/ML (ref 20–50)
WBC # BLD AUTO: 14.2 10E3/UL (ref 4–11)

## 2025-01-23 RX ORDER — SUMATRIPTAN SUCCINATE 25 MG/1
25 TABLET ORAL
Qty: 10 TABLET | Refills: 5 | Status: SHIPPED | OUTPATIENT
Start: 2025-01-23

## 2025-01-23 RX ORDER — HYDROXYZINE HYDROCHLORIDE 25 MG/1
TABLET, FILM COATED ORAL
Qty: 90 TABLET | Refills: 2 | Status: SHIPPED | OUTPATIENT
Start: 2025-01-23

## 2025-01-23 RX ORDER — FERROUS SULFATE 325(65) MG
325 TABLET ORAL
COMMUNITY

## 2025-01-23 RX ORDER — CALCIUM CARBONATE/VITAMIN D3 500-10/5ML
LIQUID (ML) ORAL
COMMUNITY

## 2025-01-23 SDOH — HEALTH STABILITY: PHYSICAL HEALTH: ON AVERAGE, HOW MANY DAYS PER WEEK DO YOU ENGAGE IN MODERATE TO STRENUOUS EXERCISE (LIKE A BRISK WALK)?: 5 DAYS

## 2025-01-23 SDOH — HEALTH STABILITY: PHYSICAL HEALTH: ON AVERAGE, HOW MANY MINUTES DO YOU ENGAGE IN EXERCISE AT THIS LEVEL?: 40 MIN

## 2025-01-23 ASSESSMENT — SOCIAL DETERMINANTS OF HEALTH (SDOH): HOW OFTEN DO YOU GET TOGETHER WITH FRIENDS OR RELATIVES?: ONCE A WEEK

## 2025-01-23 ASSESSMENT — PAIN SCALES - GENERAL: PAINLEVEL_OUTOF10: NO PAIN (0)

## 2025-01-23 NOTE — PATIENT INSTRUCTIONS
"  Problem: Infant Inpatient Plan of Care  Goal: Patient-Specific Goal (Individualized)  Flowsheets (Taken 2023 7685)  Patient/Family-Specific Goals (Include Timeframe): " go home with healthy baby"     " Patient Education   Preventive Care Advice   This is general advice given by our system to help you stay healthy. However, your care team may have specific advice just for you. Please talk to your care team about your preventive care needs.  Nutrition  Eat 5 or more servings of fruits and vegetables each day.  Try wheat bread, brown rice and whole grain pasta (instead of white bread, rice, and pasta).  Get enough calcium and vitamin D. Check the label on foods and aim for 100% of the RDA (recommended daily allowance).  Lifestyle  Exercise at least 150 minutes each week  (30 minutes a day, 5 days a week).  Do muscle strengthening activities 2 days a week. These help control your weight and prevent disease.  No smoking.  Wear sunscreen to prevent skin cancer.  Have a dental exam and cleaning every 6 months.  Yearly exams  See your health care team every year to talk about:  Any changes in your health.  Any medicines your care team has prescribed.  Preventive care, family planning, and ways to prevent chronic diseases.  Shots (vaccines)   HPV shots (up to age 26), if you've never had them before.  Hepatitis B shots (up to age 59), if you've never had them before.  COVID-19 shot: Get this shot when it's due.  Flu shot: Get a flu shot every year.  Tetanus shot: Get a tetanus shot every 10 years.  Pneumococcal, hepatitis A, and RSV shots: Ask your care team if you need these based on your risk.  Shingles shot (for age 50 and up)  General health tests  Diabetes screening:  Starting at age 35, Get screened for diabetes at least every 3 years.  If you are younger than age 35, ask your care team if you should be screened for diabetes.  Cholesterol test: At age 39, start having a cholesterol test every 5 years, or more often if advised.  Bone density scan (DEXA): At age 50, ask your care team if you should have this scan for osteoporosis (brittle bones).  Hepatitis C: Get tested at least once in your life.  STIs (sexually  transmitted infections)  Before age 24: Ask your care team if you should be screened for STIs.  After age 24: Get screened for STIs if you're at risk. You are at risk for STIs (including HIV) if:  You are sexually active with more than one person.  You don't use condoms every time.  You or a partner was diagnosed with a sexually transmitted infection.  If you are at risk for HIV, ask about PrEP medicine to prevent HIV.  Get tested for HIV at least once in your life, whether you are at risk for HIV or not.  Cancer screening tests  Cervical cancer screening: If you have a cervix, begin getting regular cervical cancer screening tests starting at age 21.  Breast cancer scan (mammogram): If you've ever had breasts, begin having regular mammograms starting at age 40. This is a scan to check for breast cancer.  Colon cancer screening: It is important to start screening for colon cancer at age 45.  Have a colonoscopy test every 10 years (or more often if you're at risk) Or, ask your provider about stool tests like a FIT test every year or Cologuard test every 3 years.  To learn more about your testing options, visit:   .  For help making a decision, visit:   https://bit.ly/sb59890.  Prostate cancer screening test: If you have a prostate, ask your care team if a prostate cancer screening test (PSA) at age 55 is right for you.  Lung cancer screening: If you are a current or former smoker ages 50 to 80, ask your care team if ongoing lung cancer screenings are right for you.  For informational purposes only. Not to replace the advice of your health care provider. Copyright   2023 [facility]. All rights reserved. Clinically reviewed by the [facility] Transitions Program. "[employer]" 022404 - REV 01/24.  Learning About Stress  What is stress?     Stress is your body's response to a hard situation. Your body can have a physical, emotional, or mental response. Stress is a fact of life for most people, and it  affects everyone differently. What causes stress for you may not be stressful for someone else.  A lot of things can cause stress. You may feel stress when you go on a job interview, take a test, or run a race. This kind of short-term stress is normal and even useful. It can help you if you need to work hard or react quickly. For example, stress can help you finish an important job on time.  Long-term stress is caused by ongoing stressful situations or events. Examples of long-term stress include long-term health problems, ongoing problems at work, or conflicts in your family. Long-term stress can harm your health.  How does stress affect your health?  When you are stressed, your body responds as though you are in danger. It makes hormones that speed up your heart, make you breathe faster, and give you a burst of energy. This is called the fight-or-flight stress response. If the stress is over quickly, your body goes back to normal and no harm is done.  But if stress happens too often or lasts too long, it can have bad effects. Long-term stress can make you more likely to get sick, and it can make symptoms of some diseases worse. If you tense up when you are stressed, you may develop neck, shoulder, or low back pain. Stress is linked to high blood pressure and heart disease.  Stress also harms your emotional health. It can make you mchugh, tense, or depressed. Your relationships may suffer, and you may not do well at work or school.  What can you do to manage stress?  You can try these things to help manage stress:   Do something active. Exercise or activity can help reduce stress. Walking is a great way to get started. Even everyday activities such as housecleaning or yard work can help.  Try yoga or willis chi. These techniques combine exercise and meditation. You may need some training at first to learn them.  Do something you enjoy. For example, listen to music or go to a movie. Practice your hobby or do volunteer  "work.  Meditate. This can help you relax, because you are not worrying about what happened before or what may happen in the future.  Do guided imagery. Imagine yourself in any setting that helps you feel calm. You can use online videos, books, or a teacher to guide you.  Do breathing exercises. For example:  From a standing position, bend forward from the waist with your knees slightly bent. Let your arms dangle close to the floor.  Breathe in slowly and deeply as you return to a standing position. Roll up slowly and lift your head last.  Hold your breath for just a few seconds in the standing position.  Breathe out slowly and bend forward from the waist.  Let your feelings out. Talk, laugh, cry, and express anger when you need to. Talking with supportive friends or family, a counselor, or a daylin leader about your feelings is a healthy way to relieve stress. Avoid discussing your feelings with people who make you feel worse.  Write. It may help to write about things that are bothering you. This helps you find out how much stress you feel and what is causing it. When you know this, you can find better ways to cope.  What can you do to prevent stress?  You might try some of these things to help prevent stress:  Manage your time. This helps you find time to do the things you want and need to do.  Get enough sleep. Your body recovers from the stresses of the day while you are sleeping.  Get support. Your family, friends, and community can make a difference in how you experience stress.  Limit your news feed. Avoid or limit time on social media or news that may make you feel stressed.  Do something active. Exercise or activity can help reduce stress. Walking is a great way to get started.  Where can you learn more?  Go to https://www.[employer].net/patiented  Enter N032 in the search box to learn more about \"Learning About Stress.\"  Current as of: October 24, 2023  Content Version: 14.3    2024 [employer]. "   Care instructions adapted under license by your healthcare professional. If you have questions about a medical condition or this instruction, always ask your healthcare professional. [employer] disclaims any warranty or liability for your use of this information.

## 2025-01-23 NOTE — NURSING NOTE
Prior to immunization administration, verified patients identity using patient s name and date of birth. Please see Immunization Activity for additional information.     Screening Questionnaire for Adult Immunization    Are you sick today?   No   Do you have allergies to medications, food, a vaccine component or latex?   No   Have you ever had a serious reaction after receiving a vaccination?   No   Do you have a long-term health problem with heart, lung, kidney, or metabolic disease (e.g., diabetes), asthma, a blood disorder, no spleen, complement component deficiency, a cochlear implant, or a spinal fluid leak?  Are you on long-term aspirin therapy?   No   Do you have cancer, leukemia, HIV/AIDS, or any other immune system problem?   No   Do you have a parent, brother, or sister with an immune system problem?   No   In the past 3 months, have you taken medications that affect  your immune system, such as prednisone, other steroids, or anticancer drugs; drugs for the treatment of rheumatoid arthritis, Crohn s disease, or psoriasis; or have you had radiation treatments?   No   Have you had a seizure, or a brain or other nervous system problem?   No   During the past year, have you received a transfusion of blood or blood    products, or been given immune (gamma) globulin or antiviral drug?   No   For women: Are you pregnant or is there a chance you could become       pregnant during the next month?   No   Have you received any vaccinations in the past 4 weeks?   No     Immunization questionnaire answers were all negative.      Patient instructed to remain in clinic for 15 minutes afterwards, and to report any adverse reactions.     Screening performed by Emani Weldon MA on 1/23/2025 at 7:56 AM.

## 2025-01-23 NOTE — PROGRESS NOTES
Preventive Care Visit  [facility]  Mary Romero DNP, Family Medicine  Jan 23, 2025      Assessment & Plan     Routine general medical examination at a health care facility  Routine physical. New to this provider.     Generalized anxiety disorder  Stable on zoloft and PRN hydroxyzine. Refills sent. Labs done.     - COMPREHENSIVE METABOLIC PANEL; Future  - sertraline (ZOLOFT) 50 MG tablet; Take 1 tablet (50 mg) by mouth daily.  - hydrOXYzine HCl (ATARAX) 25 MG tablet; TAKE 1 TABLET(25 MG) BY MOUTH TWICE DAILY AS NEEDED FOR ANXIETY OR SLEEP. MAY ALSO TAKE AT BEDTIME AS NEEDED FOR SLEEP  - COMPREHENSIVE METABOLIC PANEL    Migraine without aura and without status migrainosus, not intractable  Stable with PRN imitrex. She recently started magnesium for rayray horse's. Advised she continue it to see if it helps prevent migraines.     - COMPREHENSIVE METABOLIC PANEL; Future  - SUMAtriptan (IMITREX) 25 MG tablet; Take 1 tablet (25 mg) by mouth at onset of headache for migraine. May repeat in 2 hours. Max 8 tablets/24 hours.  - COMPREHENSIVE METABOLIC PANEL    Prediabetes  Rechecking labs.     - Hemoglobin A1c; Future  - Hemoglobin A1c    History of gestational diabetes  See above.     - COMPREHENSIVE METABOLIC PANEL; Future  - COMPREHENSIVE METABOLIC PANEL    Alcohol use disorder in remission  In remission.     Dietary folate deficiency anemia  Rechecking labs. She does take an iron supplement daily.     - CBC with Platelets; Future  - CBC with Platelets    Class 2 severe obesity with serious comorbidity and body mass index (BMI) of 35.0 to 35.9 in adult, unspecified obesity type (H)  Diet and exercise recommendations.     Tobacco use disorder  1/2 ppd. Not interested in quitting at this time. Advised follow-up when she is.     Encounter for vitamin deficiency screening  Screening.     - Vitamin D Deficiency; Future  - Vitamin D Deficiency    Lipid screening  Screening. Not fasting.     -  "Lipid panel reflex to direct LDL Non-fasting; Future  - Lipid panel reflex to direct LDL Non-fasting    Need for vaccination  Given.    - Pneumococcal 20 Valent Conjugate (Prevnar 20)    Patient has been advised of split billing requirements and indicates understanding: Yes, reviewed by MA        Nicotine/Tobacco Cessation  She reports that she has been smoking cigarettes. She has a 3.5 pack-year smoking history. She has never been exposed to tobacco smoke. She has never used smokeless tobacco.  Nicotine/Tobacco Cessation Plan  Information offered: Patient not interested at this time      BMI  Estimated body mass index is 35.07 kg/m  as calculated from the following:    Height as of this encounter: 1.689 m (5' 6.5\").    Weight as of this encounter: 100.1 kg (220 lb 9.6 oz).   Weight management plan: Discussed healthy diet and exercise guidelines    Counseling  Appropriate preventive services were addressed with this patient via screening, questionnaire, or discussion as appropriate for fall prevention, nutrition, physical activity, Tobacco-use cessation, social engagement, weight loss and cognition.  Checklist reviewing preventive services available has been given to the patient.  Reviewed patient's diet, addressing concerns and/or questions.   The patient was instructed to see the dentist every 6 months.   She is at risk for psychosocial distress and has been provided with information to reduce risk.       See Patient Instructions    Denise Diaz is a 34 year old, presenting for the following:  Physical, Headache (Refill medication ), and Depression (Refill med )          1/23/2025     7:47 AM   Additional Questions   Roomed by Emani   Accompanied by shawn         1/23/2025     7:47 AM   Patient Reported Additional Medications   Patient reports taking the following new medications none     HPI       Depression and Anxiety   How are you doing with your depression since your last visit? No change  How are you " doing with your anxiety since your last visit?  No change  Are you having other symptoms that might be associated with depression or anxiety? No  Have you had a significant life event? No   Do you have any concerns with your use of alcohol or other drugs? No    Social History     Tobacco Use    Smoking status: Every Day     Current packs/day: 0.50     Average packs/day: 0.5 packs/day for 7.0 years (3.5 ttl pk-yrs)     Types: Cigarettes     Passive exposure: Never    Smokeless tobacco: Never   Vaping Use    Vaping status: Never Used   Substance Use Topics    Alcohol use: Not Currently    Drug use: No         5/2/2022     7:40 AM 9/1/2022    10:26 AM 1/3/2024     4:57 PM   PHQ   PHQ-9 Total Score 2 7 2   Q9: Thoughts of better off dead/self-harm past 2 weeks Not at all  Not at all Not at all       Proxy-reported         5/2/2022     7:40 AM 9/1/2022    10:26 AM 1/3/2024     4:57 PM   APOLINAR-7 SCORE   Total Score 3 (minimal anxiety) 5 (mild anxiety)    Total Score 3 5 4         1/3/2024     4:57 PM   Last PHQ-9   1.  Little interest or pleasure in doing things 0   2.  Feeling down, depressed, or hopeless 0   3.  Trouble falling or staying asleep, or sleeping too much 1   4.  Feeling tired or having little energy 1   5.  Poor appetite or overeating 0   6.  Feeling bad about yourself 0   7.  Trouble concentrating 0   8.  Moving slowly or restless 0   Q9: Thoughts of better off dead/self-harm past 2 weeks 0   PHQ-9 Total Score 2   Difficulty at work, home, or with people Not difficult at all         1/3/2024     4:57 PM   APOLINAR-7    1. Feeling nervous, anxious, or on edge 1   2. Not being able to stop or control worrying 1   3. Worrying too much about different things 1   4. Trouble relaxing 1   5. Being so restless that it is hard to sit still 0   6. Becoming easily annoyed or irritable 0   7. Feeling afraid, as if something awful might happen 0   APOLINAR-7 Total Score 4   If you checked any problems, how difficult have they made  it for you to do your work, take care of things at home, or get along with other people? Not difficult at all         5/2/2022     7:40 AM 9/1/2022    10:26 AM 1/3/2024     4:57 PM   PHQ   PHQ-9 Total Score 2 7 2   Q9: Thoughts of better off dead/self-harm past 2 weeks Not at all  Not at all Not at all       Proxy-reported          5/2/2022     7:40 AM 9/1/2022    10:26 AM 1/3/2024     4:57 PM   APOLINAR-7 SCORE   Total Score 3 (minimal anxiety) 5 (mild anxiety)    Total Score 3 5 4          Suicide Assessment Five-step Evaluation and Treatment (SAFE-T)    Migraine   Since your last clinic visit, how have your headaches changed?  No change  How often are you getting headaches or migraines? A few times per month   Are you able to do normal daily activities when you have a migraine? Yes  Are you taking rescue/relief medications? (Select all that apply) ibuprofen (Advil, Motrin), Tylenol, and sumatriptan (Imitrex)  How helpful is your rescue/relief medication?  I get total relief  Are you taking any medications to prevent migraines? (Select all that apply)  No  In the past 4 weeks, how often have you gone to urgent care or the emergency room because of your headaches?  0    Additional provider notes: Patient presents for the following:     Annual prevent.     Depression: takes zoloft    Migraines: takes imitrex PRN. Gets migraines a couple times a month, tolerating much better on imitrex. Stress tends to be her trigger. She just started taking Magnesium 1-1.5 weeks ago for rayray horses.     Tobacco use: 1/2 ppd. Not interested in quitting at this time.       Health Care Directive  Patient does not have a Health Care Directive: not discussed      1/23/2025   General Health   How would you rate your overall physical health? (!) FAIR   Feel stress (tense, anxious, or unable to sleep) To some extent   (!) STRESS CONCERN      1/23/2025   Nutrition   Three or more servings of calcium each day? (!) NO   Diet: Regular (no  restrictions)   How many servings of fruit and vegetables per day? (!) 2-3   How many sweetened beverages each day? 0-1         1/23/2025   Exercise   Days per week of moderate/strenous exercise 5 days   Average minutes spent exercising at this level 40 min         1/23/2025   Social Factors   Frequency of gathering with friends or relatives Once a week   Worry food won't last until get money to buy more No   Food not last or not have enough money for food? No   Do you have housing? (Housing is defined as stable permanent housing and does not include staying ouside in a car, in a tent, in an abandoned building, in an overnight shelter, or couch-surfing.) Yes   Are you worried about losing your housing? No   Lack of transportation? No   Unable to get utilities (heat,electricity)? No         1/23/2025   Dental   Dentist two times every year? (!) NO         1/23/2025   TB Screening   Were you born outside of the [place]? No         Today's PHQ-2 Score:       1/23/2025     6:27 AM   PHQ-2 ( 1999 [employer])   Q1: Little interest or pleasure in doing things 1   Q2: Feeling down, depressed or hopeless 0   PHQ-2 Score 1    Q1: Little interest or pleasure in doing things Several days   Q2: Feeling down, depressed or hopeless Not at all   PHQ-2 Score 1       Patient-reported           1/23/2025   Substance Use   Alcohol more than 3/day or more than 7/wk Not Applicable   Do you use any other substances recreationally? No     Social History     Tobacco Use    Smoking status: Every Day     Current packs/day: 0.50     Average packs/day: 0.5 packs/day for 7.0 years (3.5 ttl pk-yrs)     Types: Cigarettes     Passive exposure: Never    Smokeless tobacco: Never   Vaping Use    Vaping status: Never Used   Substance Use Topics    Alcohol use: Not Currently    Drug use: No          Mammogram Screening - Patient under 40 years of age: Routine Mammogram Screening not recommended.         1/23/2025   STI Screening   New sexual partner(s) since  last STI/HIV test? No     History of abnormal Pap smear: No - age 30- 62 PAP with HPV every 5 years recommended        Latest Ref Rng & Units 2022     8:25 AM 2020     2:34 PM 2020     2:25 PM   PAP / HPV   PAP  Negative for Intraepithelial Lesion or Malignancy (NILM)      PAP (Historical)   NIL     HPV 16 DNA Negative Negative   Negative    HPV 18 DNA Negative Negative   Negative    Other HR HPV Negative Negative   Negative            2025   Contraception/Family Planning   Questions about contraception or family planning No        Reviewed and updated as needed this visit by Provider     Meds                Past Medical History:   Diagnosis Date    Alcohol use disorder, mild, abuse 2018    Cervical high risk HPV (human papillomavirus) test positive 2019    See problem list.     Dietary folate deficiency anemia 2018    Fatty liver 3/22/2018    3/21/18:  ALT 76,  (4.2 xULN).   18:  Hepatitis B and C negative  Patient also with thrombocytopenia and mild macrocytic anemia  18:  IMPRESSION:     1. Increased hepatic echogenicity as can be seen in intrinsic parenchymal disease such as steatosis. 2. Liver is enlarged, measuring 21.6 cm. 3. Spleen is borderline enlarged, measuring 1.9 cm    Macrocytic anemia 4/3/2018    Thrombocytopenia 3/22/2018    3/21/18:  Platelets noted at 92.   18:  Platelets 92.  Mild macrocytic anemia.  Peripheral smear pending, consider Hematology referral    Tobacco use disorder 3/21/2018     Past Surgical History:   Procedure Laterality Date    ABDOMINAL PARACENTESIS  2019    US Paracentesis with Imaging at [facility].  Fluid removed: 5910 mL    HC TOOTH EXTRACTION W/FORCEP      IR PARACENTESIS  2019    ORTHOPEDIC SURGERY      ZZC ANESTH,SHOULDER REPLACEMENT Right 2015    right partial shoulder replacement at age 25     OB History    Para Term  AB Living   3 2 2 0 1 2   SAB IAB Ectopic Multiple Live Births   0 1 0  "0 2      # Outcome Date GA Lbr Gentry/2nd Weight Sex Type Anes PTL Lv   3 Term 19 39w5d 02:37 / 00:10 2.892 kg (6 lb 6 oz) F Vag-Spont EPI N KATELYN      Complications: GBS      Name: SIOMARAFEMALE-GERA      Apgar1: 9  Apgar5: 9   2 Term 14    M    KATELYN   1 IAB      IAB         Obstetric Comments               Pitocin augmentation.  Pushed for long time.  7 lb 6 oz                                                       Review of Systems  Constitutional, HEENT, cardiovascular, pulmonary, gi and gu systems are negative, except as otherwise noted.     Objective    Exam  /75 (BP Location: Right arm, Patient Position: Sitting, Cuff Size: Adult Large)   Pulse 82   Temp 98.3  F (36.8  C) (Oral)   Resp 12   Ht 1.689 m (5' 6.5\")   Wt 100.1 kg (220 lb 9.6 oz)   LMP 2024 (Approximate)   SpO2 98%   Breastfeeding No   BMI 35.07 kg/m     Estimated body mass index is 35.07 kg/m  as calculated from the following:    Height as of this encounter: 1.689 m (5' 6.5\").    Weight as of this encounter: 100.1 kg (220 lb 9.6 oz).    Physical Exam  GENERAL: alert and no distress  EYES: Eyes grossly normal to inspection, PERRL and conjunctivae and sclerae normal  HENT: ear canals and TM's normal, nose and mouth without ulcers or lesions  NECK: no adenopathy, no asymmetry, masses, or scars  RESP: lungs clear to auscultation - no rales, rhonchi or wheezes  BREAST: normal without masses, tenderness or nipple discharge and no palpable axillary masses or adenopathy  CV: regular rate and rhythm, normal S1 S2, no S3 or S4, no murmur, click or rub, no peripheral edema  ABDOMEN: soft, nontender, no hepatosplenomegaly, no masses and bowel sounds normal  MS: no gross musculoskeletal defects noted, no edema  SKIN: no suspicious lesions or rashes  NEURO: Normal strength and tone, mentation intact and speech normal  PSYCH: mentation appears normal, affect normal/bright        Signed Electronically by: Mary Romero, " DNP

## 2025-01-28 DIAGNOSIS — D72.829 LEUKOCYTOSIS, UNSPECIFIED TYPE: Primary | ICD-10-CM

## 2025-02-04 ENCOUNTER — LAB (OUTPATIENT)
Dept: LAB | Facility: CLINIC | Age: 35
End: 2025-02-04
Payer: COMMERCIAL

## 2025-02-04 DIAGNOSIS — D72.829 LEUKOCYTOSIS, UNSPECIFIED TYPE: ICD-10-CM

## 2025-02-04 LAB
ERYTHROCYTE [DISTWIDTH] IN BLOOD BY AUTOMATED COUNT: 13 % (ref 10–15)
HCT VFR BLD AUTO: 41.7 % (ref 35–47)
HGB BLD-MCNC: 13.4 G/DL (ref 11.7–15.7)
MCH RBC QN AUTO: 29.1 PG (ref 26.5–33)
MCHC RBC AUTO-ENTMCNC: 32.1 G/DL (ref 31.5–36.5)
MCV RBC AUTO: 91 FL (ref 78–100)
PLATELET # BLD AUTO: 331 10E3/UL (ref 150–450)
RBC # BLD AUTO: 4.61 10E6/UL (ref 3.8–5.2)
WBC # BLD AUTO: 12 10E3/UL (ref 4–11)

## 2025-02-04 PROCEDURE — 85027 COMPLETE CBC AUTOMATED: CPT

## 2025-02-04 PROCEDURE — 36415 COLL VENOUS BLD VENIPUNCTURE: CPT

## 2025-02-06 DIAGNOSIS — D72.829 LEUKOCYTOSIS, UNSPECIFIED TYPE: Primary | ICD-10-CM

## 2025-02-20 NOTE — TELEPHONE ENCOUNTER
RECORDS STATUS - ALL OTHER DIAGNOSIS      RECORDS RECEIVED FROM: [facility] - Internal records   DATE RECEIVED: 2/20

## 2025-03-21 ENCOUNTER — PRE VISIT (OUTPATIENT)
Dept: ONCOLOGY | Facility: CLINIC | Age: 35
End: 2025-03-21
Payer: COMMERCIAL

## 2025-08-22 DIAGNOSIS — F41.1 GENERALIZED ANXIETY DISORDER: ICD-10-CM

## 2025-08-25 RX ORDER — HYDROXYZINE HYDROCHLORIDE 25 MG/1
TABLET, FILM COATED ORAL
Qty: 90 TABLET | Refills: 1 | Status: SHIPPED | OUTPATIENT
Start: 2025-08-25